# Patient Record
Sex: FEMALE | Race: OTHER | HISPANIC OR LATINO | Employment: UNEMPLOYED | URBAN - METROPOLITAN AREA
[De-identification: names, ages, dates, MRNs, and addresses within clinical notes are randomized per-mention and may not be internally consistent; named-entity substitution may affect disease eponyms.]

---

## 2022-11-30 LAB
ABO + RH BLD: NORMAL
HBV SURFACE AG SERPL QL IA: NEGATIVE
HCT VFR BLD AUTO: 37.7 % (ref 36–46)
HGB BLD-MCNC: 11.7 G/DL (ref 12–16)
HIV 1+2 AB+HIV1 P24 AG SERPL QL IA: NEGATIVE
INDIRECT COOMBS: NEGATIVE
MCV RBC AUTO: 4.2 FL (ref 82–108)
N GONORRHOEAE, AMPLIFIED DNA: NEGATIVE
PLATELET # BLD AUTO: 262 K/UL (ref 150–399)
RPR: NEGATIVE
RUBELLA IMMUNE STATUS: NORMAL

## 2022-12-06 ENCOUNTER — TELEPHONE (OUTPATIENT)
Dept: OBSTETRICS AND GYNECOLOGY | Facility: CLINIC | Age: 22
End: 2022-12-06
Payer: MEDICAID

## 2022-12-06 NOTE — TELEPHONE ENCOUNTER
----- Message from Jamshid Gifford NP sent at 12/6/2022  7:49 AM CST -----  20 weeks. Please get pt scheduled with midwife close to address. Thanks.    Jamshid

## 2022-12-13 ENCOUNTER — OFFICE VISIT (OUTPATIENT)
Dept: OBSTETRICS AND GYNECOLOGY | Facility: CLINIC | Age: 22
End: 2022-12-13
Payer: MEDICAID

## 2022-12-13 VITALS
HEIGHT: 68 IN | WEIGHT: 293 LBS | SYSTOLIC BLOOD PRESSURE: 130 MMHG | BODY MASS INDEX: 44.41 KG/M2 | DIASTOLIC BLOOD PRESSURE: 88 MMHG

## 2022-12-13 DIAGNOSIS — O09.30 LATE PRENATAL CARE: ICD-10-CM

## 2022-12-13 DIAGNOSIS — Z32.01 POSITIVE PREGNANCY TEST: Primary | ICD-10-CM

## 2022-12-13 DIAGNOSIS — J45.909 ASTHMA, UNSPECIFIED ASTHMA SEVERITY, UNSPECIFIED WHETHER COMPLICATED, UNSPECIFIED WHETHER PERSISTENT: ICD-10-CM

## 2022-12-13 DIAGNOSIS — Z34.90 PREGNANCY, UNSPECIFIED GESTATIONAL AGE: ICD-10-CM

## 2022-12-13 PROCEDURE — 90471 IMMUNIZATION ADMIN: CPT | Mod: PBBFAC

## 2022-12-13 PROCEDURE — 99999 PR PBB SHADOW E&M-EST. PATIENT-LVL III: ICD-10-PCS | Mod: PBBFAC,,, | Performed by: MIDWIFE

## 2022-12-13 PROCEDURE — 99213 OFFICE O/P EST LOW 20 MIN: CPT | Mod: PBBFAC,TH | Performed by: MIDWIFE

## 2022-12-13 PROCEDURE — 99999 PR PBB SHADOW E&M-EST. PATIENT-LVL III: CPT | Mod: PBBFAC,,, | Performed by: MIDWIFE

## 2022-12-13 PROCEDURE — 3079F PR MOST RECENT DIASTOLIC BLOOD PRESSURE 80-89 MM HG: ICD-10-PCS | Mod: CPTII,,, | Performed by: MIDWIFE

## 2022-12-13 PROCEDURE — 3075F PR MOST RECENT SYSTOLIC BLOOD PRESS GE 130-139MM HG: ICD-10-PCS | Mod: CPTII,,, | Performed by: MIDWIFE

## 2022-12-13 PROCEDURE — 3008F PR BODY MASS INDEX (BMI) DOCUMENTED: ICD-10-PCS | Mod: CPTII,,, | Performed by: MIDWIFE

## 2022-12-13 PROCEDURE — 1159F PR MEDICATION LIST DOCUMENTED IN MEDICAL RECORD: ICD-10-PCS | Mod: CPTII,,, | Performed by: MIDWIFE

## 2022-12-13 PROCEDURE — 3079F DIAST BP 80-89 MM HG: CPT | Mod: CPTII,,, | Performed by: MIDWIFE

## 2022-12-13 PROCEDURE — 1159F MED LIST DOCD IN RCRD: CPT | Mod: CPTII,,, | Performed by: MIDWIFE

## 2022-12-13 PROCEDURE — 3008F BODY MASS INDEX DOCD: CPT | Mod: CPTII,,, | Performed by: MIDWIFE

## 2022-12-13 PROCEDURE — 99203 OFFICE O/P NEW LOW 30 MIN: CPT | Mod: S$PBB,TH,, | Performed by: MIDWIFE

## 2022-12-13 PROCEDURE — 99203 PR OFFICE/OUTPT VISIT, NEW, LEVL III, 30-44 MIN: ICD-10-PCS | Mod: S$PBB,TH,, | Performed by: MIDWIFE

## 2022-12-13 PROCEDURE — 3075F SYST BP GE 130 - 139MM HG: CPT | Mod: CPTII,,, | Performed by: MIDWIFE

## 2022-12-13 RX ORDER — ALBUTEROL SULFATE 90 UG/1
AEROSOL, METERED RESPIRATORY (INHALATION)
COMMUNITY
Start: 2022-09-22

## 2022-12-13 RX ORDER — ASPIRIN 81 MG/1
81 TABLET ORAL
COMMUNITY
Start: 2022-11-30 | End: 2022-12-28 | Stop reason: SDUPTHER

## 2022-12-13 NOTE — PROGRESS NOTES
Two patient identifiers verified. Patient notified to wait in clinic 15 minutes after injection, patient verbalized understanding. Flu vaccine administered IM to patient's right deltoid. Patient tolerated well.

## 2022-12-13 NOTE — PROGRESS NOTES
" Ruth Ann Oropeza No obstetric history on file. complains of amenorrhea.  LMP 07/13/22. UPT is Positive.  She {IS / IS NOT:89113} taking a PNV.  She {Denies / Reports:02764} nausea/vomiting.    Past Medical History:   Diagnosis Date    Asthma      Past Surgical History:   Procedure Laterality Date    WISDOM TOOTH EXTRACTION       Family History   Problem Relation Age of Onset    Hypertension Father      Review of patient's allergies indicates:  No Known Allergies  Social History     Socioeconomic History    Marital status:    Tobacco Use    Smoking status: Former     Types: Cigarettes   Substance and Sexual Activity    Alcohol use: Never    Drug use: Never    Sexual activity: Yes     Partners: Male       ROS:  GENERAL: No fever, chills, fatigability or weight loss.  VULVAR: No pain, no lesions and no itching.  VAGINAL: No relaxation, no itching, no discharge, no abnormal bleeding and no lesions.  ABDOMEN: No abdominal pain. Denies nausea. Denies vomiting. No diarrhea. No constipation  BREAST: Denies pain. No lumps. No discharge.  URINARY: No incontinence, no nocturia, no frequency and no dysuria.  CARDIOVASCULAR: No chest pain. No shortness of breath. No leg cramps.  NEUROLOGICAL: no headaches. No vision changes.      Vitals:    12/13/22 1535   BP: 130/88     GENERAL: {GENERAL APPEARANCE:36289}  NECK: {thyroid:437167}  BREASTS: {exam; breast female:1202::"inspection negative, no nipple discharge or bleeding, no masses or nodularity palpable"}  ABDOMEN: {ABDOMEN EXAM:68892}  GENITALIA: {Exam; genitalia female:50558::"normal external genitalia, no erythema, no discharge"}  URETHRA: {urethra:692561}  VAGINA: {exam; vagina:81982}  CERVIX: {Exam; female cervix:53145}  UTERUS: {exam; uterus:83681}  ADNEXA: {exam; adnexa:11751}      Ruth Ann was seen today for possible pregnancy.    Diagnoses and all orders for this visit:    Positive pregnancy test  -     HIV 1/2 Ag/Ab (4th Gen); Future  -     RPR; Future  -     " Type & Screen; Future  -     Rubella antibody, IgG; Future  -     Urine culture  -     CBC auto differential; Future  -     US OB/GYN Procedure ([facility]); Future  -     C. trachomatis/N. gonorrhoeae by AMP DNA [facility]; Vagina  -     HEPATITIS PANEL, ACUTE; Future  -     Hemoglobin A1C; Future  -     Drug screen panel, in-house  -     COMPREHENSIVE METABOLIC PANEL; Future    BMI 50.0-59.9, adult    Late prenatal care        Counseled to avoid cat litter, not garden without gloves, avoid raw meat, heat up deli meat, to eat large fish like tuna no more than once a week, and to avoid soft unpasteurized cheeses.  I recommend a PNV daily.  She should avoid ibuprofen.

## 2022-12-14 NOTE — PROGRESS NOTES
"   21.6 weeks GA transferring care from [facility] has had dating and anatomy scan. WES 2023.  Denies complications this pregnancy. Current meds: PNV, Albuterol PRN, and baby ASA (questions answered about why she was prescribed)  Some records in electronic record on phone. Reviewed: HIV neg, Hep B neg, rubella imm, platelets 262, H&H 11.5/37.7. RPR NR, glucose 85, trich,gc,chlamydia panel neg. UDS neg. Need type and screen & hep C with 3rd trimester labs.  Difficulty ausculting FHT's with doppler.  by U/S. Baby boy "Randolph IV"  + fetal movement.   Discussed flu shot recommendations, given today  2nd trimester precautions  DONALDO sent  RTC in 2-3 weeks for f/u visit       "

## 2022-12-21 ENCOUNTER — TELEPHONE (OUTPATIENT)
Dept: OBSTETRICS AND GYNECOLOGY | Facility: CLINIC | Age: 22
End: 2022-12-21
Payer: MEDICAID

## 2022-12-21 NOTE — TELEPHONE ENCOUNTER
----- Message from Tonia Osman sent at 12/21/2022 12:17 PM CST -----  Contact: Ruth Ann  Type:  Sooner Apoointment Request    Caller is requesting a sooner appointment. Caller will not accept being placed on the waitlist and is requesting a message be sent to doctor.  Name of Caller:Ruth Ann  When is the first available appointment?unknown  Symptoms:23 weeks/ob visit  Would the patient rather a call back or a response via [facility]? call  Best Call Back Number:[phone]  Additional Information: Patient request to schedule an appointment sooner than next available.   Thank you,  GH

## 2022-12-28 ENCOUNTER — INITIAL PRENATAL (OUTPATIENT)
Dept: OBSTETRICS AND GYNECOLOGY | Facility: CLINIC | Age: 22
End: 2022-12-28
Payer: MEDICAID

## 2022-12-28 VITALS — DIASTOLIC BLOOD PRESSURE: 82 MMHG | BODY MASS INDEX: 52.76 KG/M2 | WEIGHT: 293 LBS | SYSTOLIC BLOOD PRESSURE: 122 MMHG

## 2022-12-28 DIAGNOSIS — O09.92 SUPERVISION OF HIGH RISK PREGNANCY IN SECOND TRIMESTER: ICD-10-CM

## 2022-12-28 DIAGNOSIS — O99.212 OBESITY AFFECTING PREGNANCY IN SECOND TRIMESTER: Primary | ICD-10-CM

## 2022-12-28 DIAGNOSIS — Z3A.24 24 WEEKS GESTATION OF PREGNANCY: ICD-10-CM

## 2022-12-28 PROCEDURE — 99213 OFFICE O/P EST LOW 20 MIN: CPT | Mod: TH,S$PBB,, | Performed by: ADVANCED PRACTICE MIDWIFE

## 2022-12-28 PROCEDURE — 99999 PR PBB SHADOW E&M-EST. PATIENT-LVL II: ICD-10-PCS | Mod: PBBFAC,,, | Performed by: ADVANCED PRACTICE MIDWIFE

## 2022-12-28 PROCEDURE — 99212 OFFICE O/P EST SF 10 MIN: CPT | Mod: PBBFAC,TH | Performed by: ADVANCED PRACTICE MIDWIFE

## 2022-12-28 PROCEDURE — 99213 PR OFFICE/OUTPT VISIT, EST, LEVL III, 20-29 MIN: ICD-10-PCS | Mod: TH,S$PBB,, | Performed by: ADVANCED PRACTICE MIDWIFE

## 2022-12-28 PROCEDURE — 99999 PR PBB SHADOW E&M-EST. PATIENT-LVL II: CPT | Mod: PBBFAC,,, | Performed by: ADVANCED PRACTICE MIDWIFE

## 2022-12-28 RX ORDER — ASCORBIC ACID, CHOLECALCIFEROL, DL-.ALPHA.-TOCOPHEROL ACETATE, THIAMINE HYDROCHLORIDE, RIBOFLAVIN, NIACINAMIDE, PYRIDOXINE HYDROCHLORIDE, FOLIC ACID, CYANOCOBALAMIN, CALCIUM CARBONATE, FERROUS FUMARATE, ZINC OXIDE, CUPRIC SULFATE 100; 400; 30; 1.6; 1.6; 20; 3.1; 1; 12; 200; 27; 10; 2 MG/1; [IU]/1; [IU]/1; MG/1; MG/1; MG/1; MG/1; MG/1; UG/1; MG/1; MG/1; MG/1; MG/1
1 TABLET, COATED ORAL DAILY
Qty: 90 TABLET | Refills: 3 | Status: SHIPPED | OUTPATIENT
Start: 2022-12-28 | End: 2023-05-16

## 2022-12-28 RX ORDER — ASPIRIN 81 MG/1
81 TABLET ORAL ONCE
Qty: 90 TABLET | Refills: 1 | Status: SHIPPED | OUTPATIENT
Start: 2022-12-28 | End: 2023-05-16 | Stop reason: ALTCHOICE

## 2022-12-28 RX ORDER — PANTOPRAZOLE SODIUM 20 MG/1
20 TABLET, DELAYED RELEASE ORAL DAILY
Qty: 30 TABLET | Refills: 3 | Status: SHIPPED | OUTPATIENT
Start: 2022-12-28 | End: 2023-01-18 | Stop reason: SDUPTHER

## 2022-12-28 RX ORDER — SERTRALINE HYDROCHLORIDE 50 MG/1
50 TABLET, FILM COATED ORAL DAILY
Qty: 30 TABLET | Refills: 6 | Status: SHIPPED | OUTPATIENT
Start: 2022-12-28 | End: 2023-01-18

## 2022-12-28 NOTE — PROGRESS NOTES
NOB transfer from [place] records phone and pink sticky  Refill for aspirin and vitamins,protonix for acid reflux  Weight goal 15 pounds set  A-Z book given  Zoloft 50 mg qd for anxiety . [facility]  Breast pump rx given

## 2023-01-11 ENCOUNTER — PATIENT MESSAGE (OUTPATIENT)
Dept: OTHER | Facility: OTHER | Age: 23
End: 2023-01-11
Payer: MEDICAID

## 2023-01-18 ENCOUNTER — ROUTINE PRENATAL (OUTPATIENT)
Dept: OBSTETRICS AND GYNECOLOGY | Facility: CLINIC | Age: 23
End: 2023-01-18
Payer: MEDICAID

## 2023-01-18 VITALS — SYSTOLIC BLOOD PRESSURE: 130 MMHG | DIASTOLIC BLOOD PRESSURE: 84 MMHG | BODY MASS INDEX: 53.06 KG/M2 | WEIGHT: 293 LBS

## 2023-01-18 DIAGNOSIS — O09.92 SUPERVISION OF HIGH RISK PREGNANCY IN SECOND TRIMESTER: ICD-10-CM

## 2023-01-18 DIAGNOSIS — Z34.92 NORMAL PREGNANCY IN SECOND TRIMESTER: Primary | ICD-10-CM

## 2023-01-18 DIAGNOSIS — Z3A.27 27 WEEKS GESTATION OF PREGNANCY: ICD-10-CM

## 2023-01-18 DIAGNOSIS — O99.212 OBESITY AFFECTING PREGNANCY IN SECOND TRIMESTER: ICD-10-CM

## 2023-01-18 PROCEDURE — 99999 PR PBB SHADOW E&M-EST. PATIENT-LVL II: ICD-10-PCS | Mod: PBBFAC,,, | Performed by: ADVANCED PRACTICE MIDWIFE

## 2023-01-18 PROCEDURE — 99213 PR OFFICE/OUTPT VISIT, EST, LEVL III, 20-29 MIN: ICD-10-PCS | Mod: TH,S$PBB,, | Performed by: ADVANCED PRACTICE MIDWIFE

## 2023-01-18 PROCEDURE — 99999 PR PBB SHADOW E&M-EST. PATIENT-LVL II: CPT | Mod: PBBFAC,,, | Performed by: ADVANCED PRACTICE MIDWIFE

## 2023-01-18 PROCEDURE — 99212 OFFICE O/P EST SF 10 MIN: CPT | Mod: PBBFAC,TH | Performed by: ADVANCED PRACTICE MIDWIFE

## 2023-01-18 PROCEDURE — 99213 OFFICE O/P EST LOW 20 MIN: CPT | Mod: TH,S$PBB,, | Performed by: ADVANCED PRACTICE MIDWIFE

## 2023-01-18 RX ORDER — SERTRALINE HYDROCHLORIDE 50 MG/1
100 TABLET, FILM COATED ORAL DAILY
Qty: 30 TABLET | Refills: 6 | Status: SHIPPED | OUTPATIENT
Start: 2023-01-18 | End: 2023-02-17 | Stop reason: SDUPTHER

## 2023-01-18 RX ORDER — PANTOPRAZOLE SODIUM 20 MG/1
20 TABLET, DELAYED RELEASE ORAL DAILY
Qty: 30 TABLET | Refills: 3 | Status: SHIPPED | OUTPATIENT
Start: 2023-01-18 | End: 2023-01-30

## 2023-01-18 NOTE — PROGRESS NOTES
22 y.o. female  at 27w0d   Reports + FM, denies VB, LOF or CTX  Doing well without concerns round ligament pain some pressure  TW lbs    28wk labs 1 week  Tdap info given  Patient  was provided with [facility] handouts: A Good Latch and Tips for a More Comfortable Labor. Discussed nonpharmacological pain relief methods for labor, techniques and benefits of effective breastfeeding position and latch, and basic breastfeeding management. Encouraged patient to attend [facility]s Prenatal Breastfeeding Class and to download the TUBE mobile clotilde if she has not already done so. Patient verbalizes understanding.      Reviewed warning signs, normal FKCs,  labor precautions and how/when to call.  RTC x 1 wks, call or present sooner prn.     I spent a total of 15 minutes on the day of the visit.This includes face to face time and non-face to face time preparing to see the patient (eg, review of tests), Obtaining and/or reviewing separately obtained history, Documenting clinical information in the electronic or other health record, Independently interpreting resultsand communicating results to the patient/family/caregiver, or Care coordination.

## 2023-01-25 ENCOUNTER — PATIENT MESSAGE (OUTPATIENT)
Dept: OTHER | Facility: OTHER | Age: 23
End: 2023-01-25
Payer: MEDICAID

## 2023-01-30 ENCOUNTER — ROUTINE PRENATAL (OUTPATIENT)
Dept: OBSTETRICS AND GYNECOLOGY | Facility: CLINIC | Age: 23
End: 2023-01-30
Payer: MEDICAID

## 2023-01-30 ENCOUNTER — LAB VISIT (OUTPATIENT)
Dept: LAB | Facility: HOSPITAL | Age: 23
End: 2023-01-30
Attending: ADVANCED PRACTICE MIDWIFE
Payer: MEDICAID

## 2023-01-30 VITALS — BODY MASS INDEX: 53.87 KG/M2 | SYSTOLIC BLOOD PRESSURE: 118 MMHG | WEIGHT: 293 LBS | DIASTOLIC BLOOD PRESSURE: 76 MMHG

## 2023-01-30 DIAGNOSIS — O09.92 SUPERVISION OF HIGH RISK PREGNANCY IN SECOND TRIMESTER: ICD-10-CM

## 2023-01-30 DIAGNOSIS — Z3A.28 28 WEEKS GESTATION OF PREGNANCY: ICD-10-CM

## 2023-01-30 DIAGNOSIS — O99.212 OBESITY AFFECTING PREGNANCY IN SECOND TRIMESTER: Primary | ICD-10-CM

## 2023-01-30 DIAGNOSIS — Z34.92 NORMAL PREGNANCY IN SECOND TRIMESTER: ICD-10-CM

## 2023-01-30 LAB
BASOPHILS # BLD AUTO: 0.04 K/UL (ref 0–0.2)
BASOPHILS NFR BLD: 0.3 % (ref 0–1.9)
DIFFERENTIAL METHOD: ABNORMAL
EOSINOPHIL # BLD AUTO: 0.1 K/UL (ref 0–0.5)
EOSINOPHIL NFR BLD: 0.8 % (ref 0–8)
ERYTHROCYTE [DISTWIDTH] IN BLOOD BY AUTOMATED COUNT: 13.9 % (ref 11.5–14.5)
GLUCOSE SERPL-MCNC: 86 MG/DL (ref 70–140)
HCT VFR BLD AUTO: 36.1 % (ref 37–48.5)
HGB BLD-MCNC: 11.4 G/DL (ref 12–16)
HIV 1+2 AB+HIV1 P24 AG SERPL QL IA: NORMAL
IMM GRANULOCYTES # BLD AUTO: 0.06 K/UL (ref 0–0.04)
IMM GRANULOCYTES NFR BLD AUTO: 0.5 % (ref 0–0.5)
LYMPHOCYTES # BLD AUTO: 2.4 K/UL (ref 1–4.8)
LYMPHOCYTES NFR BLD: 19 % (ref 18–48)
MCH RBC QN AUTO: 25.4 PG (ref 27–31)
MCHC RBC AUTO-ENTMCNC: 31.6 G/DL (ref 32–36)
MCV RBC AUTO: 80 FL (ref 82–98)
MONOCYTES # BLD AUTO: 0.5 K/UL (ref 0.3–1)
MONOCYTES NFR BLD: 3.9 % (ref 4–15)
NEUTROPHILS # BLD AUTO: 9.4 K/UL (ref 1.8–7.7)
NEUTROPHILS NFR BLD: 75.5 % (ref 38–73)
NRBC BLD-RTO: 0 /100 WBC
PLATELET # BLD AUTO: 278 K/UL (ref 150–450)
PMV BLD AUTO: 12.9 FL (ref 9.2–12.9)
RBC # BLD AUTO: 4.49 M/UL (ref 4–5.4)
WBC # BLD AUTO: 12.44 K/UL (ref 3.9–12.7)

## 2023-01-30 PROCEDURE — 36415 COLL VENOUS BLD VENIPUNCTURE: CPT | Performed by: ADVANCED PRACTICE MIDWIFE

## 2023-01-30 PROCEDURE — 99999 PR PBB SHADOW E&M-EST. PATIENT-LVL II: ICD-10-PCS | Mod: PBBFAC,,, | Performed by: ADVANCED PRACTICE MIDWIFE

## 2023-01-30 PROCEDURE — 86900 BLOOD TYPING SEROLOGIC ABO: CPT | Performed by: ADVANCED PRACTICE MIDWIFE

## 2023-01-30 PROCEDURE — 86592 SYPHILIS TEST NON-TREP QUAL: CPT | Performed by: ADVANCED PRACTICE MIDWIFE

## 2023-01-30 PROCEDURE — 85025 COMPLETE CBC W/AUTO DIFF WBC: CPT | Performed by: ADVANCED PRACTICE MIDWIFE

## 2023-01-30 PROCEDURE — 99213 PR OFFICE/OUTPT VISIT, EST, LEVL III, 20-29 MIN: ICD-10-PCS | Mod: TH,S$PBB,, | Performed by: ADVANCED PRACTICE MIDWIFE

## 2023-01-30 PROCEDURE — 82950 GLUCOSE TEST: CPT | Performed by: ADVANCED PRACTICE MIDWIFE

## 2023-01-30 PROCEDURE — 87389 HIV-1 AG W/HIV-1&-2 AB AG IA: CPT | Performed by: ADVANCED PRACTICE MIDWIFE

## 2023-01-30 PROCEDURE — 99999 PR PBB SHADOW E&M-EST. PATIENT-LVL II: CPT | Mod: PBBFAC,,, | Performed by: ADVANCED PRACTICE MIDWIFE

## 2023-01-30 PROCEDURE — 99213 OFFICE O/P EST LOW 20 MIN: CPT | Mod: TH,S$PBB,, | Performed by: ADVANCED PRACTICE MIDWIFE

## 2023-01-30 PROCEDURE — 99212 OFFICE O/P EST SF 10 MIN: CPT | Mod: PBBFAC,TH | Performed by: ADVANCED PRACTICE MIDWIFE

## 2023-01-30 RX ORDER — PANTOPRAZOLE SODIUM 40 MG/1
40 TABLET, DELAYED RELEASE ORAL DAILY
Qty: 30 TABLET | Refills: 1 | Status: SHIPPED | OUTPATIENT
Start: 2023-01-30 | End: 2024-01-30

## 2023-01-30 NOTE — PROGRESS NOTES
22 y.o. female  at 28w5d   Reports + FM, denies VB, LOF or CTX  Doing well without concerns   TW lbs    28wk labs today     Tdap next visit  Patient was provided with [facility] handouts: A Good Latch and Tips for a More Comfortable Labor. Discussed nonpharmacological pain relief methods for labor, techniques and benefits of effective breastfeeding position and latch, and basic breastfeeding management. Encouraged patient to attend [facility]s Prenatal Breastfeeding Class and to download the [employer] mobile clotilde if she has not already done so. Patient verbalizes understanding.      Reviewed warning signs, normal FKCs,  labor precautions and how/when to call.  RTC x 2 wks, call or present sooner prn.     I spent a total of 15 minutes on the day of the visit.This includes face to face time and non-face to face time preparing to see the patient (eg, review of tests), Obtaining and/or reviewing separately obtained history, Documenting clinical information in the electronic or other health record, Independently interpreting resultsand communicating results to the patient/family/caregiver, or Care coordination.

## 2023-01-30 NOTE — PATIENT INSTRUCTIONS
"     Frequently Asked Questions for Pregnant Women Concerning Tdap Vaccination    What is pertussis (whooping cough)?  Pertussis (also called whooping cough) is a highly contagious disease that causes severe coughing. People with pertussis may make a "whooping" sound when they try to breathe and gasp for air. In newborns (birth to 1 month), pertussis can be life threatening. Recent outbreaks have shown that infants younger than 3 months are at very high risk of severe infection.     What is Tdap?  The tetanus toxoid, reduced diphtheria toxoid, and acellular pertussis (Tdap) vaccine is used to prevent three infections: 1) tetanus, 2) diptheria, and 3) pertussis.    I am pregnant. Should I get a Tdap shot?  Yes. All pregnant women should get a Tdap shot in the 3rd trimester, preferably between 27 weeks and 36 weeks of pregnancy. The Tdap shot is an effective and safe way to protect you and your baby from serious illness and complications of pertussis. You should get a Tdap shot during each pregnancy.    Is it safe to get the Tdap shot during pregnancy?  Yes. There are no theoretical or proven concerns about the safety of the Tdap vaccine (or other inactivated vaccines like Tdap) during pregnancy. The shot is safe when given to pregnant women.     During which trimester is it safe to get a Tdap shot?  It is safe to get the Tdap shot during all three trimesters of pregnancy. Experts recommend that you get Tdap during the 3rd trimester (preferably between 27 weeks and 36 weeks of pregnancy). This gives your  the most protection. The shot causes you to make antibodies against pertussis. These antibiotics are passed to the fetus. They protect your  until he or she begins to get vaccines against pertussis at 2 months of age.    Can newborns be vaccinated against pertussis?  No. Newborns cannot start their vaccine series against pertussis until they are 2 months of age because the vaccine does not work in the " "first few weeks of life. This is partly why newborns are at a higher risk of getting pertussis and becoming very ill.    What else can I do to protect my baby against pertussis?  Getting your Tdap shot is the most important step in protecting yourself and your baby against pertussis. It also is important that all family members and caregivers are up-to-date with their vaccines. If they need the Tdap shot, they should get it at least 2 weeks before having contact with your . This makes a safety "cocoon" of vaccinated caregivers around your baby.    I am breastfeeding my baby. Is it safe to get the Tdap vaccine?  Yes. The Tdap shot can safely be given to breastfeeding women if they did not get the Tdap shot during pregnancy and have never received the Tdap shot before.    I did not get my Tdap shot during pregnancy. Do I still need to get the vaccine?  If you have never gotten the Tdap vaccine and you do not get the shot during pregnancy, be sure to get the vaccine right after you give birth, before you leave the hospital or birthing center. It will take about 2 weeks for your body to make protective antibodies in response to the vaccine. Once these antibodies are made, you are likely to give pertussis to your . But remember, your baby still will be at risk of catching whooping cough from others.    I got a Tdap shot during a past pregnancy. Do I need to get the shot again during this pregnancy?  Yes. All pregnant women should get a Tdap shot during each pregnancy, preferably between 27 weeks and 36 weeks of pregnancy. This time frame is recommended because it gives the most protection to the pregnant woman and the fetus. It appears to maximize the antibodies present in the  at birth.    I received a Tdap shot early in this pregnancy, before 27-36 weeks of pregnancy. Do I need to get another Tdap shot between 27 weeks and 36 weeks of pregnancy?  A pregnant woman does not need to get the Tdap shot " later in the same pregnancy if she got the shot in the first or second trimester.     Can I get the Tdap vaccine and flu vaccine at the same time?  Yes. You can get more than one vaccine in the same visit.    What is the difference between Tdap, Td, and DTaP?  Children receive the diphtheria and tetanus toxoids and acellular pertussis (DTaP) vaccine. Teenagers and adults are given the Tdap vaccine as a booster to the DTaP they got as children. Adults receive the tetanus and diphtheria (Td) vaccine every 10 years to protect against tetanus and diphtheria. Td does not protect against pertussis.     RESOURCES  www.[employer].org  www.immunizationforwomen.org  https://www.cdc.gov/vaccines/pregnancy/index.html  www.[facility].org

## 2023-01-31 LAB
ABO + RH BLD: NORMAL
BLD GP AB SCN CELLS X3 SERPL QL: NORMAL
RPR SER QL: NORMAL

## 2023-02-01 ENCOUNTER — PATIENT MESSAGE (OUTPATIENT)
Dept: OBSTETRICS AND GYNECOLOGY | Facility: CLINIC | Age: 23
End: 2023-02-01
Payer: MEDICAID

## 2023-02-08 ENCOUNTER — PATIENT MESSAGE (OUTPATIENT)
Dept: OTHER | Facility: OTHER | Age: 23
End: 2023-02-08
Payer: MEDICAID

## 2023-02-09 ENCOUNTER — PATIENT MESSAGE (OUTPATIENT)
Dept: OBSTETRICS AND GYNECOLOGY | Facility: CLINIC | Age: 23
End: 2023-02-09
Payer: MEDICAID

## 2023-02-09 ENCOUNTER — HOSPITAL ENCOUNTER (OUTPATIENT)
Facility: HOSPITAL | Age: 23
Discharge: HOME | End: 2023-02-09
Attending: OBSTETRICS & GYNECOLOGY | Admitting: OBSTETRICS & GYNECOLOGY
Payer: MEDICAID

## 2023-02-09 VITALS
SYSTOLIC BLOOD PRESSURE: 131 MMHG | DIASTOLIC BLOOD PRESSURE: 82 MMHG | HEART RATE: 91 BPM | OXYGEN SATURATION: 99 % | RESPIRATION RATE: 18 BRPM

## 2023-02-09 DIAGNOSIS — N93.9 VAGINAL SPOTTING: ICD-10-CM

## 2023-02-09 LAB
BACTERIA #/AREA URNS HPF: ABNORMAL /HPF
BILIRUB UR QL STRIP: NEGATIVE
CLARITY UR: ABNORMAL
COLOR UR: YELLOW
CREAT UR-MCNC: 214.8 MG/DL (ref 15–325)
GLUCOSE UR QL STRIP: NEGATIVE
HGB UR QL STRIP: NEGATIVE
KETONES UR QL STRIP: NEGATIVE
LEUKOCYTE ESTERASE UR QL STRIP: ABNORMAL
MICROSCOPIC COMMENT: ABNORMAL
NITRITE UR QL STRIP: NEGATIVE
PH UR STRIP: 6 [PH] (ref 5–8)
PROT UR QL STRIP: ABNORMAL
PROT UR-MCNC: 16 MG/DL (ref 0–15)
PROT/CREAT UR: 0.07 MG/G{CREAT} (ref 0–0.2)
RBC #/AREA URNS HPF: 2 /HPF (ref 0–4)
SP GR UR STRIP: 1.03 (ref 1–1.03)
SQUAMOUS #/AREA URNS HPF: 15 /HPF
URN SPEC COLLECT METH UR: ABNORMAL
UROBILINOGEN UR STRIP-ACNC: NEGATIVE EU/DL
WBC #/AREA URNS HPF: 8 /HPF (ref 0–5)
WBC CLUMPS URNS QL MICRO: ABNORMAL

## 2023-02-09 PROCEDURE — 99213 PR OFFICE/OUTPT VISIT, EST, LEVL III, 20-29 MIN: ICD-10-PCS | Mod: 25,TH,, | Performed by: MIDWIFE

## 2023-02-09 PROCEDURE — 25000003 PHARM REV CODE 250: Performed by: MIDWIFE

## 2023-02-09 PROCEDURE — 87591 N.GONORRHOEAE DNA AMP PROB: CPT | Performed by: MIDWIFE

## 2023-02-09 PROCEDURE — 84156 ASSAY OF PROTEIN URINE: CPT | Performed by: MIDWIFE

## 2023-02-09 PROCEDURE — 59025 OBTAIN FETAL NONSTRESS TEST (NST): ICD-10-PCS | Mod: 26,,, | Performed by: MIDWIFE

## 2023-02-09 PROCEDURE — 81000 URINALYSIS NONAUTO W/SCOPE: CPT | Performed by: MIDWIFE

## 2023-02-09 PROCEDURE — 59025 FETAL NON-STRESS TEST: CPT | Mod: 26,,, | Performed by: MIDWIFE

## 2023-02-09 PROCEDURE — 59025 FETAL NON-STRESS TEST: CPT

## 2023-02-09 PROCEDURE — 99211 OFF/OP EST MAY X REQ PHY/QHP: CPT | Mod: 25

## 2023-02-09 PROCEDURE — 99213 OFFICE O/P EST LOW 20 MIN: CPT | Mod: 25,TH,, | Performed by: MIDWIFE

## 2023-02-09 RX ORDER — ONDANSETRON 8 MG/1
8 TABLET, ORALLY DISINTEGRATING ORAL EVERY 8 HOURS PRN
Status: DISCONTINUED | OUTPATIENT
Start: 2023-02-09 | End: 2023-02-09 | Stop reason: HOSPADM

## 2023-02-09 RX ORDER — SODIUM CHLORIDE, SODIUM LACTATE, POTASSIUM CHLORIDE, CALCIUM CHLORIDE 600; 310; 30; 20 MG/100ML; MG/100ML; MG/100ML; MG/100ML
INJECTION, SOLUTION INTRAVENOUS CONTINUOUS
Status: DISCONTINUED | OUTPATIENT
Start: 2023-02-09 | End: 2023-02-09 | Stop reason: HOSPADM

## 2023-02-09 RX ORDER — METRONIDAZOLE 500 MG/1
500 TABLET ORAL EVERY 12 HOURS
Qty: 14 TABLET | Refills: 0 | Status: SHIPPED | OUTPATIENT
Start: 2023-02-09 | End: 2023-02-16

## 2023-02-09 RX ORDER — CYCLOBENZAPRINE HCL 10 MG
10 TABLET ORAL ONCE
Status: COMPLETED | OUTPATIENT
Start: 2023-02-09 | End: 2023-02-09

## 2023-02-09 RX ORDER — ACETAMINOPHEN 500 MG
500 TABLET ORAL EVERY 6 HOURS PRN
Status: DISCONTINUED | OUTPATIENT
Start: 2023-02-09 | End: 2023-02-09 | Stop reason: HOSPADM

## 2023-02-09 RX ORDER — PROCHLORPERAZINE EDISYLATE 5 MG/ML
5 INJECTION INTRAMUSCULAR; INTRAVENOUS EVERY 6 HOURS PRN
Status: DISCONTINUED | OUTPATIENT
Start: 2023-02-09 | End: 2023-02-09 | Stop reason: HOSPADM

## 2023-02-09 RX ADMIN — CYCLOBENZAPRINE HYDROCHLORIDE 10 MG: 10 TABLET, FILM COATED ORAL at 11:02

## 2023-02-09 NOTE — DISCHARGE SUMMARY
[facility] - Labor & Delivery  Obstetrics  Discharge Summary      Patient Name: Ruth Ann Oropeza  MRN: [id number]  Admission Date: 2023  Hospital Length of Stay: 0 days  Discharge Date and Time:  2023 12:03 PM  Attending Physician: Anna Marie Auguste MD   Discharging Provider: Charlene Angel CNM   Primary Care Provider: Primary Doctor No    HPI: 22 y.o.  WES 2023 EGA 30w1d c/o spotting yesterday and abdominal cramping       FHT: 140 Cat 1 (reassuring)  TOCO:  none  * No surgery found *     Hospital Course:   Continuous monitoring   Serial blood pressures  Speculum exam performed. No bleeding noted in vaginal canal. Cervix appears long this and closed. Thin white discharge noted in canal. Wet prep + for clue cells. UA with 1+ leukocytes, occasional bacteria, few WBC clumps, and 8 WBC. Will treat for BV  Flexeril given for back pain  Stressed oral hydration   BP slightly elevated at first, but now in normal range. Will continue to monitor outpatient. NST reactive. PCR pending. Has flagyl rx brought to bedside. Discharge instructions discussed and when to come to LD. Keep next apt.            Final Active Diagnoses:    Diagnosis Date Noted POA    PRINCIPAL PROBLEM:  Vaginal spotting [N93.9] 2023 Yes      Problems Resolved During this Admission:        Significant Diagnostic Studies: Labs: All labs within the past 24 hours have been reviewed      Feeding Method: breast    Immunizations     None          This patient has no babies on file.  Pending Diagnostic Studies:     None          Discharged Condition: good    Disposition: Home or Self Care    Follow Up:    Patient Instructions:      Diet Adult Regular     Pelvic Rest     Notify your health care provider if you experience any of the following:  temperature >100.4     Notify your health care provider if you experience any of the following:  persistent nausea and vomiting or diarrhea     Notify your health care provider if you experience any of  the following:  severe uncontrolled pain     Notify your health care provider if you experience any of the following:  difficulty breathing or increased cough     Notify your health care provider if you experience any of the following:  severe persistent headache     Notify your health care provider if you experience any of the following:  persistent dizziness, light-headedness, or visual disturbances     Notify your health care provider if you experience any of the following:  increased confusion or weakness     Notify your health care provider if you experience any of the following:   Order Comments: Decreased fetal movement, leaking of fluid, and/or vaginal bleeding     Medications:  Current Discharge Medication List      START taking these medications    Details   metroNIDAZOLE (FLAGYL) 500 MG tablet Take 1 tablet (500 mg total) by mouth every 12 (twelve) hours. for 7 days  Qty: 14 tablet, Refills: 0    Comments: Please deliver to bedside LD triage A         CONTINUE these medications which have NOT CHANGED    Details   albuterol (PROVENTIL/VENTOLIN HFA) 90 mcg/actuation inhaler Inhale into the lungs.      aspirin (ECOTRIN) 81 MG EC tablet Take 1 tablet (81 mg total) by mouth once. for 1 dose  Qty: 90 tablet, Refills: 1    Associated Diagnoses: Obesity affecting pregnancy in second trimester; 24 weeks gestation of pregnancy      pantoprazole (PROTONIX) 40 MG tablet Take 1 tablet (40 mg total) by mouth once daily.  Qty: 30 tablet, Refills: 1      prenatal vit103-iron fum-folic () 27 mg iron- 1 mg Tab Take 1 tablet by mouth once daily.  Qty: 90 tablet, Refills: 3    Comments: Any generic on plan may be used      sertraline (ZOLOFT) 50 MG tablet Take 2 tablets (100 mg total) by mouth once daily.  Qty: 30 tablet, Refills: 6             Charlene Angel CNM  Obstetrics  [facility] - Labor & Delivery

## 2023-02-09 NOTE — PROCEDURES
Ruth Ann Oropeza is a 22 y.o. female patient.    Pulse: 91 (02/09/23 1050)  Resp: 18 (02/09/23 0900)  BP: 131/82 (02/09/23 1110)  SpO2: 99 % (02/09/23 1050)       Obtain Fetal nonstress test (NST)    Date/Time: 2/9/2023 12:04 PM  Performed by: Charlene Angel CNM  Authorized by: Charlene Angel CNM     Nonstress Test:     Variability:  6-25 BPM    Decelerations:  None    Accelerations:  15 bpm    Acoustic Stimulator: No      Baseline:  140    Uterine Irritability: No      Contractions:  Not present  Biophysical Profile:     Nonstress Test Interpretation: reactive      Overall Impression:  Reassuring  Post-procedure:     Patient tolerance:  Patient tolerated the procedure well with no immediate complications    2/9/2023

## 2023-02-09 NOTE — NURSING
Patient reports falling into wall a couple days ago causing forearm bruising. Patient denies falling on stomach.

## 2023-02-09 NOTE — SUBJECTIVE & OBJECTIVE
Obstetric HPI:  Patient reports abdominal cramping contractions, active fetal movement, Yes vaginal bleeding , No loss of fluid     This pregnancy has been complicated by:  Obesity, asthma     OB History    Para Term  AB Living   1 0 0 0 0 0   SAB IAB Ectopic Multiple Live Births   0 0 0 0 0      # Outcome Date GA Lbr David/2nd Weight Sex Delivery Anes PTL Lv   1 Current              Past Medical History:   Diagnosis Date    Asthma      Past Surgical History:   Procedure Laterality Date    WISDOM TOOTH EXTRACTION         PTA Medications   Medication Sig    albuterol (PROVENTIL/VENTOLIN HFA) 90 mcg/actuation inhaler Inhale into the lungs.    aspirin (ECOTRIN) 81 MG EC tablet Take 1 tablet (81 mg total) by mouth once. for 1 dose    pantoprazole (PROTONIX) 40 MG tablet Take 1 tablet (40 mg total) by mouth once daily.    prenatal vit103-iron fum-folic () 27 mg iron- 1 mg Tab Take 1 tablet by mouth once daily.    sertraline (ZOLOFT) 50 MG tablet Take 2 tablets (100 mg total) by mouth once daily.       Review of patient's allergies indicates:  No Known Allergies     Family History       Problem Relation (Age of Onset)    Hypertension Father          Tobacco Use    Smoking status: Former     Types: Cigarettes    Smokeless tobacco: Not on file   Substance and Sexual Activity    Alcohol use: Never    Drug use: Never    Sexual activity: Yes     Partners: Male     Review of Systems   Gastrointestinal:  Positive for abdominal pain.   Genitourinary:  Positive for vaginal bleeding.   Musculoskeletal:  Positive for back pain.    Objective:     Vital Signs (Most Recent):  Pulse: 108 (23 0900)  Resp: 18 (23 0900)  BP: (!) 129/91 (23 0900)  SpO2: 96 % (23 0900)   Vital Signs (24h Range):  Pulse:  [108] 108  Resp:  [18] 18  SpO2:  [96 %] 96 %  BP: (129)/(91) 129/91        There is no height or weight on file to calculate BMI.    FHT: 145 Cat 1 (reassuring)  TOCO:  none    Physical Exam:    Constitutional: She is oriented to person, place, and time. She appears well-developed and well-nourished.    HENT:   Head: Normocephalic.    Eyes: Conjunctivae are normal.      Pulmonary/Chest: Effort normal.        Abdominal: Soft.     Genitourinary:    Uterus normal.   There is vaginal discharge in the vagina.    Genitourinary Comments: Thin white discharge noted in vaginal canal. No bleeding.              Musculoskeletal: Normal range of motion.       Neurological: She is alert and oriented to person, place, and time.    Skin: Skin is warm and dry. Bruising noted.   Bruising noted on bilateral forearms, pt reports falling a few days ago    Psychiatric: She has a normal mood and affect. Her behavior is normal. Judgment and thought content normal.     Cervix: appears thick and closed per speculum exam      Significant Labs:  Lab Results   Component Value Date    [facility]30/[facility]       I have personallly reviewed all pertinent lab results from the last 24 hours.

## 2023-02-09 NOTE — ASSESSMENT & PLAN NOTE
Continuous monitoring   Serial blood pressures  Speculum exam performed. No bleeding noted in vaginal canal. Cervix appears long this and closed. Thin white discharge noted in canal. Wet prep + for clue cells. UA with 1+ leukocytes, occasional bacteria, few WBC clumps, and 8 WBC. Will treat for BV  Flexeril given for back pain  Stressed oral hydration

## 2023-02-09 NOTE — DISCHARGE INSTRUCTIONS

## 2023-02-09 NOTE — HOSPITAL COURSE
Continuous monitoring   Serial blood pressures  Speculum exam performed. No bleeding noted in vaginal canal. Cervix appears long this and closed. Thin white discharge noted in canal. Wet prep + for clue cells. UA with 1+ leukocytes, occasional bacteria, few WBC clumps, and 8 WBC. Will treat for BV  Flexeril given for back pain  Stressed oral hydration   BP slightly elevated at first, but now in normal range. Will continue to monitor outpatient. NST reactive. PCR pending. Has flagyl rx brought to bedside. Discharge instructions discussed and when to come to LD. Keep next apt.

## 2023-02-09 NOTE — H&P
[facility]  Obstetrics  History & Physical    Patient Name: Ruth Ann Oropeza  MRN: [id number]  Admission Date: 2023  Primary Care Provider: Primary Doctor No    Subjective:     Principal Problem:Vaginal spotting    History of Present Illness:  22 y.o.  WES 2023 EGA 30w1d c/o spotting yesterday and abdominal cramping       Obstetric HPI:  Patient reports abdominal cramping contractions, active fetal movement, Yes vaginal bleeding , No loss of fluid     This pregnancy has been complicated by:  Obesity, asthma     OB History    Para Term  AB Living   1 0 0 0 0 0   SAB IAB Ectopic Multiple Live Births   0 0 0 0 0      # Outcome Date GA Lbr David/2nd Weight Sex Delivery Anes PTL Lv   1 Current              Past Medical History:   Diagnosis Date    Asthma      Past Surgical History:   Procedure Laterality Date    WISDOM TOOTH EXTRACTION         PTA Medications   Medication Sig    albuterol (PROVENTIL/VENTOLIN HFA) 90 mcg/actuation inhaler Inhale into the lungs.    aspirin (ECOTRIN) 81 MG EC tablet Take 1 tablet (81 mg total) by mouth once. for 1 dose    pantoprazole (PROTONIX) 40 MG tablet Take 1 tablet (40 mg total) by mouth once daily.    prenatal vit103-iron fum-folic () 27 mg iron- 1 mg Tab Take 1 tablet by mouth once daily.    sertraline (ZOLOFT) 50 MG tablet Take 2 tablets (100 mg total) by mouth once daily.       Review of patient's allergies indicates:  No Known Allergies     Family History       Problem Relation (Age of Onset)    Hypertension Father          Tobacco Use    Smoking status: Former     Types: Cigarettes    Smokeless tobacco: Not on file   Substance and Sexual Activity    Alcohol use: Never    Drug use: Never    Sexual activity: Yes     Partners: Male     Review of Systems   Gastrointestinal:  Positive for abdominal pain.   Genitourinary:  Positive for vaginal bleeding.   Musculoskeletal:  Positive for back pain.    Objective:     Vital  Signs (Most Recent):  Pulse: 108 (23 0900)  Resp: 18 (23 0900)  BP: (!) 129/91 (23 0900)  SpO2: 96 % (23)   Vital Signs (24h Range):  Pulse:  [108] 108  Resp:  [18] 18  SpO2:  [96 %] 96 %  BP: (129)/(91) 129/91        There is no height or weight on file to calculate BMI.    FHT: 145 Cat 1 (reassuring)  TOCO:  none    Physical Exam:   Constitutional: She is oriented to person, place, and time. She appears well-developed and well-nourished.    HENT:   Head: Normocephalic.    Eyes: Conjunctivae are normal.      Pulmonary/Chest: Effort normal.        Abdominal: Soft.     Genitourinary:    Uterus normal.   There is vaginal discharge in the vagina.    Genitourinary Comments: Thin white discharge noted in vaginal canal. No bleeding.              Musculoskeletal: Normal range of motion.       Neurological: She is alert and oriented to person, place, and time.    Skin: Skin is warm and dry. Bruising noted.   Bruising noted on bilateral forearms, pt reports falling a few days ago    Psychiatric: She has a normal mood and affect. Her behavior is normal. Judgment and thought content normal.     Cervix: appears thick and closed per speculum exam      Significant Labs:  Lab Results   Component Value Date    GROUPTRH O POS 2023       I have personallly reviewed all pertinent lab results from the last 24 hours.    Assessment/Plan:     22 y.o. female  at 30w1d for:    * Vaginal spotting  Continuous monitoring   Serial blood pressures  Speculum exam performed. No bleeding noted in vaginal canal. Cervix appears long this and closed. Thin white discharge noted in canal. Wet prep + for clue cells. UA with 1+ leukocytes, occasional bacteria, few WBC clumps, and 8 WBC. Will treat for BV  Flexeril given for back pain  Stressed oral hydration     Asthma           Charlene Angel CNM  Obstetrics  [facility]

## 2023-02-10 LAB
C TRACH DNA SPEC QL NAA+PROBE: DETECTED
N GONORRHOEA DNA SPEC QL NAA+PROBE: NOT DETECTED

## 2023-02-11 ENCOUNTER — PATIENT MESSAGE (OUTPATIENT)
Dept: OBSTETRICS AND GYNECOLOGY | Facility: CLINIC | Age: 23
End: 2023-02-11
Payer: MEDICAID

## 2023-02-13 ENCOUNTER — PATIENT MESSAGE (OUTPATIENT)
Dept: OBSTETRICS AND GYNECOLOGY | Facility: CLINIC | Age: 23
End: 2023-02-13
Payer: MEDICAID

## 2023-02-13 DIAGNOSIS — O98.813 CHLAMYDIA INFECTION AFFECTING PREGNANCY IN THIRD TRIMESTER: ICD-10-CM

## 2023-02-13 DIAGNOSIS — A74.9 CHLAMYDIA INFECTION AFFECTING PREGNANCY IN THIRD TRIMESTER: ICD-10-CM

## 2023-02-13 RX ORDER — AZITHROMYCIN 500 MG/1
1000 TABLET, FILM COATED ORAL ONCE
Qty: 2 TABLET | Refills: 0 | Status: SHIPPED | OUTPATIENT
Start: 2023-02-13 | End: 2023-02-13

## 2023-02-15 ENCOUNTER — PATIENT MESSAGE (OUTPATIENT)
Dept: OBSTETRICS AND GYNECOLOGY | Facility: CLINIC | Age: 23
End: 2023-02-15

## 2023-02-15 ENCOUNTER — ROUTINE PRENATAL (OUTPATIENT)
Dept: OBSTETRICS AND GYNECOLOGY | Facility: CLINIC | Age: 23
End: 2023-02-15
Payer: MEDICAID

## 2023-02-15 VITALS — DIASTOLIC BLOOD PRESSURE: 84 MMHG | BODY MASS INDEX: 54.64 KG/M2 | WEIGHT: 293 LBS | SYSTOLIC BLOOD PRESSURE: 128 MMHG

## 2023-02-15 DIAGNOSIS — Z36.89 ENCOUNTER FOR ULTRASOUND TO ASSESS FETAL GROWTH: Primary | ICD-10-CM

## 2023-02-15 DIAGNOSIS — O99.212 OBESITY AFFECTING PREGNANCY IN SECOND TRIMESTER: ICD-10-CM

## 2023-02-15 DIAGNOSIS — Z3A.31 31 WEEKS GESTATION OF PREGNANCY: ICD-10-CM

## 2023-02-15 DIAGNOSIS — O09.92 SUPERVISION OF HIGH RISK PREGNANCY IN SECOND TRIMESTER: ICD-10-CM

## 2023-02-15 DIAGNOSIS — O36.8390 ULTRASOUND SCAN DONE FOR INABILITY TO HEAR FETAL HEART TONES: ICD-10-CM

## 2023-02-15 PROCEDURE — 99213 OFFICE O/P EST LOW 20 MIN: CPT | Mod: TH,S$PBB,, | Performed by: ADVANCED PRACTICE MIDWIFE

## 2023-02-15 PROCEDURE — 90715 TDAP VACCINE 7 YRS/> IM: CPT | Mod: PBBFAC

## 2023-02-15 PROCEDURE — 76819 US OB/GYN PROCEDURE (VIEWPOINT): ICD-10-PCS | Mod: 26,S$PBB,, | Performed by: OBSTETRICS & GYNECOLOGY

## 2023-02-15 PROCEDURE — 99213 PR OFFICE/OUTPT VISIT, EST, LEVL III, 20-29 MIN: ICD-10-PCS | Mod: TH,S$PBB,, | Performed by: ADVANCED PRACTICE MIDWIFE

## 2023-02-15 PROCEDURE — 99999 PR PBB SHADOW E&M-EST. PATIENT-LVL II: CPT | Mod: PBBFAC,,, | Performed by: ADVANCED PRACTICE MIDWIFE

## 2023-02-15 PROCEDURE — 76816 OB US FOLLOW-UP PER FETUS: CPT | Mod: 26,S$PBB,, | Performed by: OBSTETRICS & GYNECOLOGY

## 2023-02-15 PROCEDURE — 99212 OFFICE O/P EST SF 10 MIN: CPT | Mod: PBBFAC,TH,25 | Performed by: ADVANCED PRACTICE MIDWIFE

## 2023-02-15 PROCEDURE — 76816 US OB/GYN PROCEDURE (VIEWPOINT): ICD-10-PCS | Mod: 26,S$PBB,, | Performed by: OBSTETRICS & GYNECOLOGY

## 2023-02-15 PROCEDURE — 99999 PR PBB SHADOW E&M-EST. PATIENT-LVL II: ICD-10-PCS | Mod: PBBFAC,,, | Performed by: ADVANCED PRACTICE MIDWIFE

## 2023-02-15 PROCEDURE — 90471 IMMUNIZATION ADMIN: CPT | Mod: PBBFAC

## 2023-02-15 PROCEDURE — 76819 FETAL BIOPHYS PROFIL W/O NST: CPT | Mod: PBBFAC | Performed by: OBSTETRICS & GYNECOLOGY

## 2023-02-15 NOTE — PROGRESS NOTES
Verified patient with 2 patient identifiers. Allergies and medications reviewed.   Tdap vaccine given IM to left deltoid using aseptic technique.   No discomfort noted. Patient tolerated well.   Patient advised to wait 15 minutes in lobby to monitor for reaction.   Patient verbalized understanding.

## 2023-02-15 NOTE — PROGRESS NOTES
22 y.o. female  at 31w0d   Reports + FM, denies VB, LOF or CTX  Doing well without concerns   sono done MVP 6.8 breech,4# 43%TW lbs BPP 8/8  Reviewed 28wk lab results (O POS)   Tdap   today   Anemia mild  Reviewed warning signs, normal FKCs,  labor precautions and how/when to call.  RTC x 1 wks, call or present sooner prn.     I spent a total of 15 minutes on the day of the visit.This includes face to face time and non-face to face time preparing to see the patient (eg, review of tests), Obtaining and/or reviewing separately obtained history, Documenting clinical information in the electronic or other health record, Independently interpreting resultsand communicating results to the patient/family/caregiver, or Care coordination.

## 2023-02-20 ENCOUNTER — PATIENT MESSAGE (OUTPATIENT)
Dept: OBSTETRICS AND GYNECOLOGY | Facility: CLINIC | Age: 23
End: 2023-02-20
Payer: MEDICAID

## 2023-02-22 ENCOUNTER — PATIENT MESSAGE (OUTPATIENT)
Dept: OTHER | Facility: OTHER | Age: 23
End: 2023-02-22
Payer: MEDICAID

## 2023-02-22 ENCOUNTER — PATIENT MESSAGE (OUTPATIENT)
Dept: OBSTETRICS AND GYNECOLOGY | Facility: CLINIC | Age: 23
End: 2023-02-22
Payer: MEDICAID

## 2023-02-24 ENCOUNTER — PATIENT MESSAGE (OUTPATIENT)
Dept: OBSTETRICS AND GYNECOLOGY | Facility: CLINIC | Age: 23
End: 2023-02-24
Payer: MEDICAID

## 2023-02-27 ENCOUNTER — PROCEDURE VISIT (OUTPATIENT)
Dept: OBSTETRICS AND GYNECOLOGY | Facility: CLINIC | Age: 23
End: 2023-02-27
Payer: MEDICAID

## 2023-02-27 ENCOUNTER — ROUTINE PRENATAL (OUTPATIENT)
Dept: OBSTETRICS AND GYNECOLOGY | Facility: CLINIC | Age: 23
End: 2023-02-27
Payer: MEDICAID

## 2023-02-27 ENCOUNTER — TELEPHONE (OUTPATIENT)
Dept: OBSTETRICS AND GYNECOLOGY | Facility: CLINIC | Age: 23
End: 2023-02-27
Payer: MEDICAID

## 2023-02-27 VITALS — DIASTOLIC BLOOD PRESSURE: 81 MMHG | WEIGHT: 293 LBS | SYSTOLIC BLOOD PRESSURE: 119 MMHG | BODY MASS INDEX: 55.21 KG/M2

## 2023-02-27 DIAGNOSIS — Z3A.32 32 WEEKS GESTATION OF PREGNANCY: ICD-10-CM

## 2023-02-27 DIAGNOSIS — O09.93 SUPERVISION OF HIGH RISK PREGNANCY IN THIRD TRIMESTER: Primary | ICD-10-CM

## 2023-02-27 DIAGNOSIS — O99.213 OBESITY AFFECTING PREGNANCY IN THIRD TRIMESTER: ICD-10-CM

## 2023-02-27 DIAGNOSIS — Z36.89 ENCOUNTER FOR ULTRASOUND TO ASSESS FETAL GROWTH: ICD-10-CM

## 2023-02-27 DIAGNOSIS — O36.8390 ULTRASOUND SCAN DONE FOR INABILITY TO HEAR FETAL HEART TONES: ICD-10-CM

## 2023-02-27 PROBLEM — N93.9 VAGINAL SPOTTING: Status: RESOLVED | Noted: 2023-02-09 | Resolved: 2023-02-27

## 2023-02-27 PROBLEM — O98.813 CHLAMYDIA INFECTION AFFECTING PREGNANCY IN THIRD TRIMESTER: Status: RESOLVED | Noted: 2023-02-13 | Resolved: 2023-02-27

## 2023-02-27 PROBLEM — A74.9 CHLAMYDIA INFECTION AFFECTING PREGNANCY IN THIRD TRIMESTER: Status: RESOLVED | Noted: 2023-02-13 | Resolved: 2023-02-27

## 2023-02-27 PROCEDURE — 99212 OFFICE O/P EST SF 10 MIN: CPT | Mod: PBBFAC,TH,25 | Performed by: ADVANCED PRACTICE MIDWIFE

## 2023-02-27 PROCEDURE — 99213 PR OFFICE/OUTPT VISIT, EST, LEVL III, 20-29 MIN: ICD-10-PCS | Mod: TH,S$PBB,, | Performed by: ADVANCED PRACTICE MIDWIFE

## 2023-02-27 PROCEDURE — 99999 PR PBB SHADOW E&M-EST. PATIENT-LVL II: ICD-10-PCS | Mod: PBBFAC,,, | Performed by: ADVANCED PRACTICE MIDWIFE

## 2023-02-27 PROCEDURE — 99999 PR PBB SHADOW E&M-EST. PATIENT-LVL II: CPT | Mod: PBBFAC,,, | Performed by: ADVANCED PRACTICE MIDWIFE

## 2023-02-27 PROCEDURE — 76819 FETAL BIOPHYS PROFIL W/O NST: CPT | Mod: PBBFAC | Performed by: OBSTETRICS & GYNECOLOGY

## 2023-02-27 PROCEDURE — 76819 US OB/GYN PROCEDURE (VIEWPOINT): ICD-10-PCS | Mod: 26,S$PBB,, | Performed by: OBSTETRICS & GYNECOLOGY

## 2023-02-27 PROCEDURE — 99213 OFFICE O/P EST LOW 20 MIN: CPT | Mod: TH,S$PBB,, | Performed by: ADVANCED PRACTICE MIDWIFE

## 2023-02-27 NOTE — PROGRESS NOTES
22 y.o. female  at 32w5d   Reports + FM, denies VB, LOF or CTX  Doing well without concerns  Son done Baby breech,BPP 8/8 MVP 6.   TW lbs   Reviewed 28wk lab results (O POS)  Tdap given   Anemia  Reviewed warning signs, normal FKCs,  labor precautions and how/when to call.  RTC x 1 wks, call or present sooner prn.     I spent a total of 15 minutes on the day of the visit.This includes face to face time and non-face to face time preparing to see the patient (eg, review of tests), Obtaining and/or reviewing separately obtained history, Documenting clinical information in the electronic or other health record, Independently interpreting resultsand communicating results to the patient/family/caregiver, or Care coordination.

## 2023-02-27 NOTE — TELEPHONE ENCOUNTER
----- Message from Naty Garcia sent at 2/27/2023  1:16 PM CST -----  Contact: [phone]  Patient is scheduled for ob  and they called to say they will be 20 minutes late to their appt due to transporation . Patient can be reached at [phone]. Thanks KB

## 2023-02-28 ENCOUNTER — PATIENT MESSAGE (OUTPATIENT)
Dept: OBSTETRICS AND GYNECOLOGY | Facility: CLINIC | Age: 23
End: 2023-02-28
Payer: MEDICAID

## 2023-03-01 ENCOUNTER — PATIENT MESSAGE (OUTPATIENT)
Dept: OBSTETRICS AND GYNECOLOGY | Facility: CLINIC | Age: 23
End: 2023-03-01
Payer: MEDICAID

## 2023-03-02 ENCOUNTER — PATIENT MESSAGE (OUTPATIENT)
Dept: OBSTETRICS AND GYNECOLOGY | Facility: CLINIC | Age: 23
End: 2023-03-02
Payer: MEDICAID

## 2023-03-06 ENCOUNTER — PATIENT MESSAGE (OUTPATIENT)
Dept: OBSTETRICS AND GYNECOLOGY | Facility: CLINIC | Age: 23
End: 2023-03-06
Payer: MEDICAID

## 2023-03-06 RX ORDER — SERTRALINE HYDROCHLORIDE 50 MG/1
100 TABLET, FILM COATED ORAL DAILY
Qty: 60 TABLET | Refills: 6 | Status: SHIPPED | OUTPATIENT
Start: 2023-03-06 | End: 2023-03-08

## 2023-03-06 RX ORDER — SERTRALINE HYDROCHLORIDE 50 MG/1
50 TABLET, FILM COATED ORAL DAILY
Qty: 30 TABLET | Refills: 6 | Status: SHIPPED | OUTPATIENT
Start: 2023-03-06 | End: 2023-03-06 | Stop reason: SDUPTHER

## 2023-03-07 ENCOUNTER — PATIENT MESSAGE (OUTPATIENT)
Dept: OBSTETRICS AND GYNECOLOGY | Facility: CLINIC | Age: 23
End: 2023-03-07
Payer: MEDICAID

## 2023-03-07 NOTE — TELEPHONE ENCOUNTER
"Informed patient that we do not actually schedule transportation. While scheduling an appointment, we are asked "will patient need transportation". We then click yes or no. Patient stated that she was informed that we can set the amount of people that are able to ride transportation. Informed patient that someone from the transportation dept reached out and schedules. Advised patient to contact that department. Patient  verbalized understanding.  "

## 2023-03-08 ENCOUNTER — ROUTINE PRENATAL (OUTPATIENT)
Dept: OBSTETRICS AND GYNECOLOGY | Facility: CLINIC | Age: 23
End: 2023-03-08
Payer: MEDICAID

## 2023-03-08 ENCOUNTER — PROCEDURE VISIT (OUTPATIENT)
Dept: OBSTETRICS AND GYNECOLOGY | Facility: CLINIC | Age: 23
End: 2023-03-08
Payer: MEDICAID

## 2023-03-08 ENCOUNTER — PATIENT MESSAGE (OUTPATIENT)
Dept: OBSTETRICS AND GYNECOLOGY | Facility: CLINIC | Age: 23
End: 2023-03-08

## 2023-03-08 VITALS — DIASTOLIC BLOOD PRESSURE: 80 MMHG | WEIGHT: 293 LBS | BODY MASS INDEX: 56.15 KG/M2 | SYSTOLIC BLOOD PRESSURE: 118 MMHG

## 2023-03-08 DIAGNOSIS — O36.8390 ULTRASOUND SCAN DONE FOR INABILITY TO HEAR FETAL HEART TONES: ICD-10-CM

## 2023-03-08 DIAGNOSIS — O99.213 OBESITY AFFECTING PREGNANCY IN THIRD TRIMESTER: Primary | ICD-10-CM

## 2023-03-08 DIAGNOSIS — Z36.89 ENCOUNTER FOR ULTRASOUND TO ASSESS FETAL GROWTH: ICD-10-CM

## 2023-03-08 PROCEDURE — 99999 PR PBB SHADOW E&M-EST. PATIENT-LVL II: ICD-10-PCS | Mod: PBBFAC,,, | Performed by: ADVANCED PRACTICE MIDWIFE

## 2023-03-08 PROCEDURE — 99213 OFFICE O/P EST LOW 20 MIN: CPT | Mod: TH,S$PBB,25, | Performed by: ADVANCED PRACTICE MIDWIFE

## 2023-03-08 PROCEDURE — 76819 FETAL BIOPHYS PROFIL W/O NST: CPT | Mod: 26,S$PBB,, | Performed by: OBSTETRICS & GYNECOLOGY

## 2023-03-08 PROCEDURE — 99213 PR OFFICE/OUTPT VISIT, EST, LEVL III, 20-29 MIN: ICD-10-PCS | Mod: TH,S$PBB,25, | Performed by: ADVANCED PRACTICE MIDWIFE

## 2023-03-08 PROCEDURE — 99212 OFFICE O/P EST SF 10 MIN: CPT | Mod: PBBFAC,25,TH | Performed by: ADVANCED PRACTICE MIDWIFE

## 2023-03-08 PROCEDURE — 76816 OB US FOLLOW-UP PER FETUS: CPT | Mod: PBBFAC | Performed by: OBSTETRICS & GYNECOLOGY

## 2023-03-08 PROCEDURE — 99999 PR PBB SHADOW E&M-EST. PATIENT-LVL II: CPT | Mod: PBBFAC,,, | Performed by: ADVANCED PRACTICE MIDWIFE

## 2023-03-08 PROCEDURE — 76819 US OB/GYN PROCEDURE (VIEWPOINT): ICD-10-PCS | Mod: 26,S$PBB,, | Performed by: OBSTETRICS & GYNECOLOGY

## 2023-03-08 PROCEDURE — 76816 US OB/GYN PROCEDURE (VIEWPOINT): ICD-10-PCS | Mod: 26,S$PBB,, | Performed by: OBSTETRICS & GYNECOLOGY

## 2023-03-08 RX ORDER — SERTRALINE HYDROCHLORIDE 50 MG/1
100 TABLET, FILM COATED ORAL DAILY
Qty: 60 TABLET | Refills: 6 | Status: SHIPPED | OUTPATIENT
Start: 2023-03-08 | End: 2023-04-24

## 2023-03-09 ENCOUNTER — PATIENT MESSAGE (OUTPATIENT)
Dept: OBSTETRICS AND GYNECOLOGY | Facility: CLINIC | Age: 23
End: 2023-03-09
Payer: MEDICAID

## 2023-03-10 ENCOUNTER — PATIENT MESSAGE (OUTPATIENT)
Dept: OBSTETRICS AND GYNECOLOGY | Facility: CLINIC | Age: 23
End: 2023-03-10
Payer: MEDICAID

## 2023-03-13 PROBLEM — O99.213 OBESITY AFFECTING PREGNANCY IN THIRD TRIMESTER: Status: ACTIVE | Noted: 2022-12-13

## 2023-03-13 NOTE — PROGRESS NOTES
22 y.o. female  at 34w0d   Reports + FM, denies VB, LOF or regular CTX  Doing well without concerns, aware of need for genprobe at 36wks  /80   Wt (!) 167.5 kg (369 lb 4.3 oz)   BMI 56.15 kg/m²   TW lbs   GBS reviewed  Obesity affecting pregnancy in third trimester    Other orders  -     sertraline (ZOLOFT) 50 MG tablet; Take 2 tablets (100 mg total) by mouth once daily.  Dispense: 60 tablet; Refill: 6    Ultrasound today with cephalic presentation, MVP 6.3cm, OTILIO 21.4cm, EFW 26%, 5lb 1oz, BPP 8/8, AC 19%, reviewed with pt and   Reviewed warning signs, normal FKCs, labor precautions and how/when to call.  RTC x 2 wks, call or present sooner prn.

## 2023-03-15 ENCOUNTER — PATIENT MESSAGE (OUTPATIENT)
Dept: OBSTETRICS AND GYNECOLOGY | Facility: CLINIC | Age: 23
End: 2023-03-15

## 2023-03-15 ENCOUNTER — PATIENT MESSAGE (OUTPATIENT)
Dept: OTHER | Facility: OTHER | Age: 23
End: 2023-03-15
Payer: MEDICAID

## 2023-03-15 ENCOUNTER — PROCEDURE VISIT (OUTPATIENT)
Dept: OBSTETRICS AND GYNECOLOGY | Facility: CLINIC | Age: 23
End: 2023-03-15
Payer: MEDICAID

## 2023-03-15 ENCOUNTER — LAB VISIT (OUTPATIENT)
Dept: LAB | Facility: HOSPITAL | Age: 23
End: 2023-03-15
Attending: MIDWIFE
Payer: MEDICAID

## 2023-03-15 ENCOUNTER — ROUTINE PRENATAL (OUTPATIENT)
Dept: OBSTETRICS AND GYNECOLOGY | Facility: CLINIC | Age: 23
End: 2023-03-15
Payer: MEDICAID

## 2023-03-15 VITALS — DIASTOLIC BLOOD PRESSURE: 68 MMHG | BODY MASS INDEX: 56.88 KG/M2 | WEIGHT: 293 LBS | SYSTOLIC BLOOD PRESSURE: 120 MMHG

## 2023-03-15 DIAGNOSIS — O99.213 OBESITY AFFECTING PREGNANCY IN THIRD TRIMESTER: Primary | ICD-10-CM

## 2023-03-15 DIAGNOSIS — O36.8390 ULTRASOUND SCAN DONE FOR INABILITY TO HEAR FETAL HEART TONES: ICD-10-CM

## 2023-03-15 DIAGNOSIS — O99.213 OBESITY AFFECTING PREGNANCY IN THIRD TRIMESTER: ICD-10-CM

## 2023-03-15 DIAGNOSIS — Z36.89 ENCOUNTER FOR ULTRASOUND TO ASSESS FETAL GROWTH: ICD-10-CM

## 2023-03-15 PROCEDURE — 99999 PR PBB SHADOW E&M-EST. PATIENT-LVL III: CPT | Mod: PBBFAC,,, | Performed by: ADVANCED PRACTICE MIDWIFE

## 2023-03-15 PROCEDURE — 76819 FETAL BIOPHYS PROFIL W/O NST: CPT | Mod: PBBFAC | Performed by: OBSTETRICS & GYNECOLOGY

## 2023-03-15 PROCEDURE — 83020 HEMOGLOBIN ELECTROPHORESIS: CPT | Performed by: ADVANCED PRACTICE MIDWIFE

## 2023-03-15 PROCEDURE — 99999 PR PBB SHADOW E&M-EST. PATIENT-LVL III: ICD-10-PCS | Mod: PBBFAC,,, | Performed by: ADVANCED PRACTICE MIDWIFE

## 2023-03-15 PROCEDURE — 99214 PR OFFICE/OUTPT VISIT, EST, LEVL IV, 30-39 MIN: ICD-10-PCS | Mod: TH,S$PBB,, | Performed by: ADVANCED PRACTICE MIDWIFE

## 2023-03-15 PROCEDURE — 99214 OFFICE O/P EST MOD 30 MIN: CPT | Mod: TH,S$PBB,, | Performed by: ADVANCED PRACTICE MIDWIFE

## 2023-03-15 PROCEDURE — 99213 OFFICE O/P EST LOW 20 MIN: CPT | Mod: PBBFAC,TH | Performed by: ADVANCED PRACTICE MIDWIFE

## 2023-03-15 PROCEDURE — 76819 US OB/GYN PROCEDURE (VIEWPOINT): ICD-10-PCS | Mod: 26,S$PBB,, | Performed by: OBSTETRICS & GYNECOLOGY

## 2023-03-15 PROCEDURE — 36415 COLL VENOUS BLD VENIPUNCTURE: CPT | Performed by: ADVANCED PRACTICE MIDWIFE

## 2023-03-16 NOTE — PROGRESS NOTES
22 y.o. female  at 35w1d   Reports + FM, denies VB, LOF or regular CTX  Doing well, common discomforts of pregnancy reviewed  /68   Wt (!) 169.7 kg (374 lb 1.9 oz)   BMI 56.88 kg/m²   TW lbs   GBS handout provided and reviewed, will obtain at [facility]  Genprobe to be collected at [facility]  Obesity affecting pregnancy in third trimester  -     HEMOGLOBIN ELECTROPHORESIS,HGB A2 MIKO.; Future; Expected date: 03/15/2023    Ultrasound today with cephalic presentation, posterior placenta, 3VC, OTILIO WNL, MVP 5.2cm, BPP 8/8, reviewed with pt and FOB  Reviewed warning signs, normal FKCs, labor precautions and how/when to call.  RTC x 2 wks, call or present sooner prn.

## 2023-03-18 ENCOUNTER — TELEPHONE (OUTPATIENT)
Dept: OBSTETRICS AND GYNECOLOGY | Facility: HOSPITAL | Age: 23
End: 2023-03-18
Payer: MEDICAID

## 2023-03-18 ENCOUNTER — HOSPITAL ENCOUNTER (OUTPATIENT)
Facility: HOSPITAL | Age: 23
Discharge: HOME | End: 2023-03-18
Attending: OBSTETRICS & GYNECOLOGY | Admitting: OBSTETRICS & GYNECOLOGY
Payer: MEDICAID

## 2023-03-18 VITALS
TEMPERATURE: 99 F | SYSTOLIC BLOOD PRESSURE: 140 MMHG | OXYGEN SATURATION: 99 % | RESPIRATION RATE: 18 BRPM | DIASTOLIC BLOOD PRESSURE: 86 MMHG | HEART RATE: 91 BPM

## 2023-03-18 DIAGNOSIS — O12.03 LEG SWELLING IN PREGNANCY IN THIRD TRIMESTER: ICD-10-CM

## 2023-03-18 LAB
ALBUMIN SERPL BCP-MCNC: 2.4 G/DL (ref 3.5–5.2)
ALP SERPL-CCNC: 119 U/L (ref 55–135)
ALT SERPL W/O P-5'-P-CCNC: 12 U/L (ref 10–44)
ANION GAP SERPL CALC-SCNC: 8 MMOL/L (ref 8–16)
AST SERPL-CCNC: 10 U/L (ref 10–40)
BASOPHILS # BLD AUTO: 0.05 K/UL (ref 0–0.2)
BASOPHILS NFR BLD: 0.4 % (ref 0–1.9)
BILIRUB SERPL-MCNC: 0.3 MG/DL (ref 0.1–1)
BILIRUB UR QL STRIP: NEGATIVE
BUN SERPL-MCNC: 10 MG/DL (ref 6–20)
CALCIUM SERPL-MCNC: 9.3 MG/DL (ref 8.7–10.5)
CHLORIDE SERPL-SCNC: 110 MMOL/L (ref 95–110)
CLARITY UR: ABNORMAL
CO2 SERPL-SCNC: 22 MMOL/L (ref 23–29)
COLOR UR: YELLOW
CREAT SERPL-MCNC: 0.8 MG/DL (ref 0.5–1.4)
CREAT UR-MCNC: 125.8 MG/DL (ref 15–325)
DIFFERENTIAL METHOD: ABNORMAL
EOSINOPHIL # BLD AUTO: 0.1 K/UL (ref 0–0.5)
EOSINOPHIL NFR BLD: 1.1 % (ref 0–8)
ERYTHROCYTE [DISTWIDTH] IN BLOOD BY AUTOMATED COUNT: 14.2 % (ref 11.5–14.5)
EST. GFR  (NO RACE VARIABLE): >60 ML/MIN/1.73 M^2
GLUCOSE SERPL-MCNC: 81 MG/DL (ref 70–110)
GLUCOSE UR QL STRIP: NEGATIVE
HCT VFR BLD AUTO: 35.5 % (ref 37–48.5)
HGB A2 MFR BLD HPLC: 2.6 % (ref 2.2–3.2)
HGB BLD-MCNC: 11.1 G/DL (ref 12–16)
HGB FRACT BLD ELPH-IMP: NORMAL
HGB FRACT BLD ELPH-IMP: NORMAL
HGB UR QL STRIP: NEGATIVE
IMM GRANULOCYTES # BLD AUTO: 0.06 K/UL (ref 0–0.04)
IMM GRANULOCYTES NFR BLD AUTO: 0.5 % (ref 0–0.5)
KETONES UR QL STRIP: NEGATIVE
LEUKOCYTE ESTERASE UR QL STRIP: NEGATIVE
LYMPHOCYTES # BLD AUTO: 2.1 K/UL (ref 1–4.8)
LYMPHOCYTES NFR BLD: 18.8 % (ref 18–48)
MCH RBC QN AUTO: 25.2 PG (ref 27–31)
MCHC RBC AUTO-ENTMCNC: 31.3 G/DL (ref 32–36)
MCV RBC AUTO: 81 FL (ref 82–98)
MONOCYTES # BLD AUTO: 0.4 K/UL (ref 0.3–1)
MONOCYTES NFR BLD: 3.9 % (ref 4–15)
NEUTROPHILS # BLD AUTO: 8.5 K/UL (ref 1.8–7.7)
NEUTROPHILS NFR BLD: 75.3 % (ref 38–73)
NITRITE UR QL STRIP: NEGATIVE
NRBC BLD-RTO: 0 /100 WBC
PH UR STRIP: 7 [PH] (ref 5–8)
PLATELET # BLD AUTO: 246 K/UL (ref 150–450)
PMV BLD AUTO: 10.9 FL (ref 9.2–12.9)
POTASSIUM SERPL-SCNC: 4.6 MMOL/L (ref 3.5–5.1)
PROT SERPL-MCNC: 6.3 G/DL (ref 6–8.4)
PROT UR QL STRIP: ABNORMAL
PROT UR-MCNC: 12 MG/DL (ref 0–15)
PROT/CREAT UR: 0.1 MG/G{CREAT} (ref 0–0.2)
RBC # BLD AUTO: 4.4 M/UL (ref 4–5.4)
SODIUM SERPL-SCNC: 140 MMOL/L (ref 136–145)
SP GR UR STRIP: 1.02 (ref 1–1.03)
URN SPEC COLLECT METH UR: ABNORMAL
UROBILINOGEN UR STRIP-ACNC: NEGATIVE EU/DL
WBC # BLD AUTO: 11.34 K/UL (ref 3.9–12.7)

## 2023-03-18 PROCEDURE — 85025 COMPLETE CBC W/AUTO DIFF WBC: CPT | Performed by: MIDWIFE

## 2023-03-18 PROCEDURE — 82570 ASSAY OF URINE CREATININE: CPT | Performed by: MIDWIFE

## 2023-03-18 PROCEDURE — 99213 PR OFFICE/OUTPT VISIT, EST, LEVL III, 20-29 MIN: ICD-10-PCS | Mod: TH,25,, | Performed by: MIDWIFE

## 2023-03-18 PROCEDURE — 99211 OFF/OP EST MAY X REQ PHY/QHP: CPT | Mod: 25

## 2023-03-18 PROCEDURE — 99213 OFFICE O/P EST LOW 20 MIN: CPT | Mod: TH,25,, | Performed by: MIDWIFE

## 2023-03-18 PROCEDURE — 36415 COLL VENOUS BLD VENIPUNCTURE: CPT | Performed by: MIDWIFE

## 2023-03-18 PROCEDURE — 80053 COMPREHEN METABOLIC PANEL: CPT | Performed by: MIDWIFE

## 2023-03-18 PROCEDURE — 59025 OBTAIN FETAL NONSTRESS TEST (NST): ICD-10-PCS | Mod: 26,,, | Performed by: MIDWIFE

## 2023-03-18 PROCEDURE — 59025 FETAL NON-STRESS TEST: CPT | Mod: 26,,, | Performed by: MIDWIFE

## 2023-03-18 PROCEDURE — 81003 URINALYSIS AUTO W/O SCOPE: CPT | Performed by: MIDWIFE

## 2023-03-18 PROCEDURE — 59025 FETAL NON-STRESS TEST: CPT

## 2023-03-18 RX ORDER — ACETAMINOPHEN 500 MG
500 TABLET ORAL EVERY 6 HOURS PRN
Status: DISCONTINUED | OUTPATIENT
Start: 2023-03-18 | End: 2023-03-18 | Stop reason: HOSPADM

## 2023-03-18 RX ORDER — SODIUM CHLORIDE, SODIUM LACTATE, POTASSIUM CHLORIDE, CALCIUM CHLORIDE 600; 310; 30; 20 MG/100ML; MG/100ML; MG/100ML; MG/100ML
INJECTION, SOLUTION INTRAVENOUS CONTINUOUS
Status: DISCONTINUED | OUTPATIENT
Start: 2023-03-18 | End: 2023-03-18 | Stop reason: HOSPADM

## 2023-03-18 RX ORDER — ONDANSETRON 8 MG/1
8 TABLET, ORALLY DISINTEGRATING ORAL EVERY 8 HOURS PRN
Status: DISCONTINUED | OUTPATIENT
Start: 2023-03-18 | End: 2023-03-18 | Stop reason: HOSPADM

## 2023-03-18 RX ORDER — PROCHLORPERAZINE EDISYLATE 5 MG/ML
5 INJECTION INTRAMUSCULAR; INTRAVENOUS EVERY 6 HOURS PRN
Status: DISCONTINUED | OUTPATIENT
Start: 2023-03-18 | End: 2023-03-18 | Stop reason: HOSPADM

## 2023-03-18 NOTE — NURSING
Gave patient AVS and educated on  discharge information. Educated on nutrition, hydration, home medications, fetal kick counts, activity, s/s of OB emergencies, and reasons to notify OB provider (including vaginal bleeding like a period, rupture of membranes, constant and strong abdominal pain or tenderness that does not go away, contractions every 3-5 min for 1-2 hours that are increasing in strength, changes in urination such as hematuria/oliguria/dysuria/urgency/frequency, temp greater than 100.4 F). Pre-eclampsia precautions. Patient verbalized understanding.    Plans to keep appt on Wednesday 3/22.     Phone number given to L&D for further questions and concerns.

## 2023-03-18 NOTE — TELEPHONE ENCOUNTER
Patient reports significant swelling of both hands and feet/ankles and also reports a decrease in fetal movement today. Patient denies having a history of hypertension and denies any visual changes, upper abdominal pain, headaches or shortness of breath. Patient instructed to come to triage for evaluation.

## 2023-03-18 NOTE — DISCHARGE SUMMARY
[facility]  Obstetrics  Discharge Summary      Patient Name: Ruth Ann Oropeza  MRN: [id number]  Admission Date: 3/18/2023  Hospital Length of Stay: 0 days  Discharge Date and Time:  2023 2:22 PM  Attending Physician: Anna Marie Auguste MD   Discharging Provider: Charlene Angel CNM   Primary Care Provider: Primary Doctor No    HPI: 22 y.o.  WES 2023 EGA 35w3d c/o swelling in feet and hands. Also with c/o urinary frequency.       FHT: 140 Cat 1 (reassuring)  TOCO:  none    * No surgery found *     Hospital Course:   NST  Continuous monitoring   Pre-eclampsia w/u  Serial Blood pressures  UA   Labs wnl, serial blood pressures slightly elevated. Will continue to monitor outpatient. Pre-eclampsia s/s discussed and precautions given. Has weekly apts in clinic. Keep next apt. NST reactive.            Final Active Diagnoses:    Diagnosis Date Noted POA    PRINCIPAL PROBLEM:  Leg swelling in pregnancy in third trimester [O12.03] 2023 Yes      Problems Resolved During this Admission:        Significant Diagnostic Studies: Labs: All labs within the past 24 hours have been reviewed      Feeding Method: breast    Immunizations     None          This patient has no babies on file.  Pending Diagnostic Studies:     None          Discharged Condition: good    Disposition: Home or Self Care    Follow Up:    Patient Instructions:   No discharge procedures on file.  Medications:  Current Discharge Medication List      CONTINUE these medications which have NOT CHANGED    Details   albuterol (PROVENTIL/VENTOLIN HFA) 90 mcg/actuation inhaler Inhale into the lungs.      aspirin (ECOTRIN) 81 MG EC tablet Take 1 tablet (81 mg total) by mouth once. for 1 dose  Qty: 90 tablet, Refills: 1    Associated Diagnoses: Obesity affecting pregnancy in second trimester; 24 weeks gestation of pregnancy      pantoprazole (PROTONIX) 40 MG tablet Take 1 tablet (40 mg total) by mouth once daily.  Qty: 30 tablet, Refills:  1      prenatal vit103-iron fum-folic () 27 mg iron- 1 mg Tab Take 1 tablet by mouth once daily.  Qty: 90 tablet, Refills: 3    Comments: Any generic on plan may be used      sertraline (ZOLOFT) 50 MG tablet Take 2 tablets (100 mg total) by mouth once daily.  Qty: 60 tablet, Refills: 6             Charlene Angel CNM  Obstetrics  [facility]

## 2023-03-18 NOTE — PROCEDURES
Ruth Ann Oropeza is a 22 y.o. female patient.    Pulse: 91 (03/18/23 1336)  BP: (!) 140/86 (03/18/23 1336)  SpO2: 99 % (03/18/23 1304)       Obtain Fetal nonstress test (NST)    Date/Time: 3/18/2023 2:22 PM  Performed by: Charlene Angel CNM  Authorized by: Charlene Angel CNM     Nonstress Test:     Variability:  6-25 BPM    Decelerations:  None    Accelerations:  15 bpm    Acoustic Stimulator: No      Baseline:  140    Uterine Irritability: No      Contractions:  Not present  Biophysical Profile:     Nonstress Test Interpretation: reactive      Overall Impression:  Reassuring  Post-procedure:     Patient tolerance:  Patient tolerated the procedure well with no immediate complications    3/18/2023

## 2023-03-18 NOTE — SUBJECTIVE & OBJECTIVE
Obstetric HPI:  Patient reports None contractions, active fetal movement, No vaginal bleeding , No loss of fluid     This pregnancy has been complicated by:  Obesity, asthma, chlamydia     OB History    Para Term  AB Living   1 0 0 0 0 0   SAB IAB Ectopic Multiple Live Births   0 0 0 0 0      # Outcome Date GA Lbr David/2nd Weight Sex Delivery Anes PTL Lv   1 Current              Past Medical History:   Diagnosis Date    Asthma      Past Surgical History:   Procedure Laterality Date    WISDOM TOOTH EXTRACTION         PTA Medications   Medication Sig    albuterol (PROVENTIL/VENTOLIN HFA) 90 mcg/actuation inhaler Inhale into the lungs.    aspirin (ECOTRIN) 81 MG EC tablet Take 1 tablet (81 mg total) by mouth once. for 1 dose    pantoprazole (PROTONIX) 40 MG tablet Take 1 tablet (40 mg total) by mouth once daily.    prenatal vit103-iron fum-folic () 27 mg iron- 1 mg Tab Take 1 tablet by mouth once daily.    sertraline (ZOLOFT) 50 MG tablet Take 2 tablets (100 mg total) by mouth once daily.       Review of patient's allergies indicates:  No Known Allergies     Family History       Problem Relation (Age of Onset)    Hypertension Father          Tobacco Use    Smoking status: Former     Types: Cigarettes    Smokeless tobacco: Not on file   Substance and Sexual Activity    Alcohol use: Never    Drug use: Never    Sexual activity: Yes     Partners: Male     Review of Systems   Cardiovascular:  Positive for leg swelling.   Genitourinary:  Positive for urgency.    Objective:     Vital Signs (Most Recent):  Pulse: 91 (23 1336)  BP: (!) 140/86 (23 1336)  SpO2: 99 % (23 1304)   Vital Signs (24h Range):  Pulse:  [] 91  SpO2:  [98 %-99 %] 99 %  BP: (118-148)/(64-98) 140/86        There is no height or weight on file to calculate BMI.    FHT: 140 Cat 1 (reassuring)  TOCO:  none    Physical Exam:   Constitutional: She is oriented to person, place, and time. She appears well-developed and  well-nourished.    HENT:   Head: Normocephalic.    Eyes: Conjunctivae are normal.      Pulmonary/Chest: Effort normal.        Abdominal: Soft.             Musculoskeletal: Normal range of motion. Edema present.       Neurological: She is alert and oriented to person, place, and time.    Skin: Skin is warm and dry.    Psychiatric: She has a normal mood and affect. Her behavior is normal. Judgment and thought content normal.     Cervix: deferred     Significant Labs:  Lab Results   Component Value Date    [facility]    HEPBSAG Negative 11/30/2022       I have personallly reviewed all pertinent lab results from the last 24 hours.

## 2023-03-18 NOTE — HOSPITAL COURSE
NST  Continuous monitoring   Pre-eclampsia w/u  Serial Blood pressures  UA   Labs wnl, serial blood pressures slightly elevated. Will continue to monitor outpatient. Pre-eclampsia s/s discussed and precautions given. Has weekly apts in clinic. Keep next apt. NST reactive.

## 2023-03-18 NOTE — H&P
[facility]  Obstetrics  History & Physical    Patient Name: Ruth Ann Oropeza  MRN: [id number]  Admission Date: 3/18/2023  Primary Care Provider: Primary Doctor No    Subjective:     Principal Problem:Leg swelling in pregnancy in third trimester    History of Present Illness:  22 y.o.  WES 2023 EGA 35w3d c/o swelling in feet and hands. Also with c/o urinary frequency.       Obstetric HPI:  Patient reports None contractions, active fetal movement, No vaginal bleeding , No loss of fluid     This pregnancy has been complicated by:  Obesity, asthma, chlamydia     OB History    Para Term  AB Living   1 0 0 0 0 0   SAB IAB Ectopic Multiple Live Births   0 0 0 0 0      # Outcome Date GA Lbr David/2nd Weight Sex Delivery Anes PTL Lv   1 Current              Past Medical History:   Diagnosis Date    Asthma      Past Surgical History:   Procedure Laterality Date    WISDOM TOOTH EXTRACTION         PTA Medications   Medication Sig    albuterol (PROVENTIL/VENTOLIN HFA) 90 mcg/actuation inhaler Inhale into the lungs.    aspirin (ECOTRIN) 81 MG EC tablet Take 1 tablet (81 mg total) by mouth once. for 1 dose    pantoprazole (PROTONIX) 40 MG tablet Take 1 tablet (40 mg total) by mouth once daily.    prenatal vit103-iron fum-folic () 27 mg iron- 1 mg Tab Take 1 tablet by mouth once daily.    sertraline (ZOLOFT) 50 MG tablet Take 2 tablets (100 mg total) by mouth once daily.       Review of patient's allergies indicates:  No Known Allergies     Family History       Problem Relation (Age of Onset)    Hypertension Father          Tobacco Use    Smoking status: Former     Types: Cigarettes    Smokeless tobacco: Not on file   Substance and Sexual Activity    Alcohol use: Never    Drug use: Never    Sexual activity: Yes     Partners: Male     Review of Systems   Cardiovascular:  Positive for leg swelling.   Genitourinary:  Positive for urgency.    Objective:     Vital Signs (Most  Recent):  Pulse: 91 (23 1336)  BP: (!) 140/86 (23 1336)  SpO2: 99 % (23 1304)   Vital Signs (24h Range):  Pulse:  [] 91  SpO2:  [98 %-99 %] 99 %  BP: (118-148)/(64-98) 140/86        There is no height or weight on file to calculate BMI.    FHT: 140 Cat 1 (reassuring)  TOCO:  none    Physical Exam:   Constitutional: She is oriented to person, place, and time. She appears well-developed and well-nourished.    HENT:   Head: Normocephalic.    Eyes: Conjunctivae are normal.      Pulmonary/Chest: Effort normal.        Abdominal: Soft.             Musculoskeletal: Normal range of motion. Edema present.       Neurological: She is alert and oriented to person, place, and time.    Skin: Skin is warm and dry.    Psychiatric: She has a normal mood and affect. Her behavior is normal. Judgment and thought content normal.     Cervix: deferred     Significant Labs:  Lab Results   Component Value Date    GROUPTRH O POS 2023    HEPBSAG Negative 2022       I have personallly reviewed all pertinent lab results from the last 24 hours.    Assessment/Plan:     22 y.o. female  at 35w3d for:    * Leg swelling in pregnancy in third trimester  NST  Continuous monitoring   Pre-eclampsia w/u  Serial Blood pressures  JOHN Angel CNM  Obstetrics  [facility]

## 2023-03-18 NOTE — DISCHARGE INSTRUCTIONS

## 2023-03-22 ENCOUNTER — ROUTINE PRENATAL (OUTPATIENT)
Dept: OBSTETRICS AND GYNECOLOGY | Facility: CLINIC | Age: 23
End: 2023-03-22
Payer: MEDICAID

## 2023-03-22 ENCOUNTER — PROCEDURE VISIT (OUTPATIENT)
Dept: OBSTETRICS AND GYNECOLOGY | Facility: CLINIC | Age: 23
End: 2023-03-22
Payer: MEDICAID

## 2023-03-22 VITALS — BODY MASS INDEX: 57.72 KG/M2 | DIASTOLIC BLOOD PRESSURE: 84 MMHG | WEIGHT: 293 LBS | SYSTOLIC BLOOD PRESSURE: 132 MMHG

## 2023-03-22 DIAGNOSIS — O36.8390 ULTRASOUND SCAN DONE FOR INABILITY TO HEAR FETAL HEART TONES: ICD-10-CM

## 2023-03-22 DIAGNOSIS — A74.9 CHLAMYDIA INFECTION AFFECTING PREGNANCY IN THIRD TRIMESTER: ICD-10-CM

## 2023-03-22 DIAGNOSIS — O99.213 OBESITY AFFECTING PREGNANCY IN THIRD TRIMESTER: Primary | ICD-10-CM

## 2023-03-22 DIAGNOSIS — Z36.89 ENCOUNTER FOR ULTRASOUND TO ASSESS FETAL GROWTH: ICD-10-CM

## 2023-03-22 DIAGNOSIS — O98.813 CHLAMYDIA INFECTION AFFECTING PREGNANCY IN THIRD TRIMESTER: ICD-10-CM

## 2023-03-22 PROCEDURE — 99214 PR OFFICE/OUTPT VISIT, EST, LEVL IV, 30-39 MIN: ICD-10-PCS | Mod: 25,TH,S$PBB,ICN | Performed by: ADVANCED PRACTICE MIDWIFE

## 2023-03-22 PROCEDURE — 99214 OFFICE O/P EST MOD 30 MIN: CPT | Mod: 25,TH,S$PBB,ICN | Performed by: ADVANCED PRACTICE MIDWIFE

## 2023-03-22 PROCEDURE — 76819 US OB/GYN PROCEDURE (VIEWPOINT): ICD-10-PCS | Mod: 26,S$PBB,, | Performed by: OBSTETRICS & GYNECOLOGY

## 2023-03-22 PROCEDURE — 99212 OFFICE O/P EST SF 10 MIN: CPT | Mod: PBBFAC,TH,25 | Performed by: ADVANCED PRACTICE MIDWIFE

## 2023-03-22 PROCEDURE — 87591 N.GONORRHOEAE DNA AMP PROB: CPT | Performed by: ADVANCED PRACTICE MIDWIFE

## 2023-03-22 PROCEDURE — 87081 CULTURE SCREEN ONLY: CPT | Performed by: ADVANCED PRACTICE MIDWIFE

## 2023-03-22 PROCEDURE — 76819 FETAL BIOPHYS PROFIL W/O NST: CPT | Mod: PBBFAC | Performed by: OBSTETRICS & GYNECOLOGY

## 2023-03-22 PROCEDURE — 99999 PR PBB SHADOW E&M-EST. PATIENT-LVL II: CPT | Mod: PBBFAC,,, | Performed by: ADVANCED PRACTICE MIDWIFE

## 2023-03-22 PROCEDURE — 99999 PR PBB SHADOW E&M-EST. PATIENT-LVL II: ICD-10-PCS | Mod: PBBFAC,,, | Performed by: ADVANCED PRACTICE MIDWIFE

## 2023-03-23 ENCOUNTER — PATIENT MESSAGE (OUTPATIENT)
Dept: OBSTETRICS AND GYNECOLOGY | Facility: CLINIC | Age: 23
End: 2023-03-23
Payer: MEDICAID

## 2023-03-23 ENCOUNTER — PATIENT MESSAGE (OUTPATIENT)
Dept: OBSTETRICS AND GYNECOLOGY | Facility: HOSPITAL | Age: 23
End: 2023-03-23
Payer: MEDICAID

## 2023-03-23 LAB
C TRACH DNA SPEC QL NAA+PROBE: NOT DETECTED
N GONORRHOEA DNA SPEC QL NAA+PROBE: NOT DETECTED

## 2023-03-23 NOTE — PROGRESS NOTES
22 y.o. female  at 36w0d  Reports + FM, denies VB, LOF or regular CTX  Doing ok, does have some common pregnancy discomforts, suggestions made  Discussed OTC options for helping with cervical ripening  /84   Wt (!) 172.2 kg (379 lb 10.1 oz)   BMI 57.72 kg/m²   TW lbs   GBS collected today   VE per pt request  Genprobe collected for PUNEET  The skin of the suprapubic region was evaluated and appears clean, dry and intact.    Ultrasound today with cephalic presentation, posterior placenta, 3VC, OTILIO WNL, MVP 5.4cm, BPP 8/8, reviewed with pt and FOB  Obesity affecting pregnancy in third trimester  -     STREP B SCREEN, VAGINAL / RECTAL    Chlamydia infection affecting pregnancy in third trimester  -     C. trachomatis/N. gonorrhoeae by AMP DNA    Reviewed warning signs, normal FKCs, labor precautions and how/when to call.  RTC x 1 wk, call or present sooner prn.

## 2023-03-24 ENCOUNTER — PATIENT MESSAGE (OUTPATIENT)
Dept: OBSTETRICS AND GYNECOLOGY | Facility: CLINIC | Age: 23
End: 2023-03-24
Payer: MEDICAID

## 2023-03-25 LAB — BACTERIA SPEC AEROBE CULT: NORMAL

## 2023-03-26 ENCOUNTER — PATIENT MESSAGE (OUTPATIENT)
Dept: OBSTETRICS AND GYNECOLOGY | Facility: CLINIC | Age: 23
End: 2023-03-26
Payer: MEDICAID

## 2023-03-28 ENCOUNTER — PROCEDURE VISIT (OUTPATIENT)
Dept: OBSTETRICS AND GYNECOLOGY | Facility: CLINIC | Age: 23
End: 2023-03-28
Payer: MEDICAID

## 2023-03-28 ENCOUNTER — ROUTINE PRENATAL (OUTPATIENT)
Dept: OBSTETRICS AND GYNECOLOGY | Facility: CLINIC | Age: 23
End: 2023-03-28
Payer: MEDICAID

## 2023-03-28 VITALS — SYSTOLIC BLOOD PRESSURE: 128 MMHG | WEIGHT: 293 LBS | DIASTOLIC BLOOD PRESSURE: 72 MMHG | BODY MASS INDEX: 58.66 KG/M2

## 2023-03-28 DIAGNOSIS — A74.9 CHLAMYDIA INFECTION AFFECTING PREGNANCY IN THIRD TRIMESTER: ICD-10-CM

## 2023-03-28 DIAGNOSIS — O36.8390 ULTRASOUND SCAN DONE FOR INABILITY TO HEAR FETAL HEART TONES: ICD-10-CM

## 2023-03-28 DIAGNOSIS — O99.213 OBESITY AFFECTING PREGNANCY IN THIRD TRIMESTER: Primary | ICD-10-CM

## 2023-03-28 DIAGNOSIS — Z36.89 ENCOUNTER FOR ULTRASOUND TO ASSESS FETAL GROWTH: ICD-10-CM

## 2023-03-28 DIAGNOSIS — O98.813 CHLAMYDIA INFECTION AFFECTING PREGNANCY IN THIRD TRIMESTER: ICD-10-CM

## 2023-03-28 PROCEDURE — 99212 OFFICE O/P EST SF 10 MIN: CPT | Mod: PBBFAC,TH,25 | Performed by: MIDWIFE

## 2023-03-28 PROCEDURE — 99213 PR OFFICE/OUTPT VISIT, EST, LEVL III, 20-29 MIN: ICD-10-PCS | Mod: TH,S$PBB,25, | Performed by: MIDWIFE

## 2023-03-28 PROCEDURE — 76816 US OB/GYN PROCEDURE (VIEWPOINT): ICD-10-PCS | Mod: 26,S$PBB,, | Performed by: OBSTETRICS & GYNECOLOGY

## 2023-03-28 PROCEDURE — 99999 PR PBB SHADOW E&M-EST. PATIENT-LVL II: CPT | Mod: PBBFAC,,, | Performed by: MIDWIFE

## 2023-03-28 PROCEDURE — 76819 FETAL BIOPHYS PROFIL W/O NST: CPT | Mod: 26,S$PBB,, | Performed by: OBSTETRICS & GYNECOLOGY

## 2023-03-28 PROCEDURE — 76819 US OB/GYN PROCEDURE (VIEWPOINT): ICD-10-PCS | Mod: 26,S$PBB,, | Performed by: OBSTETRICS & GYNECOLOGY

## 2023-03-28 PROCEDURE — 99999 PR PBB SHADOW E&M-EST. PATIENT-LVL II: ICD-10-PCS | Mod: PBBFAC,,, | Performed by: MIDWIFE

## 2023-03-28 PROCEDURE — 76816 OB US FOLLOW-UP PER FETUS: CPT | Mod: PBBFAC | Performed by: OBSTETRICS & GYNECOLOGY

## 2023-03-28 PROCEDURE — 99213 OFFICE O/P EST LOW 20 MIN: CPT | Mod: TH,S$PBB,25, | Performed by: MIDWIFE

## 2023-03-28 NOTE — PROGRESS NOTES
22 y.o. female  at 36w6d  Reports + FM, denies VB, LOF or regular CTX  Doing well without concerns. Occasional BHCs.   Growth scan today, cepahlic presentation, MVP 4.6cm, EFW 54%ile, 6lb 15oz, 8/8 BPP, AC 64%ile  TW lbs   GBS negative, pt aware   VE per pt request  Encouraged daily movement/walk to encourage labor. RRL tea and primrose oil being used. Thoroughly discussed s/s of labor and when to go to hospital.   Reviewed warning signs, normal FKCs, labor precautions and how/when to call.  RTC x 2 wks, call or present sooner prn.

## 2023-04-04 ENCOUNTER — ROUTINE PRENATAL (OUTPATIENT)
Dept: OBSTETRICS AND GYNECOLOGY | Facility: CLINIC | Age: 23
End: 2023-04-04
Payer: MEDICAID

## 2023-04-04 ENCOUNTER — HOSPITAL ENCOUNTER (INPATIENT)
Facility: HOSPITAL | Age: 23
LOS: 7 days | Discharge: HOME | End: 2023-04-11
Attending: OBSTETRICS & GYNECOLOGY | Admitting: OBSTETRICS & GYNECOLOGY
Payer: MEDICAID

## 2023-04-04 ENCOUNTER — PROCEDURE VISIT (OUTPATIENT)
Dept: OBSTETRICS AND GYNECOLOGY | Facility: CLINIC | Age: 23
End: 2023-04-04
Payer: MEDICAID

## 2023-04-04 VITALS — BODY MASS INDEX: 59.83 KG/M2 | SYSTOLIC BLOOD PRESSURE: 138 MMHG | WEIGHT: 293 LBS | DIASTOLIC BLOOD PRESSURE: 93 MMHG

## 2023-04-04 DIAGNOSIS — Z98.891 S/P CESAREAN SECTION: Primary | ICD-10-CM

## 2023-04-04 DIAGNOSIS — O36.8390 ULTRASOUND SCAN DONE FOR INABILITY TO HEAR FETAL HEART TONES: ICD-10-CM

## 2023-04-04 DIAGNOSIS — R51.9 PREGNANCY HEADACHE IN THIRD TRIMESTER: ICD-10-CM

## 2023-04-04 DIAGNOSIS — Z74.8 ASSISTANCE NEEDED WITH TRANSPORTATION: ICD-10-CM

## 2023-04-04 DIAGNOSIS — O16.9 ELEVATED BLOOD PRESSURE AFFECTING PREGNANCY, ANTEPARTUM: ICD-10-CM

## 2023-04-04 DIAGNOSIS — O13.3 GESTATIONAL HYPERTENSION WITHOUT SIGNIFICANT PROTEINURIA DURING PREGNANCY IN THIRD TRIMESTER, ANTEPARTUM: ICD-10-CM

## 2023-04-04 DIAGNOSIS — O26.893 PREGNANCY HEADACHE IN THIRD TRIMESTER: ICD-10-CM

## 2023-04-04 DIAGNOSIS — O99.213 OBESITY AFFECTING PREGNANCY IN THIRD TRIMESTER: ICD-10-CM

## 2023-04-04 DIAGNOSIS — O16.3 ELEVATED BLOOD PRESSURE AFFECTING PREGNANCY IN THIRD TRIMESTER, ANTEPARTUM: ICD-10-CM

## 2023-04-04 DIAGNOSIS — Z36.89 ENCOUNTER FOR ULTRASOUND TO ASSESS FETAL GROWTH: ICD-10-CM

## 2023-04-04 DIAGNOSIS — Z3A.37 37 WEEKS GESTATION OF PREGNANCY: Primary | ICD-10-CM

## 2023-04-04 DIAGNOSIS — O14.93 PRE-ECLAMPSIA IN THIRD TRIMESTER: ICD-10-CM

## 2023-04-04 PROBLEM — F90.9 ATTENTION DEFICIT HYPERACTIVITY DISORDER (ADHD): Status: ACTIVE | Noted: 2023-03-30

## 2023-04-04 PROBLEM — F32.A ANXIETY AND DEPRESSION: Status: ACTIVE | Noted: 2023-03-30

## 2023-04-04 PROBLEM — F41.9 ANXIETY AND DEPRESSION: Status: ACTIVE | Noted: 2023-03-30

## 2023-04-04 PROBLEM — Z34.03 PRIMIGRAVIDA IN THIRD TRIMESTER: Status: RESOLVED | Noted: 2023-04-04 | Resolved: 2023-04-04

## 2023-04-04 PROBLEM — Z34.03 PRIMIGRAVIDA IN THIRD TRIMESTER: Status: ACTIVE | Noted: 2023-04-04

## 2023-04-04 LAB
ABO + RH BLD: NORMAL
ALBUMIN SERPL BCP-MCNC: 2.4 G/DL (ref 3.5–5.2)
ALP SERPL-CCNC: 121 U/L (ref 55–135)
ALT SERPL W/O P-5'-P-CCNC: 10 U/L (ref 10–44)
ANION GAP SERPL CALC-SCNC: 10 MMOL/L (ref 8–16)
AST SERPL-CCNC: 11 U/L (ref 10–40)
BASOPHILS # BLD AUTO: 0.03 K/UL (ref 0–0.2)
BASOPHILS NFR BLD: 0.3 % (ref 0–1.9)
BILIRUB SERPL-MCNC: 0.2 MG/DL (ref 0.1–1)
BLD GP AB SCN CELLS X3 SERPL QL: NORMAL
BUN SERPL-MCNC: 11 MG/DL (ref 6–20)
CALCIUM SERPL-MCNC: 8.9 MG/DL (ref 8.7–10.5)
CHLORIDE SERPL-SCNC: 110 MMOL/L (ref 95–110)
CO2 SERPL-SCNC: 19 MMOL/L (ref 23–29)
CREAT SERPL-MCNC: 0.8 MG/DL (ref 0.5–1.4)
CREAT UR-MCNC: 168.2 MG/DL (ref 15–325)
DIFFERENTIAL METHOD: ABNORMAL
EOSINOPHIL # BLD AUTO: 0.1 K/UL (ref 0–0.5)
EOSINOPHIL NFR BLD: 1.1 % (ref 0–8)
ERYTHROCYTE [DISTWIDTH] IN BLOOD BY AUTOMATED COUNT: 14.4 % (ref 11.5–14.5)
EST. GFR  (NO RACE VARIABLE): >60 ML/MIN/1.73 M^2
GLUCOSE SERPL-MCNC: 98 MG/DL (ref 70–110)
HCT VFR BLD AUTO: 34.2 % (ref 37–48.5)
HGB BLD-MCNC: 11 G/DL (ref 12–16)
IMM GRANULOCYTES # BLD AUTO: 0.05 K/UL (ref 0–0.04)
IMM GRANULOCYTES NFR BLD AUTO: 0.5 % (ref 0–0.5)
LYMPHOCYTES # BLD AUTO: 2.2 K/UL (ref 1–4.8)
LYMPHOCYTES NFR BLD: 19.8 % (ref 18–48)
MCH RBC QN AUTO: 25.7 PG (ref 27–31)
MCHC RBC AUTO-ENTMCNC: 32.2 G/DL (ref 32–36)
MCV RBC AUTO: 80 FL (ref 82–98)
MONOCYTES # BLD AUTO: 0.4 K/UL (ref 0.3–1)
MONOCYTES NFR BLD: 3.6 % (ref 4–15)
NEUTROPHILS # BLD AUTO: 8.2 K/UL (ref 1.8–7.7)
NEUTROPHILS NFR BLD: 74.7 % (ref 38–73)
NRBC BLD-RTO: 0 /100 WBC
PLATELET # BLD AUTO: 256 K/UL (ref 150–450)
PMV BLD AUTO: 11.8 FL (ref 9.2–12.9)
POTASSIUM SERPL-SCNC: 4 MMOL/L (ref 3.5–5.1)
PROT SERPL-MCNC: 6.3 G/DL (ref 6–8.4)
PROT UR-MCNC: 20 MG/DL (ref 0–15)
PROT/CREAT UR: 0.12 MG/G{CREAT} (ref 0–0.2)
RBC # BLD AUTO: 4.28 M/UL (ref 4–5.4)
SODIUM SERPL-SCNC: 139 MMOL/L (ref 136–145)
SPECIMEN OUTDATE: NORMAL
WBC # BLD AUTO: 10.97 K/UL (ref 3.9–12.7)

## 2023-04-04 PROCEDURE — 11000001 HC ACUTE MED/SURG PRIVATE ROOM

## 2023-04-04 PROCEDURE — 99214 PR OFFICE/OUTPT VISIT, EST, LEVL IV, 30-39 MIN: ICD-10-PCS | Mod: TH,S$PBB,, | Performed by: MIDWIFE

## 2023-04-04 PROCEDURE — 80053 COMPREHEN METABOLIC PANEL: CPT | Performed by: ADVANCED PRACTICE MIDWIFE

## 2023-04-04 PROCEDURE — 25000003 PHARM REV CODE 250: Performed by: ADVANCED PRACTICE MIDWIFE

## 2023-04-04 PROCEDURE — 72100002 HC LABOR CARE, 1ST 8 HOURS

## 2023-04-04 PROCEDURE — 99999 PR PBB SHADOW E&M-EST. PATIENT-LVL II: CPT | Mod: PBBFAC,,, | Performed by: MIDWIFE

## 2023-04-04 PROCEDURE — 84156 ASSAY OF PROTEIN URINE: CPT | Performed by: ADVANCED PRACTICE MIDWIFE

## 2023-04-04 PROCEDURE — 85025 COMPLETE CBC W/AUTO DIFF WBC: CPT | Performed by: ADVANCED PRACTICE MIDWIFE

## 2023-04-04 PROCEDURE — 99212 OFFICE O/P EST SF 10 MIN: CPT | Mod: PBBFAC,TH | Performed by: MIDWIFE

## 2023-04-04 PROCEDURE — 99999 PR PBB SHADOW E&M-EST. PATIENT-LVL II: ICD-10-PCS | Mod: PBBFAC,,, | Performed by: MIDWIFE

## 2023-04-04 PROCEDURE — 86900 BLOOD TYPING SEROLOGIC ABO: CPT | Performed by: ADVANCED PRACTICE MIDWIFE

## 2023-04-04 PROCEDURE — 63600175 PHARM REV CODE 636 W HCPCS: Performed by: ADVANCED PRACTICE MIDWIFE

## 2023-04-04 PROCEDURE — 76819 FETAL BIOPHYS PROFIL W/O NST: CPT | Mod: PBBFAC | Performed by: OBSTETRICS & GYNECOLOGY

## 2023-04-04 PROCEDURE — 51702 INSERT TEMP BLADDER CATH: CPT

## 2023-04-04 PROCEDURE — 99214 OFFICE O/P EST MOD 30 MIN: CPT | Mod: TH,S$PBB,, | Performed by: MIDWIFE

## 2023-04-04 PROCEDURE — 72100003 HC LABOR CARE, EA. ADDL. 8 HRS

## 2023-04-04 PROCEDURE — 76819 US OB/GYN PROCEDURE (VIEWPOINT): ICD-10-PCS | Mod: 26,S$PBB,, | Performed by: OBSTETRICS & GYNECOLOGY

## 2023-04-04 RX ORDER — SODIUM CHLORIDE, SODIUM LACTATE, POTASSIUM CHLORIDE, CALCIUM CHLORIDE 600; 310; 30; 20 MG/100ML; MG/100ML; MG/100ML; MG/100ML
INJECTION, SOLUTION INTRAVENOUS CONTINUOUS
Status: DISCONTINUED | OUTPATIENT
Start: 2023-04-04 | End: 2023-04-07

## 2023-04-04 RX ORDER — TRANEXAMIC ACID 10 MG/ML
1000 INJECTION, SOLUTION INTRAVENOUS ONCE AS NEEDED
Status: DISCONTINUED | OUTPATIENT
Start: 2023-04-04 | End: 2023-04-07

## 2023-04-04 RX ORDER — METHYLERGONOVINE MALEATE 0.2 MG/ML
200 INJECTION INTRAVENOUS
Status: DISCONTINUED | OUTPATIENT
Start: 2023-04-04 | End: 2023-04-07

## 2023-04-04 RX ORDER — DIPHENOXYLATE HYDROCHLORIDE AND ATROPINE SULFATE 2.5; .025 MG/1; MG/1
1 TABLET ORAL 4 TIMES DAILY PRN
Status: DISCONTINUED | OUTPATIENT
Start: 2023-04-04 | End: 2023-04-07

## 2023-04-04 RX ORDER — MISOPROSTOL 100 MCG
50 TABLET ORAL EVERY 4 HOURS PRN
Status: DISCONTINUED | OUTPATIENT
Start: 2023-04-04 | End: 2023-04-06

## 2023-04-04 RX ORDER — OXYTOCIN/RINGER'S LACTATE 30/500 ML
334 PLASTIC BAG, INJECTION (ML) INTRAVENOUS ONCE
Status: COMPLETED | OUTPATIENT
Start: 2023-04-04 | End: 2023-04-07

## 2023-04-04 RX ORDER — LABETALOL HYDROCHLORIDE 5 MG/ML
80 INJECTION, SOLUTION INTRAVENOUS ONCE AS NEEDED
Status: DISCONTINUED | OUTPATIENT
Start: 2023-04-04 | End: 2023-04-07 | Stop reason: SDUPTHER

## 2023-04-04 RX ORDER — CALCIUM CARBONATE 200(500)MG
500 TABLET,CHEWABLE ORAL 3 TIMES DAILY PRN
Status: DISCONTINUED | OUTPATIENT
Start: 2023-04-04 | End: 2023-04-07

## 2023-04-04 RX ORDER — LIDOCAINE HYDROCHLORIDE 10 MG/ML
10 INJECTION, SOLUTION EPIDURAL; INFILTRATION; INTRACAUDAL; PERINEURAL ONCE AS NEEDED
Status: DISCONTINUED | OUTPATIENT
Start: 2023-04-04 | End: 2023-04-07

## 2023-04-04 RX ORDER — CALCIUM GLUCONATE 98 MG/ML
1 INJECTION, SOLUTION INTRAVENOUS
Status: DISCONTINUED | OUTPATIENT
Start: 2023-04-04 | End: 2023-04-07

## 2023-04-04 RX ORDER — MISOPROSTOL 200 UG/1
800 TABLET ORAL ONCE AS NEEDED
Status: DISCONTINUED | OUTPATIENT
Start: 2023-04-04 | End: 2023-04-07

## 2023-04-04 RX ORDER — TERBUTALINE SULFATE 1 MG/ML
0.25 INJECTION SUBCUTANEOUS
Status: DISCONTINUED | OUTPATIENT
Start: 2023-04-04 | End: 2023-04-07

## 2023-04-04 RX ORDER — CARBOPROST TROMETHAMINE 250 UG/ML
250 INJECTION, SOLUTION INTRAMUSCULAR
Status: DISCONTINUED | OUTPATIENT
Start: 2023-04-04 | End: 2023-04-07

## 2023-04-04 RX ORDER — MAGNESIUM SULFATE HEPTAHYDRATE 40 MG/ML
2 INJECTION, SOLUTION INTRAVENOUS CONTINUOUS
Status: DISCONTINUED | OUTPATIENT
Start: 2023-04-04 | End: 2023-04-07

## 2023-04-04 RX ORDER — OXYTOCIN/RINGER'S LACTATE 30/500 ML
95 PLASTIC BAG, INJECTION (ML) INTRAVENOUS ONCE AS NEEDED
Status: DISCONTINUED | OUTPATIENT
Start: 2023-04-04 | End: 2023-04-07

## 2023-04-04 RX ORDER — SODIUM CHLORIDE 9 MG/ML
INJECTION, SOLUTION INTRAVENOUS
Status: DISCONTINUED | OUTPATIENT
Start: 2023-04-04 | End: 2023-04-07

## 2023-04-04 RX ORDER — MAGNESIUM SULFATE HEPTAHYDRATE 40 MG/ML
4 INJECTION, SOLUTION INTRAVENOUS ONCE
Status: COMPLETED | OUTPATIENT
Start: 2023-04-04 | End: 2023-04-04

## 2023-04-04 RX ORDER — HYDRALAZINE HYDROCHLORIDE 20 MG/ML
10 INJECTION INTRAMUSCULAR; INTRAVENOUS ONCE AS NEEDED
Status: COMPLETED | OUTPATIENT
Start: 2023-04-04 | End: 2023-04-07

## 2023-04-04 RX ORDER — PROCHLORPERAZINE EDISYLATE 5 MG/ML
5 INJECTION INTRAMUSCULAR; INTRAVENOUS EVERY 6 HOURS PRN
Status: DISCONTINUED | OUTPATIENT
Start: 2023-04-04 | End: 2023-04-07

## 2023-04-04 RX ORDER — LABETALOL HYDROCHLORIDE 5 MG/ML
40 INJECTION, SOLUTION INTRAVENOUS ONCE AS NEEDED
Status: COMPLETED | OUTPATIENT
Start: 2023-04-04 | End: 2023-04-04

## 2023-04-04 RX ORDER — OXYTOCIN 10 [USP'U]/ML
10 INJECTION, SOLUTION INTRAMUSCULAR; INTRAVENOUS ONCE AS NEEDED
Status: DISCONTINUED | OUTPATIENT
Start: 2023-04-04 | End: 2023-04-07

## 2023-04-04 RX ORDER — SIMETHICONE 80 MG
1 TABLET,CHEWABLE ORAL 4 TIMES DAILY PRN
Status: DISCONTINUED | OUTPATIENT
Start: 2023-04-04 | End: 2023-04-07

## 2023-04-04 RX ORDER — MISOPROSTOL 200 UG/1
800 TABLET ORAL
Status: DISCONTINUED | OUTPATIENT
Start: 2023-04-04 | End: 2023-04-07

## 2023-04-04 RX ORDER — ONDANSETRON 8 MG/1
8 TABLET, ORALLY DISINTEGRATING ORAL EVERY 8 HOURS PRN
Status: DISCONTINUED | OUTPATIENT
Start: 2023-04-04 | End: 2023-04-07

## 2023-04-04 RX ORDER — LABETALOL HYDROCHLORIDE 5 MG/ML
20 INJECTION, SOLUTION INTRAVENOUS ONCE AS NEEDED
Status: COMPLETED | OUTPATIENT
Start: 2023-04-04 | End: 2023-04-04

## 2023-04-04 RX ORDER — BUTALBITAL, ACETAMINOPHEN AND CAFFEINE 50; 325; 40 MG/1; MG/1; MG/1
1 TABLET ORAL EVERY 4 HOURS PRN
Status: DISCONTINUED | OUTPATIENT
Start: 2023-04-04 | End: 2023-04-07

## 2023-04-04 RX ORDER — OXYTOCIN/RINGER'S LACTATE 30/500 ML
95 PLASTIC BAG, INJECTION (ML) INTRAVENOUS ONCE
Status: DISCONTINUED | OUTPATIENT
Start: 2023-04-04 | End: 2023-04-07

## 2023-04-04 RX ORDER — OXYTOCIN/RINGER'S LACTATE 30/500 ML
334 PLASTIC BAG, INJECTION (ML) INTRAVENOUS ONCE AS NEEDED
Status: DISCONTINUED | OUTPATIENT
Start: 2023-04-04 | End: 2023-04-07

## 2023-04-04 RX ADMIN — MISOPROSTOL 50 MCG: 100 TABLET ORAL at 07:04

## 2023-04-04 RX ADMIN — MAGNESIUM SULFATE HEPTAHYDRATE 4 G: 40 INJECTION, SOLUTION INTRAVENOUS at 04:04

## 2023-04-04 RX ADMIN — SODIUM CHLORIDE, POTASSIUM CHLORIDE, SODIUM LACTATE AND CALCIUM CHLORIDE: 600; 310; 30; 20 INJECTION, SOLUTION INTRAVENOUS at 04:04

## 2023-04-04 RX ADMIN — LABETALOL HYDROCHLORIDE 40 MG: 5 INJECTION INTRAVENOUS at 04:04

## 2023-04-04 RX ADMIN — LABETALOL HYDROCHLORIDE 20 MG: 5 INJECTION INTRAVENOUS at 03:04

## 2023-04-04 RX ADMIN — BUTALBITAL, ACETAMINOPHEN, AND CAFFEINE 1 TABLET: 325; 50; 40 TABLET ORAL at 10:04

## 2023-04-04 RX ADMIN — MAGNESIUM SULFATE IN WATER 2 G/HR: 40 INJECTION, SOLUTION INTRAVENOUS at 04:04

## 2023-04-04 NOTE — ASSESSMENT & PLAN NOTE
Admit to L&D  OB hypertension protocol   Cytotec 50mcg po q4hr  Cooks placement  Continuous monitoring of maternal and fetal status  Anticipate progress and vaginal delivery   REYNOLD upon request

## 2023-04-04 NOTE — SUBJECTIVE & OBJECTIVE
Obstetric HPI:  Patient reports None contractions, active fetal movement, No vaginal bleeding , No loss of fluid     This pregnancy has been complicated by   Asthma  Obesity   Elevated blood pressures  Leg swelling  Chlamydia, PUNEET negative  ADHD  Anxiety/depression     OB History    Para Term  AB Living   1 0 0 0 0 0   SAB IAB Ectopic Multiple Live Births   0 0 0 0 0      # Outcome Date GA Lbr David/2nd Weight Sex Delivery Anes PTL Lv   1 Current              Past Medical History:   Diagnosis Date    Asthma     Attention deficit hyperactivity disorder (ADHD) 3/30/2023     Past Surgical History:   Procedure Laterality Date    WISDOM TOOTH EXTRACTION         PTA Medications   Medication Sig    albuterol (PROVENTIL/VENTOLIN HFA) 90 mcg/actuation inhaler Inhale into the lungs.    aspirin (ECOTRIN) 81 MG EC tablet Take 1 tablet (81 mg total) by mouth once. for 1 dose    pantoprazole (PROTONIX) 40 MG tablet Take 1 tablet (40 mg total) by mouth once daily.    prenatal vit103-iron fum-folic () 27 mg iron- 1 mg Tab Take 1 tablet by mouth once daily.    sertraline (ZOLOFT) 50 MG tablet Take 2 tablets (100 mg total) by mouth once daily.       Review of patient's allergies indicates:  No Known Allergies     Family History       Problem Relation (Age of Onset)    Hypertension Father          Tobacco Use    Smoking status: Former     Types: Cigarettes    Smokeless tobacco: Not on file   Substance and Sexual Activity    Alcohol use: Never    Drug use: Never    Sexual activity: Yes     Partners: Male     Review of Systems   Eyes:  Positive for visual disturbance.   Cardiovascular:  Positive for leg swelling.   Gastrointestinal:  Negative for abdominal pain.   Genitourinary:  Negative for vaginal bleeding and vaginal discharge.   Neurological:  Positive for headaches.   All other systems reviewed and are negative.   Objective:     Vital Signs (Most Recent):  Pulse: 93 (23 1605)  BP: (!) 163/96 (23  1605)  SpO2: 97 % (04/04/23 1605)   Vital Signs (24h Range):  Pulse:  [93-96] 93  SpO2:  [97 %-99 %] 97 %  BP: (138-176)/() 163/96        There is no height or weight on file to calculate BMI.    FHT: 140bpm with moderate variability and accelerations, no decelerations, Cat 1 (reassuring)  TOCO:  None    Physical Exam:   Constitutional: She is oriented to person, place, and time. She appears well-developed and well-nourished.    HENT:   Head: Normocephalic.      Cardiovascular:  Normal rate.             Pulmonary/Chest: Effort normal.        Abdominal: Soft. There is no abdominal tenderness.   Obese/Gravid             Musculoskeletal: Normal range of motion and moves all extremeties.       Neurological: She is alert and oriented to person, place, and time. She has normal reflexes.    Skin: Skin is warm and dry.    Psychiatric: She has a normal mood and affect. Her behavior is normal. Judgment and thought content normal.     Cervix: per CNM in clinic  Dilation:  1  Presentation: Vertex     Significant Labs:  Lab Results   Component Value Date    GROUPTRH O POS 01/30/2023    HEPBSAG Negative 11/30/2022    STREPBCULT No Group B Streptococcus isolated 03/22/2023       I have personallly reviewed all pertinent lab results from the last 24 hours.

## 2023-04-04 NOTE — HOSPITAL COURSE
Admit to L&D  OB hypertension protocol   Cytotec 50mcg po q4hr  Cooks placement  Continuous monitoring of maternal and fetal status  Anticipate progress and vaginal delivery   REYNOLD upon request  4/5/23 Cervix Ft/thick/soft, continue with cytotec 50 q 4hr PO.  Labetalol IVP PRN as needed  4/6/23: Doing well this morning. Cervadil removed this am. Cervix 1/thick per RN. Will let patient eat breakfast this am then start with vaginal cytotec 25mcg Q4 hours and plan for CRB placement later today.   4/7/23 @ 0725 hrs AROM, mod amt clear fluid, cervix 5-6/70/-3, vertex, IUPC, continue pitocin per protocol, MVU's 200 or above  4/7/20231216-3869--pitocin stopped ; repetative late decels; remains 5-7 cm dilated, primary c/section; mother and infant transferred to [facility] for routine postpartum care  4/9/23--bp mildly elevated, labetalol increased to 300mg tid ; infant in nicu  4/10/23: POD 3. Infant in NICU but doing well, per mom should be able to discharge soon. Mom doing well and meeting post-op milestones. Anticipate discharge today vs tomorrow.   4/11/23: POD 4. Infant now rooming in. Patient doing well, ready for discharge.

## 2023-04-04 NOTE — H&P
[facility] - [facility]  Obstetrics  History & Physical    Patient Name: Ruth Ann Oropeza  MRN: [id number]  Admission Date: 2023  Primary Care Provider: Primary Doctor No    Subjective:     Principal Problem:Elevated blood pressure affecting pregnancy in third trimester, antepartum    History of Present Illness:  Sent from clinic d/t elevated blood pressures and headache unrelieved by tylenol       Obstetric HPI:  Patient reports None contractions, active fetal movement, No vaginal bleeding , No loss of fluid     This pregnancy has been complicated by   Asthma  Obesity   Elevated blood pressures  Leg swelling  Chlamydia, PUNEET negative  ADHD  Anxiety/depression     OB History    Para Term  AB Living   1 0 0 0 0 0   SAB IAB Ectopic Multiple Live Births   0 0 0 0 0      # Outcome Date GA Lbr David/2nd Weight Sex Delivery Anes PTL Lv   1 Current              Past Medical History:   Diagnosis Date    Asthma     Attention deficit hyperactivity disorder (ADHD) 3/30/2023     Past Surgical History:   Procedure Laterality Date    WISDOM TOOTH EXTRACTION         PTA Medications   Medication Sig    albuterol (PROVENTIL/VENTOLIN HFA) 90 mcg/actuation inhaler Inhale into the lungs.    aspirin (ECOTRIN) 81 MG EC tablet Take 1 tablet (81 mg total) by mouth once. for 1 dose    pantoprazole (PROTONIX) 40 MG tablet Take 1 tablet (40 mg total) by mouth once daily.    prenatal vit103-iron fum-folic () 27 mg iron- 1 mg Tab Take 1 tablet by mouth once daily.    sertraline (ZOLOFT) 50 MG tablet Take 2 tablets (100 mg total) by mouth once daily.       Review of patient's allergies indicates:  No Known Allergies     Family History       Problem Relation (Age of Onset)    Hypertension Father          Tobacco Use    Smoking status: Former     Types: Cigarettes    Smokeless tobacco: Not on file   Substance and Sexual Activity    Alcohol use: Never    Drug use: Never    Sexual activity: Yes     Partners:  Male     Review of Systems   Eyes:  Positive for visual disturbance.   Cardiovascular:  Positive for leg swelling.   Gastrointestinal:  Negative for abdominal pain.   Genitourinary:  Negative for vaginal bleeding and vaginal discharge.   Neurological:  Positive for headaches.   All other systems reviewed and are negative.   Objective:     Vital Signs (Most Recent):  Pulse: 93 (23 1605)  BP: (!) 163/96 (23 1605)  SpO2: 97 % (23 1605)   Vital Signs (24h Range):  Pulse:  [93-96] 93  SpO2:  [97 %-99 %] 97 %  BP: (138-176)/() 163/96        There is no height or weight on file to calculate BMI.    FHT: 140bpm with moderate variability and accelerations, no decelerations, Cat 1 (reassuring)  TOCO:  None    Physical Exam:   Constitutional: She is oriented to person, place, and time. She appears well-developed and well-nourished.    HENT:   Head: Normocephalic.      Cardiovascular:  Normal rate.             Pulmonary/Chest: Effort normal.        Abdominal: Soft. There is no abdominal tenderness.   Obese/Gravid             Musculoskeletal: Normal range of motion and moves all extremeties.       Neurological: She is alert and oriented to person, place, and time. She has normal reflexes.    Skin: Skin is warm and dry.    Psychiatric: She has a normal mood and affect. Her behavior is normal. Judgment and thought content normal.     Cervix: per CNM in clinic  Dilation:  1  Presentation: Vertex     Significant Labs:  Lab Results   Component Value Date    GROUPTRH O POS 2023    HEPBSAG Negative 2022    STREPBCULT No Group B Streptococcus isolated 2023       I have personallly reviewed all pertinent lab results from the last 24 hours.    Assessment/Plan:     22 y.o. female  at 37w6d for:    * Elevated blood pressure affecting pregnancy in third trimester, antepartum  Admit to L&D  OB hypertension protocol   Cytotec 50mcg po q4hr  Cooks placement  Continuous monitoring of maternal and  fetal status  Anticipate progress and vaginal delivery   REYNOLD upon request    Obesity affecting pregnancy in third trimester  Pre-gravid BMI 51.71, BMI currently 59.83  Lovenox PP    Asthma  Albuterol PRN at home  No issues at present        Etienne Blanchard CNM  Obstetrics  [facility]

## 2023-04-05 ENCOUNTER — ANESTHESIA (OUTPATIENT)
Dept: OBSTETRICS AND GYNECOLOGY | Facility: HOSPITAL | Age: 23
End: 2023-04-05
Payer: MEDICAID

## 2023-04-05 ENCOUNTER — ANESTHESIA EVENT (OUTPATIENT)
Dept: OBSTETRICS AND GYNECOLOGY | Facility: HOSPITAL | Age: 23
End: 2023-04-05
Payer: MEDICAID

## 2023-04-05 PROCEDURE — 63600175 PHARM REV CODE 636 W HCPCS: Performed by: ADVANCED PRACTICE MIDWIFE

## 2023-04-05 PROCEDURE — 11000001 HC ACUTE MED/SURG PRIVATE ROOM

## 2023-04-05 PROCEDURE — 25000003 PHARM REV CODE 250: Performed by: ADVANCED PRACTICE MIDWIFE

## 2023-04-05 PROCEDURE — 72100003 HC LABOR CARE, EA. ADDL. 8 HRS

## 2023-04-05 RX ORDER — LABETALOL HYDROCHLORIDE 5 MG/ML
40 INJECTION, SOLUTION INTRAVENOUS ONCE AS NEEDED
Status: DISCONTINUED | OUTPATIENT
Start: 2023-04-05 | End: 2023-04-07 | Stop reason: SDUPTHER

## 2023-04-05 RX ORDER — LABETALOL HYDROCHLORIDE 5 MG/ML
20 INJECTION, SOLUTION INTRAVENOUS ONCE AS NEEDED
Status: COMPLETED | OUTPATIENT
Start: 2023-04-05 | End: 2023-04-05

## 2023-04-05 RX ORDER — LABETALOL HYDROCHLORIDE 5 MG/ML
80 INJECTION, SOLUTION INTRAVENOUS ONCE AS NEEDED
Status: DISCONTINUED | OUTPATIENT
Start: 2023-04-05 | End: 2023-04-07 | Stop reason: SDUPTHER

## 2023-04-05 RX ORDER — HYDRALAZINE HYDROCHLORIDE 20 MG/ML
10 INJECTION INTRAMUSCULAR; INTRAVENOUS ONCE AS NEEDED
Status: DISCONTINUED | OUTPATIENT
Start: 2023-04-05 | End: 2023-04-07 | Stop reason: SDUPTHER

## 2023-04-05 RX ORDER — MORPHINE SULFATE 10 MG/ML
5 INJECTION INTRAMUSCULAR; INTRAVENOUS; SUBCUTANEOUS ONCE AS NEEDED
Status: DISCONTINUED | OUTPATIENT
Start: 2023-04-05 | End: 2023-04-07

## 2023-04-05 RX ADMIN — MAGNESIUM SULFATE IN WATER 2 G/HR: 40 INJECTION, SOLUTION INTRAVENOUS at 11:04

## 2023-04-05 RX ADMIN — MISOPROSTOL 50 MCG: 100 TABLET ORAL at 11:04

## 2023-04-05 RX ADMIN — DINOPROSTONE 10 MG: 10 INSERT VAGINAL at 05:04

## 2023-04-05 RX ADMIN — ONDANSETRON 8 MG: 8 TABLET, ORALLY DISINTEGRATING ORAL at 12:04

## 2023-04-05 RX ADMIN — MISOPROSTOL 50 MCG: 100 TABLET ORAL at 02:04

## 2023-04-05 RX ADMIN — LABETALOL HYDROCHLORIDE 20 MG: 5 INJECTION INTRAVENOUS at 08:04

## 2023-04-05 RX ADMIN — BUTALBITAL, ACETAMINOPHEN, AND CAFFEINE 1 TABLET: 325; 50; 40 TABLET ORAL at 10:04

## 2023-04-05 RX ADMIN — MISOPROSTOL 50 MCG: 100 TABLET ORAL at 06:04

## 2023-04-05 NOTE — PROGRESS NOTES
"37 weeks gestation of pregnancy    Doing OK, reports "pounding" headache today despite taking 1,000mg of Tylenol and resting before visit. Reports an increase in edema of legs, hands, and face. Reports seeing "black spots," but it unsure if it is related to poor vision (waiting on new eyeglass prescription to come in). No RUQ pain, but does report "sharp" epigastric pain and pain under her left breast. LE reflexes +2.  Good fetal movement  Has been having some BH and increase in vaginal discharge. No regular CTX, VB, LOF.  53lb 8.3 oz TWG  VE per patient request. FT/th and posterior.  Kick counts, labor, and pre-e precautions reviewed  Patient will go to L&D right now for serial BP's and PIH work-up. Report given to Faye HAWTHORNE. Patient aware delivery may be recommended if determined to have GHTN or preeclampsia.     Elevated blood pressure affecting pregnancy, antepartum    Pregnancy headache in third trimester    Obesity affecting pregnancy in third trimester    Pre-gravid BMI 51.71  BPP today: VTX, posterior placenta, MVP 5.2cm, BPP 8/8    Assistance needed with transportation    Patient uses Medicaid transportation. Assisted in calling transportation services to bring patient and partner to hospital now.   "

## 2023-04-05 NOTE — PROGRESS NOTES
[facility] - [facility]  Obstetrics  Labor Progress Note    Patient Name: Ruth Ann Oropeza  MRN: [id number]  Admission Date: 2023  Hospital Length of Stay: 1 days  Attending Physician: Estephanie Cervantes MD  Primary Care Provider: Primary Doctor No    Subjective:     Principal Problem:Gestational hypertension without significant proteinuria during pregnancy in third trimester, antepartum    Hospital Course:  Admit to L&D  OB hypertension protocol   Cytotec 50mcg po q4hr  Cooks placement  Continuous monitoring of maternal and fetal status  Anticipate progress and vaginal delivery   REYNOLD upon request  23 Cervix Ft/thick/soft, continue with cytotec 50 q 4hr PO.  Labetalol IVP PRN as needed      Interval History:  Ruth Ann is a 22 y.o.  at 38w0d. She is doing well.     Objective:     Vital Signs (Most Recent):  Temp: 98.8 °F (37.1 °C) (23 1700)  Pulse: 87 (23 0904)  Resp: 16 (23 1700)  BP: (!) 140/91 (23 0904)  SpO2: 98 % (23 0904)   Vital Signs (24h Range):  Temp:  [98.8 °F (37.1 °C)] 98.8 °F (37.1 °C)  Pulse:  [] 87  Resp:  [16] 16  SpO2:  [93 %-100 %] 98 %  BP: (120-176)/() 140/91     Weight: (!) 178.5 kg (393 lb 8.3 oz)  Body mass index is 59.83 kg/m².    FHT: Cat 1 (reassuring)  TOCO:  Occasional    Cervical Exam:  FT/thick/soft     Significant Labs:  Lab Results   Component Value Date    GROUPTRH O POS 2023    HEPBSAG Negative 2022    STREPBCULT No Group B Streptococcus isolated 2023       I have personallly reviewed all pertinent lab results from the last 24 hours.    Physical Exam:   Constitutional: She is oriented to person, place, and time. She appears well-developed and well-nourished.        Pulmonary/Chest: Effort normal.        Abdominal: Soft.     Genitourinary:    Vagina and uterus normal.             Musculoskeletal: Normal range of motion and moves all extremeties.       Neurological: She is alert and oriented to person, place, and  time.    Skin: Skin is warm and dry.    Psychiatric: She has a normal mood and affect. Her behavior is normal. Judgment and thought content normal.     Review of Systems   Constitutional:         Voices no complaints and appears in NAD     Assessment/Plan:     22 y.o. female  at 38w0d for:    * Gestational hypertension without significant proteinuria during pregnancy in third trimester, antepartum  Admit to L&D  OB hypertension protocol   Cytotec 50mcg po q4hr  Cooks placement  Continuous monitoring of maternal and fetal status  Anticipate progress and vaginal delivery   REYNOLD upon request    Obesity affecting pregnancy in third trimester  Pre-gravid BMI 51.71, BMI currently 59.83  Lovenox PP    Asthma  Albuterol PRN at home  No issues at present          Alejandra Montana CNM  Obstetrics  [facility]

## 2023-04-05 NOTE — ANESTHESIA PREPROCEDURE EVALUATION
04/05/2023  Ruth Ann Oropeza is a 22 y.o., female.      Pre-op Assessment    I have reviewed the Patient Summary Reports.     I have reviewed the Nursing Notes.    I have reviewed the Medications.     Review of Systems  Anesthesia Hx:  No previous Anesthesia    Social:  Former Smoker    Hematology/Oncology:  Hematology Normal   Oncology Normal     EENT/Dental:EENT/Dental Normal   Cardiovascular:   Exercise tolerance: good Hypertension NYHA Classification I    Pulmonary:   Asthma    Renal/:  Renal/ Normal     Hepatic/GI:  Hepatic/GI Normal    Musculoskeletal:  Musculoskeletal Normal    OB/GYN/PEDS:  Issues with Current Pregnancy  are Hypertensive Disease , Pre-eclampsia    Neurological:  Neurology Normal    Endocrine:  Endocrine Normal  Morbid Obesity / BMI > 40  Dermatological:  Skin Normal    Psych:   Psychiatric History adhd         Physical Exam  General: Well nourished and Cooperative    Airway:  Mallampati: II   Mouth Opening: Normal  Tongue: Normal  Neck ROM: Normal ROM    Dental:  Intact    Chest/Lungs:  Clear to auscultation, Normal Respiratory Rate    Heart:  Rate: Normal  Rhythm: Regular Rhythm        Anesthesia Plan  Type of Anesthesia, risks & benefits discussed:    Anesthesia Type: Epidural  Intra-op Monitoring Plan: Standard ASA Monitors  Post Op Pain Control Plan: multimodal analgesia  Informed Consent: Informed consent signed with the Patient and all parties understand the risks and agree with anesthesia plan.  All questions answered. Patient consented to blood products? Yes  ASA Score: 3  Day of Surgery Review of History & Physical: I have interviewed and examined the patient. I have reviewed the patient's H&P dated: There are no significant changes.     Ready For Surgery From Anesthesia Perspective.     .

## 2023-04-05 NOTE — PLAN OF CARE
Induction continues. Cervadil placed this evening. Magnesium/novak/scds continue. X1 dose of labatelol given during shift. See flowsheets.

## 2023-04-05 NOTE — PROGRESS NOTES
S: Resting without complaints  O:  VS reviewed, afebrile   Vitals:    04/05/23 1300 04/05/23 1400 04/05/23 1500 04/05/23 1600   BP: 133/69 133/79 (!) 153/100 119/63   Pulse: 75 79 80 96   Resp:       Temp:       TempSrc:       SpO2: 97% 97% 100% 96%   Weight:       Height:           FHTs:  CAT 1 reassuring with moderate variability  UC: Irregular  SVE: remains 1 cm    A: IUP @ 38w0d ;     Patient Active Problem List   Diagnosis    BMI 50.0-59.9, adult    Asthma    Obesity affecting pregnancy in third trimester    Chlamydia infection affecting pregnancy in third trimester    Leg swelling in pregnancy in third trimester    Attention deficit hyperactivity disorder (ADHD)    Anxiety and depression    Gestational hypertension without significant proteinuria during pregnancy in third trimester, antepartum       P: Feed patient and will insert cervidil this pm  Continue close monitoring of maternal/fetal status

## 2023-04-05 NOTE — SUBJECTIVE & OBJECTIVE
Interval History:  Ruth Ann is a 22 y.o.  at 38w0d. She is doing well.     Objective:     Vital Signs (Most Recent):  Temp: 98.8 °F (37.1 °C) (23 1700)  Pulse: 87 (23 0904)  Resp: 16 (23 1700)  BP: (!) 140/91 (23 0904)  SpO2: 98 % (23 09)   Vital Signs (24h Range):  Temp:  [98.8 °F (37.1 °C)] 98.8 °F (37.1 °C)  Pulse:  [] 87  Resp:  [16] 16  SpO2:  [93 %-100 %] 98 %  BP: (120-176)/() 140/91     Weight: (!) 178.5 kg (393 lb 8.3 oz)  Body mass index is 59.83 kg/m².    FHT: Cat 1 (reassuring)  TOCO:  Occasional    Cervical Exam:  FT/thick/soft     Significant Labs:  Lab Results   Component Value Date    GROUPTRH O POS 2023    HEPBSAG Negative 2022    STREPBCULT No Group B Streptococcus isolated 2023       I have personallly reviewed all pertinent lab results from the last 24 hours.    Physical Exam:   Constitutional: She is oriented to person, place, and time. She appears well-developed and well-nourished.        Pulmonary/Chest: Effort normal.        Abdominal: Soft.     Genitourinary:    Vagina and uterus normal.             Musculoskeletal: Normal range of motion and moves all extremeties.       Neurological: She is alert and oriented to person, place, and time.    Skin: Skin is warm and dry.    Psychiatric: She has a normal mood and affect. Her behavior is normal. Judgment and thought content normal.     Review of Systems   Constitutional:         Voices no complaints and appears in NAD

## 2023-04-06 PROCEDURE — 63600175 PHARM REV CODE 636 W HCPCS: Performed by: ADVANCED PRACTICE MIDWIFE

## 2023-04-06 PROCEDURE — 63600175 PHARM REV CODE 636 W HCPCS: Performed by: NURSE ANESTHETIST, CERTIFIED REGISTERED

## 2023-04-06 PROCEDURE — 72100003 HC LABOR CARE, EA. ADDL. 8 HRS

## 2023-04-06 PROCEDURE — 25000003 PHARM REV CODE 250: Performed by: ADVANCED PRACTICE MIDWIFE

## 2023-04-06 PROCEDURE — 27200710 HC EPIDURAL INFUSION PUMP SET: Performed by: STUDENT IN AN ORGANIZED HEALTH CARE EDUCATION/TRAINING PROGRAM

## 2023-04-06 PROCEDURE — 11000001 HC ACUTE MED/SURG PRIVATE ROOM

## 2023-04-06 PROCEDURE — 59200 INSERT CERVICAL DILATOR: CPT

## 2023-04-06 PROCEDURE — 27800516 HC TRAY, EPIDURAL COMBO: Performed by: STUDENT IN AN ORGANIZED HEALTH CARE EDUCATION/TRAINING PROGRAM

## 2023-04-06 PROCEDURE — 62326 NJX INTERLAMINAR LMBR/SAC: CPT | Performed by: NURSE ANESTHETIST, CERTIFIED REGISTERED

## 2023-04-06 RX ORDER — ROPIVACAINE HYDROCHLORIDE 2 MG/ML
INJECTION, SOLUTION EPIDURAL; INFILTRATION CONTINUOUS PRN
Status: DISCONTINUED | OUTPATIENT
Start: 2023-04-06 | End: 2023-04-07

## 2023-04-06 RX ORDER — OXYTOCIN/RINGER'S LACTATE 30/500 ML
0-36 PLASTIC BAG, INJECTION (ML) INTRAVENOUS CONTINUOUS
Status: DISCONTINUED | OUTPATIENT
Start: 2023-04-06 | End: 2023-04-07

## 2023-04-06 RX ORDER — LABETALOL HYDROCHLORIDE 5 MG/ML
20 INJECTION, SOLUTION INTRAVENOUS ONCE
Status: COMPLETED | OUTPATIENT
Start: 2023-04-06 | End: 2023-04-06

## 2023-04-06 RX ORDER — SERTRALINE HYDROCHLORIDE 50 MG/1
50 TABLET, FILM COATED ORAL DAILY
Status: DISCONTINUED | OUTPATIENT
Start: 2023-04-06 | End: 2023-04-07

## 2023-04-06 RX ORDER — ROPIVACAINE HYDROCHLORIDE 2 MG/ML
INJECTION, SOLUTION EPIDURAL; INFILTRATION
Status: COMPLETED | OUTPATIENT
Start: 2023-04-06 | End: 2023-04-06

## 2023-04-06 RX ORDER — FAMOTIDINE 10 MG/ML
20 INJECTION INTRAVENOUS ONCE
Status: DISCONTINUED | OUTPATIENT
Start: 2023-04-07 | End: 2023-04-07

## 2023-04-06 RX ORDER — MISOPROSTOL 100 MCG
25 TABLET ORAL EVERY 4 HOURS
Status: DISCONTINUED | OUTPATIENT
Start: 2023-04-06 | End: 2023-04-06

## 2023-04-06 RX ORDER — SODIUM CITRATE AND CITRIC ACID MONOHYDRATE 334; 500 MG/5ML; MG/5ML
30 SOLUTION ORAL ONCE
Status: DISCONTINUED | OUTPATIENT
Start: 2023-04-07 | End: 2023-04-07

## 2023-04-06 RX ORDER — NALBUPHINE HYDROCHLORIDE 10 MG/ML
10 INJECTION, SOLUTION INTRAMUSCULAR; INTRAVENOUS; SUBCUTANEOUS
Status: DISCONTINUED | OUTPATIENT
Start: 2023-04-06 | End: 2023-04-07

## 2023-04-06 RX ORDER — EPHEDRINE SULFATE 50 MG/ML
10 INJECTION, SOLUTION INTRAVENOUS ONCE AS NEEDED
Status: DISCONTINUED | OUTPATIENT
Start: 2023-04-07 | End: 2023-04-07

## 2023-04-06 RX ADMIN — MISOPROSTOL 25 MCG: 100 TABLET ORAL at 10:04

## 2023-04-06 RX ADMIN — LABETALOL HYDROCHLORIDE 20 MG: 5 INJECTION INTRAVENOUS at 06:04

## 2023-04-06 RX ADMIN — ONDANSETRON 8 MG: 8 TABLET, ORALLY DISINTEGRATING ORAL at 09:04

## 2023-04-06 RX ADMIN — Medication 2 MILLI-UNITS/MIN: at 04:04

## 2023-04-06 RX ADMIN — ROPIVACAINE HYDROCHLORIDE 8 ML: 2 INJECTION, SOLUTION EPIDURAL; INFILTRATION at 05:04

## 2023-04-06 RX ADMIN — NALBUPHINE HYDROCHLORIDE 10 MG: 10 INJECTION, SOLUTION INTRAMUSCULAR; INTRAVENOUS; SUBCUTANEOUS at 03:04

## 2023-04-06 RX ADMIN — MAGNESIUM SULFATE IN WATER 2 G/HR: 40 INJECTION, SOLUTION INTRAVENOUS at 08:04

## 2023-04-06 RX ADMIN — SODIUM CHLORIDE, POTASSIUM CHLORIDE, SODIUM LACTATE AND CALCIUM CHLORIDE: 600; 310; 30; 20 INJECTION, SOLUTION INTRAVENOUS at 08:04

## 2023-04-06 RX ADMIN — ROPIVACAINE HYDROCHLORIDE 14 ML/HR: 2 INJECTION, SOLUTION EPIDURAL; INFILTRATION at 06:04

## 2023-04-06 NOTE — PROGRESS NOTES
[facility] - [facility]  Obstetrics  Labor Progress Note    Patient Name: Ruth Ann Oropeza  MRN: [id number]  Admission Date: 2023  Hospital Length of Stay: 2 days  Attending Physician: Estephanie Cervantes MD  Primary Care Provider: Primary Doctor No    Subjective:     Principal Problem:Gestational hypertension without significant proteinuria during pregnancy in third trimester, antepartum    Hospital Course:  Admit to L&D  OB hypertension protocol   Cytotec 50mcg po q4hr  Cooks placement  Continuous monitoring of maternal and fetal status  Anticipate progress and vaginal delivery   REYNOLD upon request  23 Cervix Ft/thick/soft, continue with cytotec 50 q 4hr PO.  Labetalol IVP PRN as needed  23: Doing well this morning. Cervadil removed this am. Cervix 1/thick per RN. Will let patient eat breakfast this am then start with vaginal cytotec 25mcg Q4 hours and plan for CRB placement later today.       Interval History:  Ruth Ann is a 22 y.o.  at 38w1d. She is doing well.     Objective:     Vital Signs (Most Recent):  Temp: 98 °F (36.7 °C) (23 1900)  Pulse: 83 (23 0500)  Resp: 16 (23 1700)  BP: (!) 150/101 (23 0500)  SpO2: 96 % (23 0500)   Vital Signs (24h Range):  Temp:  [98 °F (36.7 °C)] 98 °F (36.7 °C)  Pulse:  [72-96] 83  SpO2:  [95 %-100 %] 96 %  BP: (118-172)/() 150/101     Weight: (!) 178.5 kg (393 lb 8.3 oz)  Body mass index is 59.83 kg/m².    FHT: 125 Cat 1 (reassuring)  TOCO:  Q irritability minutes    Cervical Exam:  Dilation:  1  Per RN      Significant Labs:  Lab Results   Component Value Date    GROUPTRH O POS 2023    HEPBSAG Negative 2022    STREPBCULT No Group B Streptococcus isolated 2023       I have personallly reviewed all pertinent lab results from the last 24 hours.    Physical Exam:   Constitutional: She is oriented to person, place, and time. She appears well-developed and well-nourished.    HENT:   Head: Normocephalic.       Cardiovascular:  Normal rate and regular rhythm.             Pulmonary/Chest: Effort normal.        Abdominal: Soft.     Genitourinary:    Vagina and uterus normal.             Musculoskeletal: Normal range of motion and moves all extremeties.       Neurological: She is alert and oriented to person, place, and time. She has normal reflexes.    Skin: Skin is warm and dry.      Review of Systems    Assessment/Plan:     22 y.o. female  at 38w1d for:    * Gestational hypertension without significant proteinuria during pregnancy in third trimester, antepartum  Admit to L&D  OB hypertension protocol   Cytotec 50mcg po q4hr  Cooks placement  Continuous monitoring of maternal and fetal status  Anticipate progress and vaginal delivery   REYNOLD upon request    Obesity affecting pregnancy in third trimester  Pre-gravid BMI 51.71, BMI currently 59.83  Lovenox PP    Asthma  Albuterol PRN at home  No issues at present          Brianne Montilla, KRISTEL  Obstetrics  [facility]

## 2023-04-06 NOTE — PROGRESS NOTES
S: Resting comfortable pain medication administration  O:  VS reviewed, afebrile   Vitals:    04/06/23 1150 04/06/23 1200 04/06/23 1300 04/06/23 1551   BP: (!) 145/86 (!) 143/67 131/65    Pulse:  84 81    Resp:    14   Temp:       TempSrc:       SpO2:  96% 100%    Weight:       Height:           FHTs 130 cat 1, reassuring  UC 5-10  SVE 1/60/-3  CRB placed with ease with 80cc/80cc in balloon     A: IUP @ 38w1d ;     Patient Active Problem List   Diagnosis    BMI 50.0-59.9, adult    Asthma    Obesity affecting pregnancy in third trimester    Chlamydia infection affecting pregnancy in third trimester    Leg swelling in pregnancy in third trimester    Attention deficit hyperactivity disorder (ADHD)    Anxiety and depression    Gestational hypertension without significant proteinuria during pregnancy in third trimester, antepartum       P:   Continue close monitoring of maternal/fetal status  Will plan to start pitocin per protocol

## 2023-04-06 NOTE — PLAN OF CARE
Pt resting well post epidural placement. IV infusing Mag, LR and Pitocin. FOB at bedside. Aquino draining well. POC reviewed with pt. Pt verbalized understanding. Will continue to monitor. Will give report to nightshift.

## 2023-04-06 NOTE — PROGRESS NOTES
S: Resting comfortable   O:  VS reviewed, afebrile   Vitals:    04/06/23 1040 04/06/23 1100 04/06/23 1150 04/06/23 1200   BP: (!) 145/95 (!) 161/98 (!) 145/86 (!) 143/67   Pulse: 85 101  84   Resp:       Temp:       TempSrc:       SpO2: 98% 100%  96%   Weight:       Height:           FHTs 125 cat 1   UC irritability   SVE deferred until next cytotec placement.     A: IUP @ 38w1d ;     Patient Active Problem List   Diagnosis    BMI 50.0-59.9, adult    Asthma    Obesity affecting pregnancy in third trimester    Chlamydia infection affecting pregnancy in third trimester    Leg swelling in pregnancy in third trimester    Attention deficit hyperactivity disorder (ADHD)    Anxiety and depression    Gestational hypertension without significant proteinuria during pregnancy in third trimester, antepartum       P:   Continue close monitoring of maternal/fetal status

## 2023-04-06 NOTE — SUBJECTIVE & OBJECTIVE
Interval History:  Ruth Ann is a 22 y.o.  at 38w1d. She is doing well.     Objective:     Vital Signs (Most Recent):  Temp: 98 °F (36.7 °C) (23 1900)  Pulse: 83 (23 0500)  Resp: 16 (23 1700)  BP: (!) 150/101 (23 0500)  SpO2: 96 % (23 0500)   Vital Signs (24h Range):  Temp:  [98 °F (36.7 °C)] 98 °F (36.7 °C)  Pulse:  [72-96] 83  SpO2:  [95 %-100 %] 96 %  BP: (118-172)/() 150/101     Weight: (!) 178.5 kg (393 lb 8.3 oz)  Body mass index is 59.83 kg/m².    FHT: 125 Cat 1 (reassuring)  TOCO:  Q irritability minutes    Cervical Exam:  Dilation:  1  Per RN      Significant Labs:  Lab Results   Component Value Date    GROUPTRH O POS 2023    HEPBSAG Negative 2022    STREPBCULT No Group B Streptococcus isolated 2023       I have personallly reviewed all pertinent lab results from the last 24 hours.    Physical Exam:   Constitutional: She is oriented to person, place, and time. She appears well-developed and well-nourished.    HENT:   Head: Normocephalic.      Cardiovascular:  Normal rate and regular rhythm.             Pulmonary/Chest: Effort normal.        Abdominal: Soft.     Genitourinary:    Vagina and uterus normal.             Musculoskeletal: Normal range of motion and moves all extremeties.       Neurological: She is alert and oriented to person, place, and time. She has normal reflexes.    Skin: Skin is warm and dry.      Review of Systems

## 2023-04-06 NOTE — ANESTHESIA PROCEDURE NOTES
Epidural    Patient location during procedure: OB   Reason for block: primary anesthetic   Reason for block: labor analgesia requested by patient and obstetrician  Diagnosis: IUP, Labor Pain   Start time: 4/6/2023 5:25 PM  Timeout: 4/6/2023 5:25 PM  End time: 4/6/2023 6:00 PM    Staffing  Performing Provider: Ho Stevenson CRNA  Authorizing Provider: Gomez Oliveros MD        Preanesthetic Checklist  Completed: patient identified, IV checked, site marked, risks and benefits discussed, monitors and equipment checked, pre-op evaluation, timeout performed, anesthesia consent given, hand hygiene performed and patient being monitored  Preparation  Patient position: sitting  Prep: ChloraPrep  Patient monitoring: Pulse Ox and Blood Pressure  Reason for block: primary anesthetic   Epidural  Skin Anesthetic: lidocaine 1%  Skin Wheal: 3 mL  Administration type: continuous  Approach: midline  Interspace: L4-5    Injection technique: ALESSANDRO air  Needle and Epidural Catheter  Needle type: Tuohy   Needle gauge: 17  Needle length: 3.5 inches  Needle insertion depth: 5.5 cm  Catheter type: springwound and multi-orifice  Catheter size: 19 G  Catheter at skin depth: 11 cm  Insertion Attempts: 2  Test dose: 3 mL of lidocaine 1.5% with Epi 1-to-200,000  Additional Documentation: incremental injection, no paresthesia on injection, negative aspiration for heme and CSF, no significant pain on injection, no signs/symptoms of IV or SA injection and no significant complaints from patient  Needle localization: anatomical landmarks  Assessment  Upper dermatomal levels - Left: T10  Right: T10   Dermatomal levels determined by alcohol wipe  Ease of block: difficult  Patient's tolerance of the procedure: comfortable throughout block and no complaints No inadvertent dural puncture with Tuohy.  Dural puncture not performed with spinal needle    Medications:    Medications: ropivacaine (NAROPIN) solution 0.2% - Epidural   8 mL - 4/6/2023  5:50:00 PM

## 2023-04-07 PROBLEM — O36.8390 NON-REASSURING FETAL HEART RATE WITH LATE DECELERATION: Status: ACTIVE | Noted: 2023-04-07

## 2023-04-07 PROBLEM — Z98.891 S/P CESAREAN SECTION: Status: ACTIVE | Noted: 2023-04-07

## 2023-04-07 PROBLEM — D50.8 IRON DEFICIENCY ANEMIA SECONDARY TO INADEQUATE DIETARY IRON INTAKE: Status: ACTIVE | Noted: 2023-04-07

## 2023-04-07 PROCEDURE — 63600175 PHARM REV CODE 636 W HCPCS: Performed by: MIDWIFE

## 2023-04-07 PROCEDURE — 59514 PR CESAREAN DELIVERY ONLY: ICD-10-PCS | Mod: AT,,, | Performed by: OBSTETRICS & GYNECOLOGY

## 2023-04-07 PROCEDURE — 71000033 HC RECOVERY, INTIAL HOUR: Performed by: OBSTETRICS & GYNECOLOGY

## 2023-04-07 PROCEDURE — 25000003 PHARM REV CODE 250: Performed by: NURSE ANESTHETIST, CERTIFIED REGISTERED

## 2023-04-07 PROCEDURE — 37000009 HC ANESTHESIA EA ADD 15 MINS: Performed by: OBSTETRICS & GYNECOLOGY

## 2023-04-07 PROCEDURE — 25000003 PHARM REV CODE 250: Performed by: ADVANCED PRACTICE MIDWIFE

## 2023-04-07 PROCEDURE — 63600175 PHARM REV CODE 636 W HCPCS: Performed by: ADVANCED PRACTICE MIDWIFE

## 2023-04-07 PROCEDURE — 25000003 PHARM REV CODE 250: Performed by: OBSTETRICS & GYNECOLOGY

## 2023-04-07 PROCEDURE — 71000039 HC RECOVERY, EACH ADD'L HOUR: Performed by: OBSTETRICS & GYNECOLOGY

## 2023-04-07 PROCEDURE — 59514 CESAREAN DELIVERY ONLY: CPT | Mod: AT,,, | Performed by: OBSTETRICS & GYNECOLOGY

## 2023-04-07 PROCEDURE — 37000008 HC ANESTHESIA 1ST 15 MINUTES: Performed by: OBSTETRICS & GYNECOLOGY

## 2023-04-07 PROCEDURE — 72100003 HC LABOR CARE, EA. ADDL. 8 HRS

## 2023-04-07 PROCEDURE — 36004725 HC OB OR TIME LEV III - EA ADD 15 MIN: Performed by: OBSTETRICS & GYNECOLOGY

## 2023-04-07 PROCEDURE — 36004724 HC OB OR TIME LEV III - 1ST 15 MIN: Performed by: OBSTETRICS & GYNECOLOGY

## 2023-04-07 PROCEDURE — 63600175 PHARM REV CODE 636 W HCPCS: Performed by: NURSE ANESTHETIST, CERTIFIED REGISTERED

## 2023-04-07 PROCEDURE — 25000003 PHARM REV CODE 250: Performed by: MIDWIFE

## 2023-04-07 PROCEDURE — 11000001 HC ACUTE MED/SURG PRIVATE ROOM

## 2023-04-07 PROCEDURE — 27000181 HC CABLE, IUPC

## 2023-04-07 PROCEDURE — 63600175 PHARM REV CODE 636 W HCPCS: Performed by: OBSTETRICS & GYNECOLOGY

## 2023-04-07 RX ORDER — SIMETHICONE 80 MG
1 TABLET,CHEWABLE ORAL EVERY 6 HOURS PRN
Status: DISCONTINUED | OUTPATIENT
Start: 2023-04-07 | End: 2023-04-11 | Stop reason: HOSPADM

## 2023-04-07 RX ORDER — SERTRALINE HYDROCHLORIDE 50 MG/1
100 TABLET, FILM COATED ORAL NIGHTLY
Status: DISCONTINUED | OUTPATIENT
Start: 2023-04-08 | End: 2023-04-11 | Stop reason: HOSPADM

## 2023-04-07 RX ORDER — LABETALOL HYDROCHLORIDE 5 MG/ML
40 INJECTION, SOLUTION INTRAVENOUS ONCE AS NEEDED
Status: DISCONTINUED | OUTPATIENT
Start: 2023-04-07 | End: 2023-04-07 | Stop reason: SDUPTHER

## 2023-04-07 RX ORDER — LANOLIN ALCOHOL/MO/W.PET/CERES
1 CREAM (GRAM) TOPICAL DAILY
Status: DISCONTINUED | OUTPATIENT
Start: 2023-04-08 | End: 2023-04-11 | Stop reason: HOSPADM

## 2023-04-07 RX ORDER — FENTANYL CITRATE 50 UG/ML
INJECTION, SOLUTION INTRAMUSCULAR; INTRAVENOUS
Status: DISCONTINUED | OUTPATIENT
Start: 2023-04-07 | End: 2023-04-07

## 2023-04-07 RX ORDER — KETOROLAC TROMETHAMINE 30 MG/ML
30 INJECTION, SOLUTION INTRAMUSCULAR; INTRAVENOUS
Status: COMPLETED | OUTPATIENT
Start: 2023-04-07 | End: 2023-04-08

## 2023-04-07 RX ORDER — HYDROCODONE BITARTRATE AND ACETAMINOPHEN 7.5; 325 MG/1; MG/1
1 TABLET ORAL EVERY 4 HOURS PRN
Status: DISCONTINUED | OUTPATIENT
Start: 2023-04-07 | End: 2023-04-11 | Stop reason: HOSPADM

## 2023-04-07 RX ORDER — OXYTOCIN/RINGER'S LACTATE 30/500 ML
95 PLASTIC BAG, INJECTION (ML) INTRAVENOUS ONCE
Status: DISCONTINUED | OUTPATIENT
Start: 2023-04-07 | End: 2023-04-11 | Stop reason: HOSPADM

## 2023-04-07 RX ORDER — OXYTOCIN/RINGER'S LACTATE 30/500 ML
334 PLASTIC BAG, INJECTION (ML) INTRAVENOUS ONCE AS NEEDED
Status: DISCONTINUED | OUTPATIENT
Start: 2023-04-07 | End: 2023-04-11 | Stop reason: HOSPADM

## 2023-04-07 RX ORDER — HYDROCORTISONE 25 MG/G
CREAM TOPICAL 3 TIMES DAILY PRN
Status: DISCONTINUED | OUTPATIENT
Start: 2023-04-07 | End: 2023-04-11 | Stop reason: HOSPADM

## 2023-04-07 RX ORDER — SERTRALINE HYDROCHLORIDE 50 MG/1
50 TABLET, FILM COATED ORAL NIGHTLY
Status: DISCONTINUED | OUTPATIENT
Start: 2023-04-07 | End: 2023-04-07

## 2023-04-07 RX ORDER — FUROSEMIDE 10 MG/ML
20 INJECTION INTRAMUSCULAR; INTRAVENOUS
Status: COMPLETED | OUTPATIENT
Start: 2023-04-08 | End: 2023-04-08

## 2023-04-07 RX ORDER — HYDRALAZINE HYDROCHLORIDE 20 MG/ML
10 INJECTION INTRAMUSCULAR; INTRAVENOUS ONCE AS NEEDED
Status: DISCONTINUED | OUTPATIENT
Start: 2023-04-07 | End: 2023-04-07 | Stop reason: SDUPTHER

## 2023-04-07 RX ORDER — OXYTOCIN/RINGER'S LACTATE 30/500 ML
95 PLASTIC BAG, INJECTION (ML) INTRAVENOUS ONCE AS NEEDED
Status: DISCONTINUED | OUTPATIENT
Start: 2023-04-07 | End: 2023-04-11 | Stop reason: HOSPADM

## 2023-04-07 RX ORDER — LIDOCAINE HCL/EPINEPHRINE/PF 2%-1:200K
VIAL (ML) INJECTION
Status: DISCONTINUED | OUTPATIENT
Start: 2023-04-07 | End: 2023-04-07

## 2023-04-07 RX ORDER — HYDROMORPHONE HYDROCHLORIDE 2 MG/ML
1 INJECTION, SOLUTION INTRAMUSCULAR; INTRAVENOUS; SUBCUTANEOUS EVERY 4 HOURS PRN
Status: DISCONTINUED | OUTPATIENT
Start: 2023-04-07 | End: 2023-04-11 | Stop reason: HOSPADM

## 2023-04-07 RX ORDER — AMOXICILLIN 250 MG
1 CAPSULE ORAL NIGHTLY
Status: DISCONTINUED | OUTPATIENT
Start: 2023-04-07 | End: 2023-04-11 | Stop reason: HOSPADM

## 2023-04-07 RX ORDER — ONDANSETRON 8 MG/1
8 TABLET, ORALLY DISINTEGRATING ORAL EVERY 8 HOURS PRN
Status: DISCONTINUED | OUTPATIENT
Start: 2023-04-07 | End: 2023-04-11 | Stop reason: HOSPADM

## 2023-04-07 RX ORDER — HYDRALAZINE HYDROCHLORIDE 20 MG/ML
10 INJECTION INTRAMUSCULAR; INTRAVENOUS ONCE AS NEEDED
Status: DISCONTINUED | OUTPATIENT
Start: 2023-04-07 | End: 2023-04-11 | Stop reason: HOSPADM

## 2023-04-07 RX ORDER — ENOXAPARIN SODIUM 100 MG/ML
60 INJECTION SUBCUTANEOUS EVERY 12 HOURS
Status: DISCONTINUED | OUTPATIENT
Start: 2023-04-08 | End: 2023-04-08

## 2023-04-07 RX ORDER — FUROSEMIDE 10 MG/ML
INJECTION INTRAMUSCULAR; INTRAVENOUS
Status: DISCONTINUED | OUTPATIENT
Start: 2023-04-07 | End: 2023-04-07

## 2023-04-07 RX ORDER — MORPHINE SULFATE 1 MG/ML
INJECTION, SOLUTION EPIDURAL; INTRATHECAL; INTRAVENOUS
Status: DISCONTINUED | OUTPATIENT
Start: 2023-04-07 | End: 2023-04-07

## 2023-04-07 RX ORDER — SODIUM CITRATE AND CITRIC ACID MONOHYDRATE 334; 500 MG/5ML; MG/5ML
30 SOLUTION ORAL
Status: DISCONTINUED | OUTPATIENT
Start: 2023-04-07 | End: 2023-04-07

## 2023-04-07 RX ORDER — FAMOTIDINE 10 MG/ML
20 INJECTION INTRAVENOUS
Status: DISCONTINUED | OUTPATIENT
Start: 2023-04-07 | End: 2023-04-07

## 2023-04-07 RX ORDER — PRENATAL WITH FERROUS FUM AND FOLIC ACID 3080; 920; 120; 400; 22; 1.84; 3; 20; 10; 1; 12; 200; 27; 25; 2 [IU]/1; [IU]/1; MG/1; [IU]/1; MG/1; MG/1; MG/1; MG/1; MG/1; MG/1; UG/1; MG/1; MG/1; MG/1; MG/1
1 TABLET ORAL DAILY
Status: DISCONTINUED | OUTPATIENT
Start: 2023-04-08 | End: 2023-04-11 | Stop reason: HOSPADM

## 2023-04-07 RX ORDER — SODIUM CHLORIDE 0.9 % (FLUSH) 0.9 %
2 SYRINGE (ML) INJECTION EVERY 6 HOURS PRN
Status: DISCONTINUED | OUTPATIENT
Start: 2023-04-07 | End: 2023-04-11 | Stop reason: HOSPADM

## 2023-04-07 RX ORDER — LABETALOL 200 MG/1
200 TABLET, FILM COATED ORAL EVERY 8 HOURS
Status: DISCONTINUED | OUTPATIENT
Start: 2023-04-07 | End: 2023-04-09

## 2023-04-07 RX ORDER — PROMETHAZINE HYDROCHLORIDE 25 MG/ML
INJECTION, SOLUTION INTRAMUSCULAR; INTRAVENOUS
Status: DISCONTINUED | OUTPATIENT
Start: 2023-04-07 | End: 2023-04-07

## 2023-04-07 RX ORDER — SERTRALINE HYDROCHLORIDE 50 MG/1
50 TABLET, FILM COATED ORAL ONCE
Status: COMPLETED | OUTPATIENT
Start: 2023-04-08 | End: 2023-04-07

## 2023-04-07 RX ORDER — CEFAZOLIN SODIUM 1 G/3ML
INJECTION, POWDER, FOR SOLUTION INTRAMUSCULAR; INTRAVENOUS
Status: DISCONTINUED | OUTPATIENT
Start: 2023-04-07 | End: 2023-04-07

## 2023-04-07 RX ORDER — KETOROLAC TROMETHAMINE 30 MG/ML
INJECTION, SOLUTION INTRAMUSCULAR; INTRAVENOUS
Status: DISCONTINUED | OUTPATIENT
Start: 2023-04-07 | End: 2023-04-07

## 2023-04-07 RX ORDER — HYDRALAZINE HYDROCHLORIDE 20 MG/ML
5 INJECTION INTRAMUSCULAR; INTRAVENOUS ONCE AS NEEDED
Status: DISCONTINUED | OUTPATIENT
Start: 2023-04-07 | End: 2023-04-11 | Stop reason: HOSPADM

## 2023-04-07 RX ORDER — LABETALOL HYDROCHLORIDE 5 MG/ML
20 INJECTION, SOLUTION INTRAVENOUS ONCE AS NEEDED
Status: DISCONTINUED | OUTPATIENT
Start: 2023-04-07 | End: 2023-04-07 | Stop reason: SDUPTHER

## 2023-04-07 RX ORDER — LABETALOL HYDROCHLORIDE 5 MG/ML
20 INJECTION, SOLUTION INTRAVENOUS ONCE AS NEEDED
Status: DISCONTINUED | OUTPATIENT
Start: 2023-04-07 | End: 2023-04-11 | Stop reason: HOSPADM

## 2023-04-07 RX ORDER — HYDROCODONE BITARTRATE AND ACETAMINOPHEN 5; 325 MG/1; MG/1
1 TABLET ORAL EVERY 4 HOURS PRN
Status: DISCONTINUED | OUTPATIENT
Start: 2023-04-07 | End: 2023-04-11 | Stop reason: HOSPADM

## 2023-04-07 RX ORDER — IBUPROFEN 800 MG/1
800 TABLET ORAL EVERY 8 HOURS
Status: DISCONTINUED | OUTPATIENT
Start: 2023-04-08 | End: 2023-04-11 | Stop reason: HOSPADM

## 2023-04-07 RX ORDER — CHLORHEXIDINE GLUCONATE ORAL RINSE 1.2 MG/ML
10 SOLUTION DENTAL 2 TIMES DAILY
Status: DISCONTINUED | OUTPATIENT
Start: 2023-04-07 | End: 2023-04-11 | Stop reason: HOSPADM

## 2023-04-07 RX ORDER — ONDANSETRON 2 MG/ML
INJECTION INTRAMUSCULAR; INTRAVENOUS
Status: DISCONTINUED | OUTPATIENT
Start: 2023-04-07 | End: 2023-04-07

## 2023-04-07 RX ORDER — LABETALOL HYDROCHLORIDE 5 MG/ML
80 INJECTION, SOLUTION INTRAVENOUS ONCE AS NEEDED
Status: DISCONTINUED | OUTPATIENT
Start: 2023-04-07 | End: 2023-04-11 | Stop reason: HOSPADM

## 2023-04-07 RX ORDER — LABETALOL HYDROCHLORIDE 5 MG/ML
40 INJECTION, SOLUTION INTRAVENOUS ONCE AS NEEDED
Status: DISCONTINUED | OUTPATIENT
Start: 2023-04-07 | End: 2023-04-11 | Stop reason: HOSPADM

## 2023-04-07 RX ORDER — OXYTOCIN 10 [USP'U]/ML
10 INJECTION, SOLUTION INTRAMUSCULAR; INTRAVENOUS ONCE AS NEEDED
Status: DISCONTINUED | OUTPATIENT
Start: 2023-04-07 | End: 2023-04-11 | Stop reason: HOSPADM

## 2023-04-07 RX ORDER — SERTRALINE HYDROCHLORIDE 50 MG/1
100 TABLET, FILM COATED ORAL NIGHTLY
Status: DISCONTINUED | OUTPATIENT
Start: 2023-04-07 | End: 2023-04-07

## 2023-04-07 RX ORDER — DOCUSATE SODIUM 100 MG/1
100 CAPSULE, LIQUID FILLED ORAL 2 TIMES DAILY
Status: DISCONTINUED | OUTPATIENT
Start: 2023-04-07 | End: 2023-04-11 | Stop reason: HOSPADM

## 2023-04-07 RX ORDER — DIPHENHYDRAMINE HCL 25 MG
25 CAPSULE ORAL EVERY 4 HOURS PRN
Status: DISCONTINUED | OUTPATIENT
Start: 2023-04-07 | End: 2023-04-11 | Stop reason: HOSPADM

## 2023-04-07 RX ORDER — LABETALOL HYDROCHLORIDE 5 MG/ML
80 INJECTION, SOLUTION INTRAVENOUS ONCE AS NEEDED
Status: DISCONTINUED | OUTPATIENT
Start: 2023-04-07 | End: 2023-04-07 | Stop reason: SDUPTHER

## 2023-04-07 RX ADMIN — SERTRALINE HYDROCHLORIDE 50 MG: 50 TABLET ORAL at 11:04

## 2023-04-07 RX ADMIN — FUROSEMIDE 20 MG: 10 INJECTION, SOLUTION INTRAMUSCULAR; INTRAVENOUS at 02:04

## 2023-04-07 RX ADMIN — AZITHROMYCIN MONOHYDRATE 500 MG: 500 INJECTION, POWDER, LYOPHILIZED, FOR SOLUTION INTRAVENOUS at 01:04

## 2023-04-07 RX ADMIN — SERTRALINE HYDROCHLORIDE 50 MG: 50 TABLET ORAL at 09:04

## 2023-04-07 RX ADMIN — ONDANSETRON 8 MG: 8 TABLET, ORALLY DISINTEGRATING ORAL at 12:04

## 2023-04-07 RX ADMIN — FENTANYL CITRATE 100 MCG: 50 INJECTION, SOLUTION INTRAMUSCULAR; INTRAVENOUS at 01:04

## 2023-04-07 RX ADMIN — ONDANSETRON 8 MG: 2 INJECTION INTRAMUSCULAR; INTRAVENOUS at 02:04

## 2023-04-07 RX ADMIN — Medication 334 ML/HR: at 02:04

## 2023-04-07 RX ADMIN — CARBOPROST TROMETHAMINE 250 MCG: 250 INJECTION, SOLUTION INTRAMUSCULAR at 02:04

## 2023-04-07 RX ADMIN — LIDOCAINE HYDROCHLORIDE AND EPINEPHRINE 5 ML: 20; 5 INJECTION, SOLUTION EPIDURAL; INFILTRATION; INTRACAUDAL; PERINEURAL at 01:04

## 2023-04-07 RX ADMIN — MORPHINE SULFATE 8 MG: 1 INJECTION, SOLUTION EPIDURAL; INTRATHECAL; INTRAVENOUS at 02:04

## 2023-04-07 RX ADMIN — CEFAZOLIN 3 G: 330 INJECTION, POWDER, FOR SOLUTION INTRAMUSCULAR; INTRAVENOUS at 02:04

## 2023-04-07 RX ADMIN — LIDOCAINE HYDROCHLORIDE AND EPINEPHRINE 5 ML: 20; 5 INJECTION, SOLUTION EPIDURAL; INFILTRATION; INTRACAUDAL; PERINEURAL at 02:04

## 2023-04-07 RX ADMIN — LABETALOL HYDROCHLORIDE 200 MG: 200 TABLET, FILM COATED ORAL at 05:04

## 2023-04-07 RX ADMIN — KETOROLAC TROMETHAMINE 30 MG: 30 INJECTION, SOLUTION INTRAMUSCULAR; INTRAVENOUS at 09:04

## 2023-04-07 RX ADMIN — PROMETHAZINE HYDROCHLORIDE 25 MG: 25 INJECTION INTRAMUSCULAR; INTRAVENOUS at 02:04

## 2023-04-07 RX ADMIN — DOCUSATE SODIUM 100 MG: 100 CAPSULE, LIQUID FILLED ORAL at 09:04

## 2023-04-07 RX ADMIN — SENNOSIDES AND DOCUSATE SODIUM 1 TABLET: 50; 8.6 TABLET ORAL at 09:04

## 2023-04-07 RX ADMIN — FENTANYL CITRATE 100 MCG: 50 INJECTION, SOLUTION INTRAMUSCULAR; INTRAVENOUS at 02:04

## 2023-04-07 RX ADMIN — KETOROLAC TROMETHAMINE 30 MG: 30 INJECTION, SOLUTION INTRAMUSCULAR; INTRAVENOUS at 02:04

## 2023-04-07 RX ADMIN — SODIUM CHLORIDE, POTASSIUM CHLORIDE, SODIUM LACTATE AND CALCIUM CHLORIDE: 600; 310; 30; 20 INJECTION, SOLUTION INTRAVENOUS at 01:04

## 2023-04-07 RX ADMIN — MORPHINE SULFATE 2 MG: 1 INJECTION, SOLUTION EPIDURAL; INTRATHECAL; INTRAVENOUS at 02:04

## 2023-04-07 RX ADMIN — DIPHENOXYLATE HYDROCHLORIDE AND ATROPINE SULFATE 1 TABLET: 2.5; .025 TABLET ORAL at 03:04

## 2023-04-07 RX ADMIN — HYDROCODONE BITARTRATE AND ACETAMINOPHEN 1 TABLET: 7.5; 325 TABLET ORAL at 05:04

## 2023-04-07 RX ADMIN — HYDRALAZINE HYDROCHLORIDE 10 MG: 20 INJECTION INTRAMUSCULAR; INTRAVENOUS at 03:04

## 2023-04-07 RX ADMIN — CHLORHEXIDINE GLUCONATE 0.12% ORAL RINSE 10 ML: 1.2 LIQUID ORAL at 09:04

## 2023-04-07 NOTE — L&D DELIVERY NOTE
[facility] - Labor & Delivery   Section   Operative Note    SUMMARY     Date of Procedure: 2023     Procedure: Procedure(s) (LRB):   SECTION (N/A)    Surgeon(s) and Role:     * Estephanie Cervantes MD - Primary    Assisting Surgeon: Kary Yoo    Pre-Operative Diagnosis: Fetal Intolerance of Labor    Post-Operative Diagnosis: Post-Op Diagnosis Codes:     * Pregnant [Z34.90]    Anesthesia: Spinal/Epidural    Technical Procedures Used: primary low transverse  section           Description of the Findings of the Procedure: vertex, not in pelvis, clear fluid, normal adnexa, male,     Significant Surgical Tasks Conducted by the Assistant(s), if Applicable: 1st assist    Complications: No    Blood Loss: * No values recorded between 2023  2:14 PM and 2023  3:26 PM *700     With patient in supine position, the legs are  and Aquino Catheter placed and positioning to supine done.   Abdomen prepped with Chloroprep and 3 minute drying time allowed prior to draping of the abdomen.   Time out taken with OR team members.  Pfannenstiel Incision made through the skin, transverse fascial incision developed, rectus muscles  in the midline and the peritoneum entered.   no adhesions noted.  The lower uterine segment and position of the fetus identified.   Bladder flap taken down through transverse peritoneal incision.    Low Transverse Incision made through well developed lower uterine segment and extended laterally with blunt dissection.   Clear fluid noted.  Infant delivered from vertex presentation.  Cord clamped after one minute and  handed to attending nurse.  Cord blood taken, placenta delivered.  The uterus wasnot exteriorized.  Fahad retractor placed.   The edges of the uterine incision are grasped with Espinosa clamps at the angles and the inferior and superior midline edges of the incision.    Closure with running lock 1 monocryl, starting at each angle, tying in the  "midline.   Observation for bleeding with suture of any bleeding along the hysterotomy line.   With good hemostasis noted, the anterior pelvis is rinsed with sterile saline.   Right and left adnexa with normal anatomy.   marbin retractor removed.   Closure of the abdomen with 2 0 Vicryl running of the peritoneum,    The fascia was closed with 0 vicryl starting at each angle and tying the knot in the midline after locking the first.  Subcutaneous tissue was copiously irrigated and made hemostatic with cautery.   Subcutaneous space closed with 2-0 plain in layers       .  Skin closure with 4 0 monocryl subcuticular.  Wound dressed with aqusel and pressure dressing applied.          Specimens:   Specimen (24h ago, onward)      None            Condition: Good    Disposition: PACU - hemodynamically stable.    Attestation: Good         Delivery Information for José Miguel Oropeza    Birth information:  YOB: 2021   Time of birth: 2:24 PM   Sex: male   Head Delivery Date/Time: 2023  2:24 PM   Delivery type: , Low Transverse   Gestational Age: 38w2d  Unknown    Delivery Providers    Delivering clinician: Estephanie Cervantes MD   Provider Role    Loyda Brower RN Registered Nurse    Christy Campbell, RN Registered Nurse    Romulo Yoo, [facility] Assist    [facility], [facility] Tech    Ho Stevenson, CRNA Nurse Anesthetist              Measurements    Weight: 3310 g  Weight (lbs): 7 lb 4.8 oz  Length: 52.7 cm  Length (in): 20.75"  Head circumference: 36.8 cm  Chest circumference: 31.8 cm  Abdominal girth: 29.8 cm         Apgars    Living status: Living  Apgars:  1 min.:  5 min.:  10 min.:  15 min.:  20 min.:    Skin color:  0  1       Heart rate:  2  2       Reflex irritability:  2  2       Muscle tone:  2  2       Respiratory effort:  2  2       Total:  8  9       Apgars assigned by: RODRIGO CAMPBELL RN         Operative Delivery    Forceps attempted?: No  Vacuum extractor attempted?: No   "       Shoulder Dystocia    Shoulder dystocia present?: No           Presentation    Presentation: Vertex           Interventions/Resuscitation    Method: Bulb Suctioning, Tactile Stimulation, Deep Suctioning       Cord    Vessels: 3 vessels  Complications: None  Delayed Cord Clamping?: Yes  Cord Clamped Date/Time: 2023  2:25 PM  Cord Blood Disposition: Lab  Gases Sent?: No  Stem Cell Collection (by MD): No       Placenta    Placenta delivery date/time: 2023 1425  Placenta removal: Manual removal  Placenta appearance: Intact  Placenta disposition: Discarded           Labor Events:       labor: No     Labor Onset Date/Time: 2023 19:15     Dilation Complete Date/Time:         Start Pushing Date/Time:         Start Pushing Date/Time:       Rupture Date/Time: 23           Rupture type: ARM (Artificial Rupture)           Fluid Amount:         Fluid Color: Clear                 steroids: None     Antibiotics given for GBS: No     Induction: misoprostol;oxytocin;balloon dilation (Aquino);dinoprostone insert;amniotomy     Indications for induction:  Severe Preeclampsia     Augmentation:       Indications for augmentation:       Labor complications: Fetal Intolerance;Failure to Progress in First Stage     Additional complications:          Cervical ripenin2023 7:15 PM      Misoprostol          Delivery:      Episiotomy: None     Indication for Episiotomy:       Perineal Lacerations: None Repaired:      Periurethral Laceration:   Repaired:     Labial Laceration:   Repaired:     Sulcus Laceration:   Repaired:     Vaginal Laceration:   Repaired:     Cervical Laceration:   Repaired:     Repair suture:       Repair # of packets: 7     Last Value - EBL - Nursing (mL):       Sum - EBL - Nursing (mL): 0     Last Value - EBL - Anesthesia (mL):        Calculated QBL (mL):          Vaginal Sweep Performed: No     Surgicount Correct:       Vaginal Packing: No Quantity:       Other  providers:       Anesthesia    Method: Epidural          Details (if applicable):  Trial of Labor Yes    Categorization: Primary    Priority: Urgent   Indications for : Failed induction;Fetal heart rate abnormalities   Incision Type: low transverse     Additional  information:  Forceps:    Vacuum:    Breech:    Observed anomalies    Other (Comments):

## 2023-04-07 NOTE — ANESTHESIA POSTPROCEDURE EVALUATION
Anesthesia Post Evaluation    Patient: Ruth Ann Oropeza    Procedure(s) Performed: Procedure(s) (LRB):   SECTION (N/A)    Final Anesthesia Type: epidural      Patient location during evaluation: labor & delivery  Patient participation: Yes- Able to Participate  Level of consciousness: awake and alert and oriented  Post-procedure vital signs: reviewed and stable  Pain management: adequate  Airway patency: patent  HANSA mitigation strategies: Use of major conduction anesthesia (spinal/epidural) or peripheral nerve block  PONV status at discharge: No PONV  Anesthetic complications: no      Cardiovascular status: hemodynamically stable  Respiratory status: spontaneous ventilation and room air  Hydration status: euvolemic  Follow-up not needed.          Vitals Value Taken Time   /75 23 1349   Temp 36.6 °C (97.8 °F) 23 0900   Pulse 95 23 1353   Resp 16 23 0900   SpO2 95 % 23 1353   Vitals shown include unvalidated device data.      No case tracking events are documented in the log.      Pain/Dennis Score: Pain Rating Prior to Med Admin: 9 (2023  3:51 PM)  Pain Rating Post Med Admin: 0 (2023  4:00 PM)

## 2023-04-07 NOTE — CONSULTS
Pharmacist Renal Dose Adjustment Note    Ruth Ann Oropeza is a 22 y.o. female being treated with the medication Lovenox    Patient Data:    Vital Signs (Most Recent):  Temp: 97.8 °F (36.6 °C) (04/07/23 0900)  Pulse: 82 (04/07/23 1134)  Resp: 16 (04/07/23 0900)  BP: (!) 147/86 (04/07/23 1133)  SpO2: 98 % (04/07/23 1134)   Vital Signs (72h Range):  Temp:  [97.8 °F (36.6 °C)-98.8 °F (37.1 °C)]   Pulse:  []   Resp:  [14-16]   BP: ()/()   SpO2:  [87 %-100 %]      Recent Labs   Lab 04/04/23  1538   CREATININE 0.8     Serum creatinine: 0.8 mg/dL 04/04/23 1538  Estimated creatinine clearance: 194.3 mL/min    Medication:Lovenox 40 mg BID was increased to 60 mg BID per protocol / weight > 120 kg.    Pharmacist's Name: Michael Hill  Pharmacist's Extension: [phone]

## 2023-04-07 NOTE — PROGRESS NOTES
[facility] - Labor & Delivery  Obstetrics  Labor Progress Note    Patient Name: Ruth Ann Oropeza  MRN: [id number]  Admission Date: 2023  Hospital Length of Stay: 3 days  Attending Physician: Estephanie Cervantes MD  Primary Care Provider: Primary Doctor No    Subjective:     Principal Problem:Non-reassuring fetal heart rate with late deceleration    Hospital Course:  Admit to L&D  OB hypertension protocol   Cytotec 50mcg po q4hr  Cooks placement  Continuous monitoring of maternal and fetal status  Anticipate progress and vaginal delivery   REYNOLD upon request  23 Cervix Ft/thick/soft, continue with cytotec 50 q 4hr PO.  Labetalol IVP PRN as needed  23: Doing well this morning. Cervadil removed this am. Cervix 1/thick per RN. Will let patient eat breakfast this am then start with vaginal cytotec 25mcg Q4 hours and plan for CRB placement later today.   23 @ 0725 hrs AROM, mod amt clear fluid, cervix 5-6/70/-3, vertex, IUPC, continue pitocin per protocol, MVU's 200 or above  [id number]--pitocin stopped ; repetative late decels; remains 5-7 cm dilated, primary c/section      No new subjective & objective note has been filed under this hospital service since the last note was generated.    Assessment/Plan:     22 y.o. female  at 38w2d for:    * Non-reassuring fetal heart rate with late deceleration  2023  Pitocin off  Primary c/section recommended  Reviewed risks of c/section, including but not limited to:   infection, bleeding, damage to bowel, bladder, dvt, htn, cva; damage to fetus.  All questions answered  Consent signed and witnessed; OR/anesthesia aware  Ancef/azithromycin preop    Iron deficiency anemia secondary to inadequate dietary iron intake  2023  Continue iron postpartum    Gestational hypertension without significant proteinuria during pregnancy in third trimester, antepartum  Admit to L&D  OB hypertension protocol   Pitocin per protocol  Continuous monitoring of maternal and fetal  status  Anticipate progress and vaginal delivery   IUPC in place  04/07/2023  Continue to follow closely    Leg swelling in pregnancy in third trimester  Lasix post op    Obesity affecting pregnancy in third trimester  Pre-gravid BMI 51.71, BMI currently 59.83  Lovenox PP    Asthma  Albuterol PRN at home  No issues at present          Estephanie Cervantes MD  Obstetrics  [facility] - Labor & Delivery

## 2023-04-07 NOTE — ASSESSMENT & PLAN NOTE
Admit to L&D  OB hypertension protocol   Pitocin per protocol  Continuous monitoring of maternal and fetal status  Anticipate progress and vaginal delivery   IUPC in place

## 2023-04-07 NOTE — TRANSFER OF CARE
"Anesthesia Transfer of Care Note    Patient: Ruth Ann Oropeza    Procedure(s) Performed: Procedure(s) (LRB):   SECTION (N/A)    Patient location: Labor and Delivery    Anesthesia Type: epidural    Transport from OR: Transported from OR on room air with adequate spontaneous ventilation    Post pain: adequate analgesia    Post assessment: no apparent anesthetic complications and tolerated procedure well    Post vital signs: stable    Level of consciousness: awake, alert and oriented    Nausea/Vomiting: no nausea/vomiting    Complications: none    Transfer of care protocol was followedComments: Epidural catheter removed with the tip intact.      Last vitals:   Visit Vitals  BP (!) 147/86   Pulse 82   Temp 36.6 °C (97.8 °F) (Oral)   Resp 16   Ht 5' 8" (1.727 m)   Wt (!) 178.5 kg (393 lb 8.3 oz)   SpO2 98%   Breastfeeding No   BMI 59.83 kg/m²     "

## 2023-04-07 NOTE — PROGRESS NOTES
"LABOR NOTE    S:  Pt resting comfortably with epidural, no complaints.  Family at bedside and supportive.     O: BP (!) 147/86   Pulse 82   Temp 97.8 °F (36.6 °C) (Oral)   Resp 16   Ht 5' 8" (1.727 m)   Wt (!) 178.5 kg (393 lb 8.3 oz)   SpO2 98%   Breastfeeding No   BMI 59.83 kg/m²     GENERAL: Calm and appropriate affect  NEURO: Alert, oriented, normal speech  ABDOMEN: Nontender, Fundus palpates soft between UC's.  FHT: Baseline 155, moderate BTBV, no accels, late decels. Cat 3, reassuring.  CTX: q 2-4 minutes  SVE: /-3      ASSESSMENT:   22 y.o.  IUP at 38w2d, Cat 3    Patient Active Problem List   Diagnosis    BMI 50.0-59.9, adult    Asthma    Obesity affecting pregnancy in third trimester    Chlamydia infection affecting pregnancy in third trimester    Leg swelling in pregnancy in third trimester    Attention deficit hyperactivity disorder (ADHD)    Anxiety and depression    Gestational hypertension without significant proteinuria during pregnancy in third trimester, antepartum    Non-reassuring fetal heart rate with late deceleration         PLAN:  Dr Cervantes notified of NRFHT's, no cervical change, pitocin off  Will proceed w/ primary  section  Team notified  Dr Cervantes discussed w/ pt      "

## 2023-04-07 NOTE — ASSESSMENT & PLAN NOTE
Admit to L&D  OB hypertension protocol   Pitocin per protocol  Continuous monitoring of maternal and fetal status  Anticipate progress and vaginal delivery   IUPC in place  04/07/2023  Continue to follow closely

## 2023-04-07 NOTE — PROGRESS NOTES
S: resting comfortable but starting to feel some pain with contractions through epidural.   O:  VS reviewed, afebrile   Vitals:    04/06/23 1820 04/06/23 1830 04/06/23 1900 04/06/23 1930   BP: (!) 152/91 (!) 155/89 (!) 142/84 135/79   Pulse: 73 75 76 77   Resp:       Temp:       TempSrc:       SpO2:   100% 95%   Weight:       Height:           FHTs 125,cat 1, reassuring  UC irritaiblity  SVE CRB in place    A: IUP @ 38w1d ;     Patient Active Problem List   Diagnosis    BMI 50.0-59.9, adult    Asthma    Obesity affecting pregnancy in third trimester    Chlamydia infection affecting pregnancy in third trimester    Leg swelling in pregnancy in third trimester    Attention deficit hyperactivity disorder (ADHD)    Anxiety and depression    Gestational hypertension without significant proteinuria during pregnancy in third trimester, antepartum       P:   Continue close monitoring of maternal/fetal status  Epidural bolus

## 2023-04-07 NOTE — PROGRESS NOTES
[facility] - Labor & Delivery  Obstetrics  Labor Progress Note    Patient Name: Ruth Ann Oropeza  MRN: [id number]  Admission Date: 2023  Hospital Length of Stay: 3 days  Attending Physician: Estephanie Cervantes MD  Primary Care Provider: Primary Doctor No    Subjective:     Principal Problem:Gestational hypertension without significant proteinuria during pregnancy in third trimester, antepartum    Hospital Course:  Admit to L&D  OB hypertension protocol   Cytotec 50mcg po q4hr  Cooks placement  Continuous monitoring of maternal and fetal status  Anticipate progress and vaginal delivery   REYNOLD upon request  23 Cervix Ft/thick/soft, continue with cytotec 50 q 4hr PO.  Labetalol IVP PRN as needed  23: Doing well this morning. Cervadil removed this am. Cervix 1/thick per RN. Will let patient eat breakfast this am then start with vaginal cytotec 25mcg Q4 hours and plan for CRB placement later today.   23 @ 0725 hrs AROM, mod amt clear fluid, cervix 5-6/70/-3, vertex, IUPC, continue pitocin per protocol, MVU's 200 or above      Interval History:  Ruth Ann is a 22 y.o.  at 38w2d. She is doing well. Comfortable w/ epidural    Objective:     Vital Signs (Most Recent):  Temp: 98 °F (36.7 °C) (23 1900)  Pulse: 79 (23 0700)  Resp: 14 (23 1551)  BP: 113/69 (23 0700)  SpO2: 97 % (23 0700) Vital Signs (24h Range):  Pulse:  [] 79  Resp:  [14] 14  SpO2:  [87 %-100 %] 97 %  BP: ()/() 113/69     Weight: (!) 178.5 kg (393 lb 8.3 oz)  Body mass index is 59.83 kg/m².    FHT: Cat 1 (reassuring)  TOCO:  Q 1.5-2 minutes    Cervical Exam:  Dilation:  5-6  Effacement:  70  Station: -3  Presentation: Vertex, AROM, mod amt clear fluid     Significant Labs:  Lab Results   Component Value Date    GROUPTRH O POS 2023    HEPBSAG Negative 2022    STREPBCULT No Group B Streptococcus isolated 2023       I have personallly reviewed all pertinent lab results from the last 24  hours.  Recent Lab Results       None            Physical Exam:   Constitutional: She is oriented to person, place, and time. She appears well-developed and well-nourished.    HENT:   Head: Normocephalic.       Pulmonary/Chest: Effort normal.        Abdominal: Soft.   Obese, gravid     Genitourinary: There is vaginal discharge in the vagina.    Genitourinary Comments: Clear amniotic fluid             Musculoskeletal: Normal range of motion. Edema present.      Comments: Limited movement of BLE d/t epidural anesthesia, swelling of BLE       Neurological: She is alert and oriented to person, place, and time.    Skin: Skin is warm and dry. Capillary refill takes less than 2 seconds.    Psychiatric: She has a normal mood and affect. Her behavior is normal. Judgment and thought content normal.     Review of Systems   Eyes:  Negative for visual disturbance.   Gastrointestinal:  Negative for abdominal pain.   Genitourinary:  Positive for vaginal pain.   Musculoskeletal:  Negative for back pain.   Neurological:  Negative for headaches.     Assessment/Plan:     22 y.o. female  at 38w2d for:    * Gestational hypertension without significant proteinuria during pregnancy in third trimester, antepartum  Admit to L&D  OB hypertension protocol   Pitocin per protocol  Continuous monitoring of maternal and fetal status  Anticipate progress and vaginal delivery   IUPC in place    Obesity affecting pregnancy in third trimester  Pre-gravid BMI 51.71, BMI currently 59.83  Lovenox PP    Asthma  Albuterol PRN at home  No issues at present        Viet Castañeda, KRISTEL  Obstetrics  [facility] - Labor [facility] Delivery

## 2023-04-07 NOTE — LACTATION NOTE
Baby is showing feeding cues. Helped mother to settle in a football hold position on the left breast. Education provided on a deep asymmetric latch and proper positioning. Mother has a large amount of colostrum noted. Mother reports that she has been hand expressing breast milk while pregnant. Infant with some nutritive and non-nutritive sucking noted. Some audible swallows noted, and mother denies pain or discomfort. Baby fed until content, and nipple shape and color is WDL upon unlatching.    Lactation packet reviewed for days 1-2.  Discussed early feeding cues and encouraged mother to feed baby in response to those cues. Encouraged on demand feedings and skin to skin.  Reviewed normal feeding expectations of 8 or more feedings per 24 hour period, cues that babies use to signal hunger and satiety and cluster feeding. Discussed the adequacy of colostrum and baby belly size for the first 3 days of life along with expected output.     Discussed risks of introducing non medically indicated supplementation with formula, a pacifier or artificial nipple and discussed the AAP recommendation to avoid the use of pacifiers until 1 month of age for breastfeeding infants.     Information provided to mother about how to obtain breast pump through insurance. [place] department of health electric breast pump request form faxed to [employer] at this time.     Mother states understanding and verbalized appropriate recall. Encouraged mother to call for assistance when desired or when infant is showing signs of hunger, contact number provided, mother verbalizes understanding.

## 2023-04-07 NOTE — ASSESSMENT & PLAN NOTE
04/07/2023  Pitocin off  Primary c/section recommended  Reviewed risks of c/section, including but not limited to:   infection, bleeding, damage to bowel, bladder, dvt, htn, cva; damage to fetus.  All questions answered  Consent signed and witnessed; OR/anesthesia aware  Ancef/azithromycin preop

## 2023-04-07 NOTE — SUBJECTIVE & OBJECTIVE
Interval History:  Ruth Ann is a 22 y.o.  at 38w2d. She is doing well. Comfortable w/ epidural    Objective:     Vital Signs (Most Recent):  Temp: 98 °F (36.7 °C) (23 1900)  Pulse: 79 (23 0700)  Resp: 14 (23 1551)  BP: 113/69 (23 0700)  SpO2: 97 % (23 0700) Vital Signs (24h Range):  Pulse:  [] 79  Resp:  [14] 14  SpO2:  [87 %-100 %] 97 %  BP: ()/() 113/69     Weight: (!) 178.5 kg (393 lb 8.3 oz)  Body mass index is 59.83 kg/m².    FHT: Cat 1 (reassuring)  TOCO:  Q 1.5-2 minutes    Cervical Exam:  Dilation:  5-6  Effacement:  70  Station: -3  Presentation: Vertex, AROM, mod amt clear fluid     Significant Labs:  Lab Results   Component Value Date    GROUPTRH O POS 2023    HEPBSAG Negative 2022    STREPBCULT No Group B Streptococcus isolated 2023       I have personallly reviewed all pertinent lab results from the last 24 hours.  Recent Lab Results       None            Physical Exam:   Constitutional: She is oriented to person, place, and time. She appears well-developed and well-nourished.    HENT:   Head: Normocephalic.       Pulmonary/Chest: Effort normal.        Abdominal: Soft.   Obese, gravid     Genitourinary: There is vaginal discharge in the vagina.    Genitourinary Comments: Clear amniotic fluid             Musculoskeletal: Normal range of motion. Edema present.      Comments: Limited movement of BLE d/t epidural anesthesia, swelling of BLE       Neurological: She is alert and oriented to person, place, and time.    Skin: Skin is warm and dry. Capillary refill takes less than 2 seconds.    Psychiatric: She has a normal mood and affect. Her behavior is normal. Judgment and thought content normal.     Review of Systems   Eyes:  Negative for visual disturbance.   Gastrointestinal:  Negative for abdominal pain.   Genitourinary:  Positive for vaginal pain.   Musculoskeletal:  Negative for back pain.   Neurological:  Negative for headaches.

## 2023-04-08 PROBLEM — A74.9 CHLAMYDIA INFECTION AFFECTING PREGNANCY IN THIRD TRIMESTER: Status: RESOLVED | Noted: 2023-02-13 | Resolved: 2023-04-08

## 2023-04-08 PROBLEM — O98.813 CHLAMYDIA INFECTION AFFECTING PREGNANCY IN THIRD TRIMESTER: Status: RESOLVED | Noted: 2023-02-13 | Resolved: 2023-04-08

## 2023-04-08 PROBLEM — O36.8390 NON-REASSURING FETAL HEART RATE WITH LATE DECELERATION: Status: RESOLVED | Noted: 2023-04-07 | Resolved: 2023-04-08

## 2023-04-08 PROCEDURE — 99231 SBSQ HOSP IP/OBS SF/LOW 25: CPT | Mod: 95,,, | Performed by: OBSTETRICS & GYNECOLOGY

## 2023-04-08 PROCEDURE — 11000001 HC ACUTE MED/SURG PRIVATE ROOM

## 2023-04-08 PROCEDURE — 63600175 PHARM REV CODE 636 W HCPCS: Performed by: OBSTETRICS & GYNECOLOGY

## 2023-04-08 PROCEDURE — 25000003 PHARM REV CODE 250: Performed by: OBSTETRICS & GYNECOLOGY

## 2023-04-08 PROCEDURE — 99231 PR SUBSEQUENT HOSPITAL CARE,LEVL I: ICD-10-PCS | Mod: 95,,, | Performed by: OBSTETRICS & GYNECOLOGY

## 2023-04-08 RX ORDER — ENOXAPARIN SODIUM 100 MG/ML
60 INJECTION SUBCUTANEOUS EVERY 12 HOURS
Status: DISCONTINUED | OUTPATIENT
Start: 2023-04-08 | End: 2023-04-11 | Stop reason: HOSPADM

## 2023-04-08 RX ORDER — FUROSEMIDE 20 MG/1
20 TABLET ORAL DAILY
Status: DISCONTINUED | OUTPATIENT
Start: 2023-04-08 | End: 2023-04-11 | Stop reason: HOSPADM

## 2023-04-08 RX ADMIN — HYDROCODONE BITARTRATE AND ACETAMINOPHEN 1 TABLET: 5; 325 TABLET ORAL at 01:04

## 2023-04-08 RX ADMIN — ENOXAPARIN SODIUM 60 MG: 100 INJECTION SUBCUTANEOUS at 09:04

## 2023-04-08 RX ADMIN — DOCUSATE SODIUM 100 MG: 100 CAPSULE, LIQUID FILLED ORAL at 09:04

## 2023-04-08 RX ADMIN — KETOROLAC TROMETHAMINE 30 MG: 30 INJECTION, SOLUTION INTRAMUSCULAR; INTRAVENOUS at 09:04

## 2023-04-08 RX ADMIN — HYDROCODONE BITARTRATE AND ACETAMINOPHEN 1 TABLET: 7.5; 325 TABLET ORAL at 02:04

## 2023-04-08 RX ADMIN — SERTRALINE HYDROCHLORIDE 100 MG: 50 TABLET ORAL at 09:04

## 2023-04-08 RX ADMIN — IBUPROFEN 800 MG: 800 TABLET, FILM COATED ORAL at 09:04

## 2023-04-08 RX ADMIN — PRENATAL VITAMINS-IRON FUMARATE 27 MG IRON-FOLIC ACID 0.8 MG TABLET 1 TABLET: at 09:04

## 2023-04-08 RX ADMIN — CHLORHEXIDINE GLUCONATE 0.12% ORAL RINSE 10 ML: 1.2 LIQUID ORAL at 09:04

## 2023-04-08 RX ADMIN — SENNOSIDES AND DOCUSATE SODIUM 1 TABLET: 50; 8.6 TABLET ORAL at 09:04

## 2023-04-08 RX ADMIN — KETOROLAC TROMETHAMINE 30 MG: 30 INJECTION, SOLUTION INTRAMUSCULAR; INTRAVENOUS at 04:04

## 2023-04-08 RX ADMIN — LABETALOL HYDROCHLORIDE 200 MG: 200 TABLET, FILM COATED ORAL at 09:04

## 2023-04-08 RX ADMIN — LABETALOL HYDROCHLORIDE 200 MG: 200 TABLET, FILM COATED ORAL at 01:04

## 2023-04-08 RX ADMIN — FUROSEMIDE 20 MG: 20 TABLET ORAL at 09:04

## 2023-04-08 RX ADMIN — FERROUS SULFATE TAB 325 MG (65 MG ELEMENTAL FE) 1 EACH: 325 (65 FE) TAB at 09:04

## 2023-04-08 RX ADMIN — FUROSEMIDE 20 MG: 10 INJECTION, SOLUTION INTRAMUSCULAR; INTRAVENOUS at 01:04

## 2023-04-08 RX ADMIN — HYDROCODONE BITARTRATE AND ACETAMINOPHEN 1 TABLET: 7.5; 325 TABLET ORAL at 09:04

## 2023-04-08 RX ADMIN — LABETALOL HYDROCHLORIDE 200 MG: 200 TABLET, FILM COATED ORAL at 06:04

## 2023-04-08 NOTE — ASSESSMENT & PLAN NOTE
04/08/2023  Pod #1  S/p primary c/section for nrfht  Afebrile  +void  Tolerating diet; encouraged ambulation and deep breathing exercises  Aware ok to shower  Anticipate discharge in 1-2 days

## 2023-04-08 NOTE — PLAN OF CARE
Patient afebrile and had no falls this shift. Fundus firm without massage and below umbilicus. Bleeding light, no clots passed this shift. Voids spontaneously. Ambulates independently. Pain well controlled with oral pain medication. Vital signs stable at this time. Visits infant in NICU. Will continue to monitor.

## 2023-04-08 NOTE — SUBJECTIVE & OBJECTIVE
Interval History:   Pod #2    She is doing well this morning. She is tolerating a regular diet without nausea or vomiting. She is voiding spontaneously. She is ambulating. She has passed flatus, and has not a BM. Vaginal bleeding is mild. She denies fever or chills. Abdominal pain is mild and controlled with oral medications. She Is breastfeeding.   Objective:     Vital Signs (Most Recent):  Temp:  (deferred per pt) (04/08/23 0400)  Pulse: 77 (04/08/23 0650)  Resp: 18 (04/08/23 0222)  BP: (!) 159/91 (04/08/23 0650)  SpO2: 99 % (04/07/23 1735)   Vital Signs (24h Range):  Temp:  [97.9 °F (36.6 °C)-99.6 °F (37.6 °C)] 97.9 °F (36.6 °C)  Pulse:  [] 77  Resp:  [16-18] 18  SpO2:  [95 %-100 %] 99 %  BP: (106-159)/() 159/91     Weight: (!) 178.5 kg (393 lb 8.3 oz)  Body mass index is 59.83 kg/m².      Intake/Output Summary (Last 24 hours) at 4/8/2023 0905  Last data filed at 4/8/2023 0310  Gross per 24 hour   Intake 1473.19 ml   Output 4365 ml   Net -2891.81 ml         Significant Labs:  Lab Results   Component Value Date    GROUPTRH O POS 04/04/2023    HEPBSAG Negative 11/30/2022    STREPBCULT No Group B Streptococcus isolated 03/22/2023     No results for input(s): HGB, HCT in the last 48 hours.    Recent Lab Results       None            Physical Exam:   Constitutional: She is oriented to person, place, and time. She appears well-developed.    HENT:   Head: Normocephalic.    Eyes: Pupils are equal, round, and reactive to light.     Cardiovascular:  Normal rate and regular rhythm.             Pulmonary/Chest: Effort normal and breath sounds normal.        Abdominal: Soft. Bowel sounds are normal. She exhibits no abdominal incision (aquasel dressing intact).     Genitourinary:    Genitourinary Comments: Fundus difficult to assess due to obese abdomen             Musculoskeletal: Normal range of motion. Edema (trace) present.       Neurological: She is alert and oriented to person, place, and time.    Skin: Skin  is warm and dry.    Psychiatric: She has a normal mood and affect. Her behavior is normal. Judgment and thought content normal.     Review of Systems

## 2023-04-08 NOTE — LACTATION NOTE
Lactation rounds: Infant output and weight loss WNL    Infant just transferred to NICU due to low temperatures. [employer] Symphony breast pump set up at bedside.  Instructed on proper usage and to adjust suction according to comfort level. Verified appropriate flange fit- 24 mm bilaterally. Reviewed frequency and duration of pumping in order to promote and maintain full milk supply. Hands-on pumping technique reviewed. Encouraged hand expression after. Instructed on proper cleaning of breast pump parts. Reviewed proper milk handling, collection, storage, and transportation. Voices understanding.    Mother was taught hand expression of breastmilk/colostrum. She was instructed to:  Sit upright and lean forward, if possible.  Perform gentle breast massage.  Form a C with her hand and place it about 1 inch back from the areola with the nipple centered between her index finger and her thumb.  Press, compress, relax:  Using her finger and thumb, apply pressure in an inward direction toward the breast without stretching the tissue, compress the breast tissue between her finger and thumb, then relax her finger and thumb. Repeat process for a few minutes.  Rotate placement of finger and thumb on the breasts to facilitate emptying.  If unable to feed immediately, place breastmilk/colostrum directly into a sterile storage container for later use. Place the babys breast milk label (with the date and time of collection and the names of mother's medications) on the container. Reviewed proper handling and storage of expressed breastmilk.   Patient effectively return demonstrated and verbalized understanding. 2 ml of colostrum collected and brought to infant in NICU.     Mother denies any further lactation needs or concerns at this time. Encouraged mother to call for assistance when desired. Lactation availability discussed. Mother verbalizes understanding of all education and counseling.

## 2023-04-08 NOTE — PROGRESS NOTES
[facility] ([facility])  Obstetrics  Postpartum Progress Note    Patient Name: Ruth Ann Oropeza  MRN: [id number]  Admission Date: 2023  Hospital Length of Stay: 4 days  Attending Physician: Estephanie Cervantes MD  Primary Care Provider: Primary Doctor No    Subjective:     Principal Problem:S/P  section    Hospital Course:  Admit to L&D  OB hypertension protocol   Cytotec 50mcg po q4hr  Cooks placement  Continuous monitoring of maternal and fetal status  Anticipate progress and vaginal delivery   REYNOLD upon request  23 Cervix Ft/thick/soft, continue with cytotec 50 q 4hr PO.  Labetalol IVP PRN as needed  23: Doing well this morning. Cervadil removed this am. Cervix 1/thick per RN. Will let patient eat breakfast this am then start with vaginal cytotec 25mcg Q4 hours and plan for CRB placement later today.   23 @ 0725 hrs AROM, mod amt clear fluid, cervix 5-6/70/-3, vertex, IUPC, continue pitocin per protocol, MVU's 200 or above  [id number]--pitocin stopped ; repetative late decels; remains 5-7 cm dilated, primary c/section; mother and infant transferred to [facility] for routine postpartum care      Interval History:   Pod #2    She is doing well this morning. She is tolerating a regular diet without nausea or vomiting. She is voiding spontaneously. She is ambulating. She has passed flatus, and has not a BM. Vaginal bleeding is mild. She denies fever or chills. Abdominal pain is mild and controlled with oral medications. She Is breastfeeding.   Objective:     Vital Signs (Most Recent):  Temp:  (deferred per pt) (23 0400)  Pulse: 77 (23 0650)  Resp: 18 (23 0222)  BP: (!) 159/91 (23 0650)  SpO2: 99 % (23 1735)   Vital Signs (24h Range):  Temp:  [97.9 °F (36.6 °C)-99.6 °F (37.6 °C)] 97.9 °F (36.6 °C)  Pulse:  [] 77  Resp:  [16-18] 18  SpO2:  [95 %-100 %] 99 %  BP: (106-159)/() 159/91     Weight: (!) 178.5 kg (393 lb 8.3 oz)  Body mass index is 59.83  kg/m².      Intake/Output Summary (Last 24 hours) at 2023 0905  Last data filed at 2023 0310  Gross per 24 hour   Intake 1473.19 ml   Output 4365 ml   Net -2891.81 ml         Significant Labs:  Lab Results   Component Value Date    GROUPTRH O POS 2023    HEPBSAG Negative 2022    STREPBCULT No Group B Streptococcus isolated 2023     No results for input(s): HGB, HCT in the last 48 hours.    Recent Lab Results       None            Physical Exam:   Constitutional: She is oriented to person, place, and time. She appears well-developed.    HENT:   Head: Normocephalic.    Eyes: Pupils are equal, round, and reactive to light.     Cardiovascular:  Normal rate and regular rhythm.             Pulmonary/Chest: Effort normal and breath sounds normal.        Abdominal: Soft. Bowel sounds are normal. She exhibits no abdominal incision (aquasel dressing intact).     Genitourinary:    Genitourinary Comments: Fundus difficult to assess due to obese abdomen             Musculoskeletal: Normal range of motion. Edema (trace) present.       Neurological: She is alert and oriented to person, place, and time.    Skin: Skin is warm and dry.    Psychiatric: She has a normal mood and affect. Her behavior is normal. Judgment and thought content normal.     Review of Systems    Assessment/Plan:     22 y.o. female  for:    * S/P  section  2023  Pod #1  S/p primary c/section for nrfht  Afebrile  +void  Tolerating diet; encouraged ambulation and deep breathing exercises  Aware ok to shower  Anticipate discharge in 1-2 days      Iron deficiency anemia secondary to inadequate dietary iron intake  2023  Continue iron postpartum    Gestational hypertension without significant proteinuria during pregnancy in third trimester, antepartum  Admit to L&D  OB hypertension protocol   Pitocin per protocol  Continuous monitoring of maternal and fetal status  Anticipate progress and vaginal delivery   IU  in place  04/08/2023  Continue to follow closely  04/08/2023  Labetalol 200mg tid  cotninue to follow closely    Leg swelling in pregnancy in third trimester  Lasix post op    Obesity affecting pregnancy in third trimester  Pre-gravid BMI 51.71, BMI currently 59.83  Lovenox PP    Asthma  Albuterol PRN at home  No issues at present        Disposition: As patient meets milestones, will plan to discharge in 1-2 days.    Estephanie Cervantes MD  Obstetrics  [facility] ([facility])

## 2023-04-08 NOTE — PLAN OF CARE
POC reviewed with no concerns. Fundus firm without massage bleeding is light. Bonding well with infant. Breastfeeding. Pain adequately managed. Awaiting first void post novak removal. Dressing intact and clean. Frequent checks made to ensure safety and comfort. Bed low, call bell within reach. Will continue to monitor.

## 2023-04-08 NOTE — NURSING
Removed novak per orders using aseptic technique cathter intact. Pt tolerated well no distress noted. Informed pt she has until 0930 to void. Pt verbalized understanding.

## 2023-04-08 NOTE — ASSESSMENT & PLAN NOTE
Admit to L&D  OB hypertension protocol   Pitocin per protocol  Continuous monitoring of maternal and fetal status  Anticipate progress and vaginal delivery   IUPC in place  04/08/2023  Continue to follow closely  04/08/2023  Labetalol 200mg tid  cotninue to follow closely

## 2023-04-09 PROCEDURE — 99231 SBSQ HOSP IP/OBS SF/LOW 25: CPT | Mod: 95,,, | Performed by: OBSTETRICS & GYNECOLOGY

## 2023-04-09 PROCEDURE — 63600175 PHARM REV CODE 636 W HCPCS: Performed by: OBSTETRICS & GYNECOLOGY

## 2023-04-09 PROCEDURE — 99231 PR SUBSEQUENT HOSPITAL CARE,LEVL I: ICD-10-PCS | Mod: 95,,, | Performed by: OBSTETRICS & GYNECOLOGY

## 2023-04-09 PROCEDURE — 11000001 HC ACUTE MED/SURG PRIVATE ROOM

## 2023-04-09 PROCEDURE — 25000003 PHARM REV CODE 250: Performed by: OBSTETRICS & GYNECOLOGY

## 2023-04-09 RX ADMIN — DOCUSATE SODIUM 100 MG: 100 CAPSULE, LIQUID FILLED ORAL at 09:04

## 2023-04-09 RX ADMIN — IBUPROFEN 800 MG: 800 TABLET, FILM COATED ORAL at 02:04

## 2023-04-09 RX ADMIN — LABETALOL HYDROCHLORIDE 300 MG: 100 TABLET, FILM COATED ORAL at 10:04

## 2023-04-09 RX ADMIN — CHLORHEXIDINE GLUCONATE 0.12% ORAL RINSE 10 ML: 1.2 LIQUID ORAL at 09:04

## 2023-04-09 RX ADMIN — ENOXAPARIN SODIUM 60 MG: 100 INJECTION SUBCUTANEOUS at 09:04

## 2023-04-09 RX ADMIN — CHLORHEXIDINE GLUCONATE 0.12% ORAL RINSE 10 ML: 1.2 LIQUID ORAL at 08:04

## 2023-04-09 RX ADMIN — LABETALOL HYDROCHLORIDE 200 MG: 200 TABLET, FILM COATED ORAL at 05:04

## 2023-04-09 RX ADMIN — IBUPROFEN 800 MG: 800 TABLET, FILM COATED ORAL at 10:04

## 2023-04-09 RX ADMIN — DOCUSATE SODIUM 100 MG: 100 CAPSULE, LIQUID FILLED ORAL at 08:04

## 2023-04-09 RX ADMIN — ENOXAPARIN SODIUM 60 MG: 100 INJECTION SUBCUTANEOUS at 08:04

## 2023-04-09 RX ADMIN — HYDROCODONE BITARTRATE AND ACETAMINOPHEN 1 TABLET: 5; 325 TABLET ORAL at 12:04

## 2023-04-09 RX ADMIN — PRENATAL VITAMINS-IRON FUMARATE 27 MG IRON-FOLIC ACID 0.8 MG TABLET 1 TABLET: at 09:04

## 2023-04-09 RX ADMIN — FUROSEMIDE 20 MG: 20 TABLET ORAL at 09:04

## 2023-04-09 RX ADMIN — SERTRALINE HYDROCHLORIDE 100 MG: 50 TABLET ORAL at 08:04

## 2023-04-09 RX ADMIN — FERROUS SULFATE TAB 325 MG (65 MG ELEMENTAL FE) 1 EACH: 325 (65 FE) TAB at 09:04

## 2023-04-09 RX ADMIN — IBUPROFEN 800 MG: 800 TABLET, FILM COATED ORAL at 05:04

## 2023-04-09 RX ADMIN — SENNOSIDES AND DOCUSATE SODIUM 1 TABLET: 50; 8.6 TABLET ORAL at 08:04

## 2023-04-09 RX ADMIN — LABETALOL HYDROCHLORIDE 300 MG: 100 TABLET, FILM COATED ORAL at 02:04

## 2023-04-09 NOTE — LACTATION NOTE
Lactation Rounds: Mother verbalized that she was pumping every 2-3 hours but took a break over night because she was tired. Mother verbalized that she was in a lot of pain yesterday and she was not pumping for longer than 10 min each pumping session. Mother reported to nurse that she was making a good amount of colostrum, yielding anywhere from 1.5-5ml with each pumping session.     Reviewed proper usage of symphony pump and to adjust suction according to comfort level. Reviewed with mother frequency and duration of pumping in order to promote and maintain full milk supply. Hands on pumping technique reviewed. Instructed mother on cleaning of breast pump parts. Reviewed proper milk handling, collection, storage, and transportation. Voices understanding.     Written instructions have been provided and were reviewed at this time. Hand expression reviewed, mother able to return demonstrate.Encouraged mother to contact lactation with any questions, concerns, or problems. Contact numbers provided, and mother verbalizes understanding.     Instruct the mother to:  Sit upright and lean forward if possible.  Apply warm, wet baby blanket/towel over breasts for a few minutes followed by gentle breast massage.  Form a C with her hand and place it about 1 inch back from the areola with the nipple centered between her thumb and index finger.  Press, compress, relax :  apply pressure in an inward direction toward the breast without stretching the tissue and then compress the breast tissue between her  fingers for a few minutes.  Rotate placement of fingers on the breasts to facilitate emptying.  If stored for later use, place the babys breastmilk label (with the date and time of collection and the names of meds she is taking) on  the container.  Place the container  immediately  into the breastmilk refrigerator or freezer for later use.       Mother verbalizes understanding of all education and counseling. Mother denies any  further lactation needs or concerns at this time. Discussed lactation availability. Encouraged mother to call for assistance when needs arise.

## 2023-04-09 NOTE — PROGRESS NOTES
[facility] ([facility])  Obstetrics  Postpartum Progress Note    Patient Name: Ruth Ann Oropeza  MRN: [id number]  Admission Date: 2023  Hospital Length of Stay: 5 days  Attending Physician: Estephanie Cervantes MD  Primary Care Provider: Primary Doctor No    Subjective:     Principal Problem:S/P  section    Hospital Course:  Admit to L&D  OB hypertension protocol   Cytotec 50mcg po q4hr  Cooks placement  Continuous monitoring of maternal and fetal status  Anticipate progress and vaginal delivery   REYNOLD upon request  23 Cervix Ft/thick/soft, continue with cytotec 50 q 4hr PO.  Labetalol IVP PRN as needed  23: Doing well this morning. Cervadil removed this am. Cervix 1/thick per RN. Will let patient eat breakfast this am then start with vaginal cytotec 25mcg Q4 hours and plan for CRB placement later today.   23 @ 0725 hrs AROM, mod amt clear fluid, cervix 5-6/70/-3, vertex, IUPC, continue pitocin per protocol, MVU's 200 or above  [id number]--pitocin stopped ; repetative late decels; remains 5-7 cm dilated, primary c/section; mother and infant transferred to [facility] for routine postpartum care  23--bp mildly elevated, labetalol increased to 300mg tid ; infant in nicu      Interval History:   Pod #2    She is doing well this morning. She is tolerating a regular diet without nausea or vomiting. She is voiding spontaneously. She is ambulating. She has passed flatus, and has a BM. Vaginal bleeding is mild. She denies fever or chills. Abdominal pain is mild and controlled with oral medications. She Is breastfeeding.   Objective:     Vital Signs (Most Recent):  Temp: 98.4 °F (36.9 °C) (23)  Pulse: 90 (23)  Resp: 18 (23)  BP: 139/85 (2312)  SpO2: 99 % (23 1735) Vital Signs (24h Range):  Temp:  [97.6 °F (36.4 °C)-99.7 °F (37.6 °C)] 98.4 °F (36.9 °C)  Pulse:  [74-94] 90  Resp:  [16-20] 18  BP: (139-142)/(76-93) 139/85     Weight: (!) 178.5 kg (393  lb 8.3 oz)  Body mass index is 59.83 kg/m².      Intake/Output Summary (Last 24 hours) at 2023 1000  Last data filed at 2023 1620  Gross per 24 hour   Intake --   Output 650 ml   Net -650 ml         Significant Labs:  Lab Results   Component Value Date    GROUPTRH O POS 2023    HEPBSAG Negative 2022    STREPBCULT No Group B Streptococcus isolated 2023     No results for input(s): HGB, HCT in the last 48 hours.    I have personallly reviewed all pertinent lab results from the last 24 hours.  Recent Lab Results       None            Physical Exam:   Constitutional: She is oriented to person, place, and time. She appears well-developed.    HENT:   Head: Normocephalic.    Eyes: Pupils are equal, round, and reactive to light.     Cardiovascular:  Normal rate and regular rhythm.             Pulmonary/Chest: Effort normal and breath sounds normal.        Abdominal: Soft. Bowel sounds are normal. She exhibits no abdominal incision (aquasel dressing intact).     Genitourinary:    Genitourinary Comments: Difficult to assess ut fundus due to obese abdomen             Musculoskeletal: Normal range of motion and moves all extremeties. Edema (trace) present.       Neurological: She is alert and oriented to person, place, and time. She has normal reflexes.    Skin: Skin is warm and dry.    Psychiatric: She has a normal mood and affect. Her behavior is normal. Judgment and thought content normal.     Review of Systems    Assessment/Plan:     22 y.o. female  for:    * S/P  section  2023  Pod #1  S/p primary c/section for nrfht  Afebrile  +void  Tolerating diet; encouraged ambulation and deep breathing exercises  Aware ok to shower  Anticipate discharge in 1-2 days  2023  Pod #2;  S/p primary c/section for nrfht  Infant now in nicu  Remains afebrile  Tolerating diet, +ambulation; +void  Anticipate discharge in 1-2 days        Iron deficiency anemia secondary to inadequate dietary  iron intake  04/09/2023  Continue iron postpartum    Gestational hypertension without significant proteinuria during pregnancy in third trimester, antepartum  Admit to L&D  OB hypertension protocol   Pitocin per protocol  Continuous monitoring of maternal and fetal status  Anticipate progress and vaginal delivery   IUPC in place  04/09/2023  Continue to follow closely  04/09/2023  Labetalol 200mg tid  cotninue to follow closely  4/9/23  bp mildly elevated, labetalol increased to 300mg tid    Leg swelling in pregnancy in third trimester  Lasix post op    Obesity affecting pregnancy in third trimester  Pre-gravid BMI 51.71, BMI currently 59.83  Lovenox PP  04/09/2023  Continue lovenox, bid and upon discharge ;       Asthma  Albuterol PRN at home  No issues at present        Disposition: As patient meets milestones, will plan to discharge in 1-2 days.    Estephanie Cervantes MD  Obstetrics  [facility] ([facility])

## 2023-04-09 NOTE — SUBJECTIVE & OBJECTIVE
Interval History:   Pod #2    She is doing well this morning. She is tolerating a regular diet without nausea or vomiting. She is voiding spontaneously. She is ambulating. She has passed flatus, and has a BM. Vaginal bleeding is mild. She denies fever or chills. Abdominal pain is mild and controlled with oral medications. She Is breastfeeding.   Objective:     Vital Signs (Most Recent):  Temp: 98.4 °F (36.9 °C) (04/09/23 0827)  Pulse: 90 (04/09/23 0827)  Resp: 18 (04/09/23 0827)  BP: 139/85 (04/09/23 0912)  SpO2: 99 % (04/07/23 1735) Vital Signs (24h Range):  Temp:  [97.6 °F (36.4 °C)-99.7 °F (37.6 °C)] 98.4 °F (36.9 °C)  Pulse:  [74-94] 90  Resp:  [16-20] 18  BP: (139-142)/(76-93) 139/85     Weight: (!) 178.5 kg (393 lb 8.3 oz)  Body mass index is 59.83 kg/m².      Intake/Output Summary (Last 24 hours) at 4/9/2023 1000  Last data filed at 4/8/2023 1620  Gross per 24 hour   Intake --   Output 650 ml   Net -650 ml         Significant Labs:  Lab Results   Component Value Date    GROUPTRH O POS 04/04/2023    HEPBSAG Negative 11/30/2022    STREPBCULT No Group B Streptococcus isolated 03/22/2023     No results for input(s): HGB, HCT in the last 48 hours.    I have personallly reviewed all pertinent lab results from the last 24 hours.  Recent Lab Results       None            Physical Exam:   Constitutional: She is oriented to person, place, and time. She appears well-developed.    HENT:   Head: Normocephalic.    Eyes: Pupils are equal, round, and reactive to light.     Cardiovascular:  Normal rate and regular rhythm.             Pulmonary/Chest: Effort normal and breath sounds normal.        Abdominal: Soft. Bowel sounds are normal. She exhibits no abdominal incision (aquasel dressing intact).     Genitourinary:    Genitourinary Comments: Difficult to assess ut fundus due to obese abdomen             Musculoskeletal: Normal range of motion and moves all extremeties. Edema (trace) present.       Neurological: She is alert  and oriented to person, place, and time. She has normal reflexes.    Skin: Skin is warm and dry.    Psychiatric: She has a normal mood and affect. Her behavior is normal. Judgment and thought content normal.     Review of Systems

## 2023-04-09 NOTE — ASSESSMENT & PLAN NOTE
Admit to L&D  OB hypertension protocol   Pitocin per protocol  Continuous monitoring of maternal and fetal status  Anticipate progress and vaginal delivery   IUPC in place  04/09/2023  Continue to follow closely  04/09/2023  Labetalol 200mg tid  cotninue to follow closely  4/9/23  bp mildly elevated, labetalol increased to 300mg tid

## 2023-04-09 NOTE — PLAN OF CARE
POC reviewed with no concerns. Fundus firm without massage bleeding is scant. Ambulating frequents, visits infant in NICU often. Pain managed. Aquacel CDI. Frequent checks made to ensure safety and comfort. Bed low, call bell within reach. Will continue to monitor.

## 2023-04-09 NOTE — ASSESSMENT & PLAN NOTE
04/09/2023  Pod #1  S/p primary c/section for nrfht  Afebrile  +void  Tolerating diet; encouraged ambulation and deep breathing exercises  Aware ok to shower  Anticipate discharge in 1-2 days  04/09/2023  Pod #2;  S/p primary c/section for nrfht  Infant now in nicu  Remains afebrile  Tolerating diet, +ambulation; +void  Anticipate discharge in 1-2 days

## 2023-04-09 NOTE — ASSESSMENT & PLAN NOTE
Pre-gravid BMI 51.71, BMI currently 59.83  Lovenox PP  04/09/2023  Continue lovenox, bid and upon discharge ;

## 2023-04-10 LAB
BASOPHILS # BLD AUTO: 0.05 K/UL (ref 0–0.2)
BASOPHILS NFR BLD: 0.7 % (ref 0–1.9)
DIFFERENTIAL METHOD: ABNORMAL
EOSINOPHIL # BLD AUTO: 0.2 K/UL (ref 0–0.5)
EOSINOPHIL NFR BLD: 3.2 % (ref 0–8)
ERYTHROCYTE [DISTWIDTH] IN BLOOD BY AUTOMATED COUNT: 14.8 % (ref 11.5–14.5)
HCT VFR BLD AUTO: 29.8 % (ref 37–48.5)
HGB BLD-MCNC: 9.2 G/DL (ref 12–16)
IMM GRANULOCYTES # BLD AUTO: 0.03 K/UL (ref 0–0.04)
IMM GRANULOCYTES NFR BLD AUTO: 0.4 % (ref 0–0.5)
LYMPHOCYTES # BLD AUTO: 1.8 K/UL (ref 1–4.8)
LYMPHOCYTES NFR BLD: 24.1 % (ref 18–48)
MCH RBC QN AUTO: 25.8 PG (ref 27–31)
MCHC RBC AUTO-ENTMCNC: 30.9 G/DL (ref 32–36)
MCV RBC AUTO: 84 FL (ref 82–98)
MONOCYTES # BLD AUTO: 0.4 K/UL (ref 0.3–1)
MONOCYTES NFR BLD: 5.5 % (ref 4–15)
NEUTROPHILS # BLD AUTO: 5 K/UL (ref 1.8–7.7)
NEUTROPHILS NFR BLD: 66.1 % (ref 38–73)
NRBC BLD-RTO: 0 /100 WBC
PLATELET # BLD AUTO: 191 K/UL (ref 150–450)
PMV BLD AUTO: 11.3 FL (ref 9.2–12.9)
RBC # BLD AUTO: 3.56 M/UL (ref 4–5.4)
WBC # BLD AUTO: 7.52 K/UL (ref 3.9–12.7)

## 2023-04-10 PROCEDURE — 85025 COMPLETE CBC W/AUTO DIFF WBC: CPT | Performed by: OBSTETRICS & GYNECOLOGY

## 2023-04-10 PROCEDURE — 25000003 PHARM REV CODE 250: Performed by: OBSTETRICS & GYNECOLOGY

## 2023-04-10 PROCEDURE — 63600175 PHARM REV CODE 636 W HCPCS: Performed by: OBSTETRICS & GYNECOLOGY

## 2023-04-10 PROCEDURE — 36415 COLL VENOUS BLD VENIPUNCTURE: CPT | Performed by: OBSTETRICS & GYNECOLOGY

## 2023-04-10 PROCEDURE — 11000001 HC ACUTE MED/SURG PRIVATE ROOM

## 2023-04-10 RX ORDER — CALCIUM CARBONATE 200(500)MG
500 TABLET,CHEWABLE ORAL 3 TIMES DAILY PRN
Status: DISCONTINUED | OUTPATIENT
Start: 2023-04-10 | End: 2023-04-11 | Stop reason: HOSPADM

## 2023-04-10 RX ADMIN — DOCUSATE SODIUM 100 MG: 100 CAPSULE, LIQUID FILLED ORAL at 08:04

## 2023-04-10 RX ADMIN — CHLORHEXIDINE GLUCONATE 0.12% ORAL RINSE 10 ML: 1.2 LIQUID ORAL at 08:04

## 2023-04-10 RX ADMIN — LABETALOL HYDROCHLORIDE 300 MG: 100 TABLET, FILM COATED ORAL at 09:04

## 2023-04-10 RX ADMIN — ENOXAPARIN SODIUM 60 MG: 100 INJECTION SUBCUTANEOUS at 08:04

## 2023-04-10 RX ADMIN — LABETALOL HYDROCHLORIDE 300 MG: 100 TABLET, FILM COATED ORAL at 06:04

## 2023-04-10 RX ADMIN — ENOXAPARIN SODIUM 60 MG: 100 INJECTION SUBCUTANEOUS at 09:04

## 2023-04-10 RX ADMIN — CHLORHEXIDINE GLUCONATE 0.12% ORAL RINSE 10 ML: 1.2 LIQUID ORAL at 09:04

## 2023-04-10 RX ADMIN — CALCIUM CARBONATE (ANTACID) CHEW TAB 500 MG 500 MG: 500 CHEW TAB at 04:04

## 2023-04-10 RX ADMIN — FERROUS SULFATE TAB 325 MG (65 MG ELEMENTAL FE) 1 EACH: 325 (65 FE) TAB at 08:04

## 2023-04-10 RX ADMIN — IBUPROFEN 800 MG: 800 TABLET, FILM COATED ORAL at 09:04

## 2023-04-10 RX ADMIN — SENNOSIDES AND DOCUSATE SODIUM 1 TABLET: 50; 8.6 TABLET ORAL at 09:04

## 2023-04-10 RX ADMIN — FUROSEMIDE 20 MG: 20 TABLET ORAL at 08:04

## 2023-04-10 RX ADMIN — LABETALOL HYDROCHLORIDE 300 MG: 100 TABLET, FILM COATED ORAL at 02:04

## 2023-04-10 RX ADMIN — SERTRALINE HYDROCHLORIDE 100 MG: 50 TABLET ORAL at 09:04

## 2023-04-10 RX ADMIN — PRENATAL VITAMINS-IRON FUMARATE 27 MG IRON-FOLIC ACID 0.8 MG TABLET 1 TABLET: at 08:04

## 2023-04-10 RX ADMIN — IBUPROFEN 800 MG: 800 TABLET, FILM COATED ORAL at 02:04

## 2023-04-10 RX ADMIN — DOCUSATE SODIUM 100 MG: 100 CAPSULE, LIQUID FILLED ORAL at 09:04

## 2023-04-10 RX ADMIN — IBUPROFEN 800 MG: 800 TABLET, FILM COATED ORAL at 06:04

## 2023-04-10 NOTE — PROGRESS NOTES
[facility] ([facility])  Obstetrics  Postpartum Progress Note    Patient Name: Ruth Ann Oropeza  MRN: [id number]  Admission Date: 2023  Hospital Length of Stay: 6 days  Attending Physician: Estephanie Cervantes MD  Primary Care Provider: Primary Doctor No    Subjective:     Principal Problem:S/P  section    Hospital Course:  Admit to L&D  OB hypertension protocol   Cytotec 50mcg po q4hr  Cooks placement  Continuous monitoring of maternal and fetal status  Anticipate progress and vaginal delivery   REYNOLD upon request  23 Cervix Ft/thick/soft, continue with cytotec 50 q 4hr PO.  Labetalol IVP PRN as needed  23: Doing well this morning. Cervadil removed this am. Cervix 1/thick per RN. Will let patient eat breakfast this am then start with vaginal cytotec 25mcg Q4 hours and plan for CRB placement later today.   23 @ 0725 hrs AROM, mod amt clear fluid, cervix 5-6/70/-3, vertex, IUPC, continue pitocin per protocol, MVU's 200 or above  [id number]--pitocin stopped ; repetative late decels; remains 5-7 cm dilated, primary c/section; mother and infant transferred to [facility] for routine postpartum care  23--bp mildly elevated, labetalol increased to 300mg tid ; infant in nicu  4/10/23: POD 3. Infant in NICU but doing well, per mom should be able to discharge soon. Mom doing well and meeting post-op milestones. Anticipate discharge today vs tomorrow.       Interval History: POD 3 s/p .    She is doing well this morning. She is tolerating a regular diet without nausea or vomiting. She is voiding spontaneously. She is ambulating. She has passed flatus, and has a BM. Vaginal bleeding is mild. She denies fever or chills. Abdominal pain is mild and controlled with oral medications. She Is breastfeeding via pumping for baby in NICU. She desires circumcision for her male baby: no.    Objective:     Vital Signs (Most Recent):  Temp: 99.3 °F (37.4 °C) (04/10/23 0844)  Pulse: 103 (04/10/23  0844)  Resp: 16 (04/10/23 0844)  BP: (!) 159/93 (04/10/23 0844)  SpO2: 99 % (04/07/23 1735)   Vital Signs (24h Range):  Temp:  [98.1 °F (36.7 °C)-99.3 °F (37.4 °C)] 99.3 °F (37.4 °C)  Pulse:  [] 103  Resp:  [16-20] 16  BP: (120-159)/(76-98) 159/93     Weight: (!) 178.5 kg (393 lb 8.3 oz)  Body mass index is 59.83 kg/m².    No intake or output data in the 24 hours ending 04/10/23 0943      Significant Labs:  Lab Results   Component Value Date    GROUPTRH O POS 04/04/2023    HEPBSAG Negative 11/30/2022    STREPBCULT No Group B Streptococcus isolated 03/22/2023     Recent Labs   Lab 04/10/23  0539   HGB 9.2*   HCT 29.8*       I have personallly reviewed all pertinent lab results from the last 24 hours.  Recent Lab Results         04/10/23  0539        Baso # 0.05       Basophil % 0.7       Differential Method Automated       Eos # 0.2       Eosinophil % 3.2       Gran # (ANC) 5.0       Gran % 66.1       Hematocrit 29.8       Hemoglobin 9.2       Immature Grans (Abs) 0.03  Comment: Mild elevation in immature granulocytes is non specific and   can be seen in a variety of conditions including stress response,   acute inflammation, trauma and pregnancy. Correlation with other   laboratory and clinical findings is essential.         Immature Granulocytes 0.4       Lymph # 1.8       Lymph % 24.1       MCH 25.8       MCHC 30.9       MCV 84       Mono # 0.4       Mono % 5.5       MPV 11.3       nRBC 0       Platelets 191       RBC 3.56       RDW 14.8       WBC 7.52               Physical Exam:   Constitutional: She is oriented to person, place, and time. She appears well-developed. No distress.    HENT:   Head: Normocephalic and atraumatic.    Eyes: Conjunctivae are normal. Right eye exhibits no discharge. Left eye exhibits no discharge. No scleral icterus.      Pulmonary/Chest: Effort normal. No respiratory distress.        Abdominal: Soft. She exhibits abdominal incision (Pfannenstiel incision with acquacel dressing in  place, CDI. Appropriately TTP.). She exhibits no distension.   Difficult to palpate uterus due to body habitus             Musculoskeletal: Normal range of motion. No edema.       Neurological: She is alert and oriented to person, place, and time.    Skin: Skin is warm and dry. She is not diaphoretic.          Assessment/Plan:     22 y.o. female  for:    * S/P  section  S/p primary c/section for nrfht on .  - POD 3  - Tolerating PO  - Pain managed on current regimen  - Ambulating  - Voiding spontaneously  - Doing well  - Infant in NICU but anticipating discharge within 1-2 days        Iron deficiency anemia secondary to inadequate dietary iron intake  - Continue iron postpartum    Gestational hypertension without significant proteinuria during pregnancy in third trimester, antepartum  - Labetalol 300mg TID  - BP slightly elevated but stable  - Denies pre-e symptoms  - Continue to monitor    Leg swelling in pregnancy in third trimester  - Lasix post op    Obesity affecting pregnancy in third trimester  - Continue lovenox, bid and x6 weeks upon discharge       Asthma  - Albuterol PRN at home  - No issues at present        Disposition: As patient meets milestones, will plan to discharge accordingly.    KATTY Nunez-C  Obstetrics  [facility] ([facility])

## 2023-04-10 NOTE — LACTATION NOTE
Lactation Rounds:    Mother requesting lactation. Upon arrival she is hand expressing. Educated mother to use the electric breastpump to stimulate milk supply. Mother verbalized understanding.     She wants to latch infant for noon feeding. Reassured mother I will be there to assist for the first breastfeed.    Mother verbalizes understanding of all education and counseling. Mother denies any further lactation needs or concerns at this time. Discussed lactation availability. Encouraged mother to call for assistance when needs arise.

## 2023-04-10 NOTE — ASSESSMENT & PLAN NOTE
S/p primary c/section for nrfht on 4/7.  - POD 3  - Tolerating PO  - Pain managed on current regimen  - Ambulating  - Voiding spontaneously  - Doing well  - Infant in NICU but anticipating discharge within 1-2 days

## 2023-04-10 NOTE — SUBJECTIVE & OBJECTIVE
Interval History: POD 3 s/p .    She is doing well this morning. She is tolerating a regular diet without nausea or vomiting. She is voiding spontaneously. She is ambulating. She has passed flatus, and has a BM. Vaginal bleeding is mild. She denies fever or chills. Abdominal pain is mild and controlled with oral medications. She Is breastfeeding via pumping for baby in NICU. She desires circumcision for her male baby: no.    Objective:     Vital Signs (Most Recent):  Temp: 99.3 °F (37.4 °C) (04/10/23 0844)  Pulse: 103 (04/10/23 0844)  Resp: 16 (04/10/23 0844)  BP: (!) 159/93 (04/10/23 0844)  SpO2: 99 % (23 1735)   Vital Signs (24h Range):  Temp:  [98.1 °F (36.7 °C)-99.3 °F (37.4 °C)] 99.3 °F (37.4 °C)  Pulse:  [] 103  Resp:  [16-20] 16  BP: (120-159)/(76-98) 159/93     Weight: (!) 178.5 kg (393 lb 8.3 oz)  Body mass index is 59.83 kg/m².    No intake or output data in the 24 hours ending 04/10/23 0943      Significant Labs:  Lab Results   Component Value Date    GROUPTRH O POS 2023    HEPBSAG Negative 2022    STREPBCULT No Group B Streptococcus isolated 2023     Recent Labs   Lab 04/10/23  0539   HGB 9.2*   HCT 29.8*       I have personallly reviewed all pertinent lab results from the last 24 hours.  Recent Lab Results         04/10/23  0539        Baso # 0.05       Basophil % 0.7       Differential Method Automated       Eos # 0.2       Eosinophil % 3.2       Gran # (ANC) 5.0       Gran % 66.1       Hematocrit 29.8       Hemoglobin 9.2       Immature Grans (Abs) 0.03  Comment: Mild elevation in immature granulocytes is non specific and   can be seen in a variety of conditions including stress response,   acute inflammation, trauma and pregnancy. Correlation with other   laboratory and clinical findings is essential.         Immature Granulocytes 0.4       Lymph # 1.8       Lymph % 24.1       MCH 25.8       MCHC 30.9       MCV 84       Mono # 0.4       Mono % 5.5       MPV  11.3       nRBC 0       Platelets 191       RBC 3.56       RDW 14.8       WBC 7.52               Physical Exam:   Constitutional: She is oriented to person, place, and time. She appears well-developed. No distress.    HENT:   Head: Normocephalic and atraumatic.    Eyes: Conjunctivae are normal. Right eye exhibits no discharge. Left eye exhibits no discharge. No scleral icterus.      Pulmonary/Chest: Effort normal. No respiratory distress.        Abdominal: Soft. She exhibits abdominal incision (Pfannenstiel incision with acquacel dressing in place, CDI. Appropriately TTP.). She exhibits no distension.   Difficult to palpate uterus due to body habitus             Musculoskeletal: Normal range of motion. No edema.       Neurological: She is alert and oriented to person, place, and time.    Skin: Skin is warm and dry. She is not diaphoretic.

## 2023-04-10 NOTE — LACTATION NOTE
Lactation Rounds:    Baby is showing feeding cues. Helped mother to settle in a football hold position on the left breast. Reviewed deep asymmetric latch and proper positioning. Mother is able to demonstrate back and latch easily obtained. Infrequent swallows despite frequent breast massage and compression. Mother denies pain or discomfort. Baby fed until content, and nipple shape and color is WDL upon unlatching. Reviewed hand expression and nipple care; mother able to return demonstrate.    Instructed mother to pump with electric breastpump after each breastfeeding and given infant EBM. Mother verbalized understanding.

## 2023-04-10 NOTE — LACTATION NOTE
Lactation Rounds:    Discussed feeding plan with mother. Strongly encouraged to call for assistance when latching, mother verbalized understanding.     Plan:    Feed based on feeding cues.  Skin to skin every 2-3 hours if no feeding cues.  Notify bedside nurse if no feeding 3 hours from beginning of last feeding.  Attempt latching infant for 10-15 minutes.   Supplement with all expressed breast milk available (from previous pumping/hand expression session)  Pump, hand express and collect all available colustrum for baby, save for next feeding.     Expected oral intake per feeding (according to [employer] Breastfeeding [employer]) & expected output for each day of life:  Day 2: 5-15 mL per feeding, 2 voids, 2 stools  Day 3: 15-30 mL per feeding, 3 voids, 3 stools  Day 4: 30-60 mL per feeding, 4 voids, 3 stools     Consider rental of hospital grade pump if primarily pumping for infants 1st month of life.    This might seem like a busy plan, but this plan allows us to feed the baby, and maintain milk supply!    Feed the baby. A baby who is getting the right amount of calories and nutrition is best able to learn how to nurse. First choice for what to feed a non-nursing baby is moms own milk.   Maintain milk supply. If moms milk supply is being maintained with an appropriate frequency and amount of milk expression, more time is available for baby to learn to nurse, and babys efforts will be better rewarded (with more milk).

## 2023-04-10 NOTE — ASSESSMENT & PLAN NOTE
- Labetalol 300mg TID  - BP slightly elevated but stable  - Denies pre-e symptoms  - Continue to monitor

## 2023-04-10 NOTE — LACTATION NOTE
Lactation Rounds:     Went to patients room to assist with breastfeeding. Mother reports she recently breastfeed infant. She reports hearing swallows. Encouraged mother to call when infant is making feeding cues for breastfeeding assessment. Mother verbalized understanding.

## 2023-04-10 NOTE — PLAN OF CARE
Pt is progressing well. Pain is controlled with PO pain meds. Ambulates independently and voids without difficulty. Pumping for baby in NICU. Dressing remains dry and intact. VSS. Will continue to monitor.

## 2023-04-11 VITALS
DIASTOLIC BLOOD PRESSURE: 66 MMHG | BODY MASS INDEX: 44.41 KG/M2 | RESPIRATION RATE: 17 BRPM | TEMPERATURE: 98 F | HEIGHT: 68 IN | OXYGEN SATURATION: 96 % | HEART RATE: 96 BPM | SYSTOLIC BLOOD PRESSURE: 130 MMHG | WEIGHT: 293 LBS

## 2023-04-11 PROBLEM — O12.03 LEG SWELLING IN PREGNANCY IN THIRD TRIMESTER: Status: RESOLVED | Noted: 2023-03-18 | Resolved: 2023-04-11

## 2023-04-11 PROBLEM — O99.213 OBESITY AFFECTING PREGNANCY IN THIRD TRIMESTER: Status: RESOLVED | Noted: 2022-12-13 | Resolved: 2023-04-11

## 2023-04-11 PROBLEM — O13.3 GESTATIONAL HYPERTENSION WITHOUT SIGNIFICANT PROTEINURIA DURING PREGNANCY IN THIRD TRIMESTER, ANTEPARTUM: Status: RESOLVED | Noted: 2023-04-04 | Resolved: 2023-04-11

## 2023-04-11 PROCEDURE — 99231 SBSQ HOSP IP/OBS SF/LOW 25: CPT | Mod: 95,,, | Performed by: OBSTETRICS & GYNECOLOGY

## 2023-04-11 PROCEDURE — 63600175 PHARM REV CODE 636 W HCPCS: Performed by: OBSTETRICS & GYNECOLOGY

## 2023-04-11 PROCEDURE — 99231 PR SUBSEQUENT HOSPITAL CARE,LEVL I: ICD-10-PCS | Mod: 95,,, | Performed by: OBSTETRICS & GYNECOLOGY

## 2023-04-11 PROCEDURE — 25000003 PHARM REV CODE 250: Performed by: OBSTETRICS & GYNECOLOGY

## 2023-04-11 RX ORDER — LABETALOL 300 MG/1
300 TABLET, FILM COATED ORAL EVERY 8 HOURS
Qty: 90 TABLET | Refills: 11 | Status: SHIPPED | OUTPATIENT
Start: 2023-04-11 | End: 2024-04-10

## 2023-04-11 RX ORDER — DOCUSATE SODIUM 100 MG/1
100 CAPSULE, LIQUID FILLED ORAL 2 TIMES DAILY
Qty: 30 CAPSULE | Refills: 0 | Status: SHIPPED | OUTPATIENT
Start: 2023-04-11 | End: 2023-05-16

## 2023-04-11 RX ORDER — FUROSEMIDE 20 MG/1
20 TABLET ORAL DAILY
Qty: 7 TABLET | Refills: 0 | Status: SHIPPED | OUTPATIENT
Start: 2023-04-11 | End: 2023-04-14

## 2023-04-11 RX ORDER — IBUPROFEN 800 MG/1
800 TABLET ORAL EVERY 8 HOURS
Qty: 30 TABLET | Refills: 0 | Status: SHIPPED | OUTPATIENT
Start: 2023-04-11 | End: 2023-05-16

## 2023-04-11 RX ORDER — FERROUS SULFATE 325(65) MG
325 TABLET, DELAYED RELEASE (ENTERIC COATED) ORAL DAILY
Qty: 30 TABLET | Refills: 6 | Status: SHIPPED | OUTPATIENT
Start: 2023-04-11

## 2023-04-11 RX ORDER — HYDROCODONE BITARTRATE AND ACETAMINOPHEN 7.5; 325 MG/1; MG/1
1 TABLET ORAL EVERY 6 HOURS PRN
Qty: 15 TABLET | Refills: 0 | Status: SHIPPED | OUTPATIENT
Start: 2023-04-11 | End: 2023-05-16

## 2023-04-11 RX ORDER — ENOXAPARIN SODIUM 100 MG/ML
60 INJECTION SUBCUTANEOUS EVERY 12 HOURS
Qty: 50.4 ML | Refills: 0 | Status: SHIPPED | OUTPATIENT
Start: 2023-04-11 | End: 2023-05-16 | Stop reason: SDUPTHER

## 2023-04-11 RX ADMIN — LABETALOL HYDROCHLORIDE 300 MG: 100 TABLET, FILM COATED ORAL at 02:04

## 2023-04-11 RX ADMIN — ENOXAPARIN SODIUM 60 MG: 100 INJECTION SUBCUTANEOUS at 09:04

## 2023-04-11 RX ADMIN — IBUPROFEN 800 MG: 800 TABLET, FILM COATED ORAL at 05:04

## 2023-04-11 RX ADMIN — FERROUS SULFATE TAB 325 MG (65 MG ELEMENTAL FE) 1 EACH: 325 (65 FE) TAB at 09:04

## 2023-04-11 RX ADMIN — IBUPROFEN 800 MG: 800 TABLET, FILM COATED ORAL at 02:04

## 2023-04-11 RX ADMIN — DOCUSATE SODIUM 100 MG: 100 CAPSULE, LIQUID FILLED ORAL at 09:04

## 2023-04-11 RX ADMIN — FUROSEMIDE 20 MG: 20 TABLET ORAL at 09:04

## 2023-04-11 RX ADMIN — LABETALOL HYDROCHLORIDE 300 MG: 100 TABLET, FILM COATED ORAL at 05:04

## 2023-04-11 RX ADMIN — PRENATAL VITAMINS-IRON FUMARATE 27 MG IRON-FOLIC ACID 0.8 MG TABLET 1 TABLET: at 09:04

## 2023-04-11 NOTE — PLAN OF CARE
Will continue to monitor self care and care of infant. No c/o pain noted at at this time. No apparent distress noted at this time. Fob at bedside to assist with care.

## 2023-04-11 NOTE — DISCHARGE INSTRUCTIONS
"Mother Self Care:    Activity: Avoid strenuous exercise and get adequate rest.  No driving until the physician consent given.  Emotional Changes: Most women find birth to be a time of great emotional upheaval.  Sense of loss, mood swings, fatigue, anxiety, and feeling "let down" are common.  If feelings worsen or last more than a week, call your physician.  Breast Care/Breastfeeding: Wear a bra for comfort.  Keep nipples dry and apply your own breast milk or lanolin cream as needed for soreness.  Engorgement can be relieved with warm, moist heat before feedings.  You may also take Ibuprofen.  Breast Care/Bottle Feeding: Wear support bra 24 hours a day for one week.  Avoid stimulation to breasts.  You may use ice packs for discomfort.  Meron-Care/Vaginal Bleeding: Remember to use your meron-bottle after urinating.  Your flow will change from red, to pink, to yellow/white color over a period of 2 weeks.  Menstruation will return in 3-8 weeks, or longer if breastfeeding.  Episiotomy Vaginal Delivery: Stitches will dissolve within 10 days to 3 weeks.  Warm baths, tucks, and dermoplast will promote healing.  Avoid bubble baths or strong soaps.   Section/Tubal Ligation: Keep incision clean and dry. You may shower, but avoid baths. Please continue to use Nozin twice a day for 7 days after surgery. Ensure you attend your 1 week post op visit to have your dressing removed. Use antibacterial soap to wash your entire body until directed otherwise by a Physician, it is very important to shower daily.   Sexual Activity/Pelvic Rest: No sexual activity, tampons, or douching until your physician gives you consent.  Diet: Continue to eat from the five basic food groups, including plenty of protein, fruits, vegetables, and whole grains.  Limit empty calories and high fat foods.  Drink enough fluids to satisfy thirst and add an extra 500 calories for breastfeeding.  Constipation/Hemorrhoids: Drink plenty of water.  You may take " a stool softener or natural laxative (Metamucil). You may use tucks or hemorrhoid ointment and soak in a warm tub.    CALL YOUR OB DOCTOR IF ANY OF THE FOLLOWING OCCURS:  *Heavy bleeding - saturating a pad an hour or passing any large (2-3 inches in size) blood clots.  *Any pain, redness, or tenderness in lower leg.  *You cannot care for yourself or your baby.  *Any signs of infection-      - Temperature greater than 100.5 degrees F      - Foul smelling vaginal discharge and/or incisional drainage      - Increased episiotomy or incisional pain      - Hot, hard, red or sore area on breast      - Flu-like symptoms      - Any urgency, frequency or burning with urination    Return To the Hospital for further Evaluation:  Headache not relieved by tylenol or ibuprofen  Blurry vision, double vision, seeing spots, or flashing lights  Feeling faint or passing out  Right epigastric pain  Difficulty breathing  Swelling in hands, face, or feet  Any of these symptoms accompanied by nausea/vomiting  Gaining more than 5 pounds in one week  Seizures  These symptoms could be an indication of elevated blood pressure.       If you have any questions that need to be answered immediately please call the Labor & Delivery Unit at [phone] and ask to speak to a nurse.

## 2023-04-11 NOTE — PROGRESS NOTES
[facility] ([facility])  Obstetrics  Postpartum Progress Note    Patient Name: Ruth Ann Oropeza  MRN: [id number]  Admission Date: 2023  Hospital Length of Stay: 7 days  Attending Physician: Estephanie Cervantes MD  Primary Care Provider: Primary Doctor No    Subjective:     Principal Problem:S/P  section    Hospital Course:  Admit to L&D  OB hypertension protocol   Cytotec 50mcg po q4hr  Cooks placement  Continuous monitoring of maternal and fetal status  Anticipate progress and vaginal delivery   REYNOLD upon request  23 Cervix Ft/thick/soft, continue with cytotec 50 q 4hr PO.  Labetalol IVP PRN as needed  23: Doing well this morning. Cervadil removed this am. Cervix 1/thick per RN. Will let patient eat breakfast this am then start with vaginal cytotec 25mcg Q4 hours and plan for CRB placement later today.   23 @ 0725 hrs AROM, mod amt clear fluid, cervix 5-6/70/-3, vertex, IUPC, continue pitocin per protocol, MVU's 200 or above  [id number]--pitocin stopped ; repetative late decels; remains 5-7 cm dilated, primary c/section; mother and infant transferred to [facility] for routine postpartum care  23--bp mildly elevated, labetalol increased to 300mg tid ; infant in nicu  4/10/23: POD 3. Infant in NICU but doing well, per mom should be able to discharge soon. Mom doing well and meeting post-op milestones. Anticipate discharge today vs tomorrow.   23: POD 4. Infant now rooming in. Patient doing well, ready for discharge.       Interval History: POD 4 s/p .    She is doing well this morning. She is tolerating a regular diet without nausea or vomiting. She is voiding spontaneously. She is ambulating. She has passed flatus, and has a BM. Vaginal bleeding is mild. She denies fever or chills. Abdominal pain is mild and controlled with oral medications. She Is breastfeeding. She desires circumcision for her male baby: no.    Objective:     Vital Signs (Most Recent):  Temp: 98.3 °F  (36.8 °C) (23 0359)  Pulse: 86 (23 0359)  Resp: 20 (23 035)  BP: (!) 166/88 (23 0541)  SpO2: 99 % (23 1735)   Vital Signs (24h Range):  Temp:  [98 °F (36.7 °C)-99.3 °F (37.4 °C)] 98.3 °F (36.8 °C)  Pulse:  [] 86  Resp:  [16-20] 20  BP: (129-166)/(76-93) 166/88     Weight: (!) 178.5 kg (393 lb 8.3 oz)  Body mass index is 59.83 kg/m².    No intake or output data in the 24 hours ending 23 0717      Significant Labs:  Lab Results   Component Value Date    GROUPTRH O POS 2023    HEPBSAG Negative 2022    STREPBCULT No Group B Streptococcus isolated 2023     Recent Labs   Lab 04/10/23  0539   HGB 9.2*   HCT 29.8*       I have personallly reviewed all pertinent lab results from the last 24 hours.  Recent Lab Results       None            Physical Exam:   Constitutional: She is oriented to person, place, and time. She appears well-developed. No distress.    HENT:   Head: Normocephalic and atraumatic.    Eyes: Conjunctivae are normal. Right eye exhibits no discharge. Left eye exhibits no discharge. No scleral icterus.      Pulmonary/Chest: Effort normal. No respiratory distress.        Abdominal: Soft. She exhibits abdominal incision (Pfannenstiel incision with acquacel dressing in place, CDI. Appropriately TTP.). She exhibits no distension.   Difficult to assess fundus due to body habitus             Musculoskeletal: Normal range of motion. No edema.       Neurological: She is alert and oriented to person, place, and time.    Skin: Skin is warm and dry. She is not diaphoretic.        Assessment/Plan:     22 y.o. female  for:    * S/P  section  S/p primary c/section for nrfht on .  - POD 4  - Tolerating PO  - Pain managed on current regimen  - Ambulating  - Voiding spontaneously  - Doing well        Iron deficiency anemia secondary to inadequate dietary iron intake  - Continue iron postpartum    Gestational hypertension without significant  proteinuria during pregnancy in third trimester, antepartum  - Labetalol 300mg TID  - BP slightly elevated but stable  - Denies pre-e symptoms  - Continue to monitor    Leg swelling in pregnancy in third trimester  - Lasix post op    Obesity affecting pregnancy in third trimester  - Continue Lovenox BID and x6 weeks upon discharge       Asthma  - Albuterol PRN at home  - No issues at present        Disposition: As patient meets milestones, will plan to discharge today.    KATTY Nunez-KG  Obstetrics  [facility] ([facility])

## 2023-04-11 NOTE — DISCHARGE SUMMARY
[facility] ([facility])  Obstetrics  Discharge Summary      Patient Name: Ruth Ann Oropeza  MRN: [id number]  Admission Date: 2023  Hospital Length of Stay: 7 days  Discharge Date and Time:  2023 7:33 AM  Attending Physician: Estephanie Cervantes MD   Discharging Provider: Jane Lim PA-C   Primary Care Provider: Primary Doctor No    HPI: Sent from clinic d/t elevated blood pressures and headache unrelieved by tylenol           Procedure(s) (LRB):   SECTION (N/A)     Hospital Course:   Admit to L&D  OB hypertension protocol   Cytotec 50mcg po q4hr  Cooks placement  Continuous monitoring of maternal and fetal status  Anticipate progress and vaginal delivery   REYNOLD upon request  23 Cervix Ft/thick/soft, continue with cytotec 50 q 4hr PO.  Labetalol IVP PRN as needed  23: Doing well this morning. Cervadil removed this am. Cervix 1/thick per RN. Will let patient eat breakfast this am then start with vaginal cytotec 25mcg Q4 hours and plan for CRB placement later today.   23 @ 0725 hrs AROM, mod amt clear fluid, cervix 5-6/70/-3, vertex, IUPC, continue pitocin per protocol, MVU's 200 or above  [id number]--pitocin stopped ; repetative late decels; remains 5-7 cm dilated, primary c/section; mother and infant transferred to [facility] for routine postpartum care  23--bp mildly elevated, labetalol increased to 300mg tid ; infant in nicu  4/10/23: POD 3. Infant in NICU but doing well, per mom should be able to discharge soon. Mom doing well and meeting post-op milestones. Anticipate discharge today vs tomorrow.   23: POD 4. Infant now rooming in. Patient doing well, ready for discharge.            Final Active Diagnoses:    Diagnosis Date Noted POA    PRINCIPAL PROBLEM:  S/P  section [Z98.891] 2023 Not Applicable    Iron deficiency anemia secondary to inadequate dietary iron intake [D50.8] 2023 Yes    Asthma [J45.909] 2022 Yes      Problems Resolved  During this Admission:    Diagnosis Date Noted Date Resolved POA    Non-reassuring fetal heart rate with late deceleration [O36.8390] 2023 No    Gestational hypertension without significant proteinuria during pregnancy in third trimester, antepartum [O13.3] 2023 Yes    Leg swelling in pregnancy in third trimester [O12.03] 2023 Yes    Obesity affecting pregnancy in third trimester [O99.213] 2022 Yes        Significant Diagnostic Studies: Labs:   BMP: No results for input(s): GLU, NA, K, CL, CO2, BUN, CREATININE, CALCIUM, MG in the last 48 hours., CMP No results for input(s): NA, K, CL, CO2, GLU, BUN, CREATININE, CALCIUM, PROT, ALBUMIN, BILITOT, ALKPHOS, AST, ALT, ANIONGAP, ESTGFRAFRICA, EGFRNONAA in the last 48 hours., CBC   Recent Labs   Lab 04/10/23  0539   WBC 7.52   HGB 9.2*   HCT 29.8*       and All labs within the past 24 hours have been reviewed      Feeding Method: both breast and bottle    Immunizations     None          Delivery:    Episiotomy: None   Lacerations: None   Repair suture:     Repair # of packets: 9   Blood loss (ml):       Birth information:  YOB: 2021   Time of birth: 2:24 PM   Sex: male   Delivery type: , Low Transverse   Gestational Age: 38w2d    Delivery Clinician:      Other providers:       Additional  information:  Forceps:    Vacuum:    Breech:    Observed anomalies      Living?:           APGARS  One minute Five minutes Ten minutes   Skin color:         Heart rate:         Grimace:         Muscle tone:         Breathing:         Totals: 8  9        Placenta: Delivered:       appearance    Pending Diagnostic Studies:     None          Discharged Condition: good    Disposition: Home or Self Care    Follow Up:   Follow-up Information     Estephanie Cervantes MD Follow up in 4 week(s).    Specialty: Obstetrics and Gynecology  Why: post op check ([facility])  Contact  information:  [address] [facility]  [place] [address]  [phone]             [facility] up on 4/14/2023.    Why: dressing removal                     Patient Instructions:      Diet Adult Regular     Lifting restrictions   Order Comments: No lifting anything over 15lbs for the next 6 weeks.     No driving until:   Order Comments: No driving while taking pain medication.     Pelvic Rest   Order Comments: No tampon use, douching, or intercourse for 6 weeks.     Leave dressing on - Keep it clean, dry, and intact until clinic visit   Order Comments: Dressing will be removed at 1 week nurse visit.  Showers only- no baths for 6 weeks.     Notify your health care provider if you experience any of the following:  temperature >100.4     Notify your health care provider if you experience any of the following:  severe uncontrolled pain     Notify your health care provider if you experience any of the following:  redness, tenderness, or signs of infection (pain, swelling, redness, odor or green/yellow discharge around incision site)     Notify your health care provider if you experience any of the following:  difficulty breathing or increased cough     Notify your health care provider if you experience any of the following:  severe persistent headache     Notify your health care provider if you experience any of the following:  worsening rash     Notify your health care provider if you experience any of the following:  persistent dizziness, light-headedness, or visual disturbances     Notify your health care provider if you experience any of the following:  increased confusion or weakness     Medications:  Current Discharge Medication List      START taking these medications    Details   docusate sodium (COLACE) 100 MG capsule Take 1 capsule (100 mg total) by mouth 2 (two) times daily.  Qty: 30 capsule, Refills: 0      enoxaparin (LOVENOX) 60 mg/0.6 mL Syrg Inject 0.6 mLs (60 mg total) into the skin every 12 (twelve)  hours.  Qty: 50.4 mL, Refills: 0      ferrous sulfate 325 (65 FE) MG EC tablet Take 1 tablet (325 mg total) by mouth once daily.  Qty: 30 tablet, Refills: 6      furosemide (LASIX) 20 MG tablet Take 1 tablet (20 mg total) by mouth once daily. for 7 days  Qty: 7 tablet, Refills: 0      HYDROcodone-acetaminophen (NORCO) 7.5-325 mg per tablet Take 1 tablet by mouth every 6 (six) hours as needed for Pain.  Qty: 15 tablet, Refills: 0    Comments: Quantity prescribed more than 7 day supply? No      ibuprofen (ADVIL,MOTRIN) 800 MG tablet Take 1 tablet (800 mg total) by mouth every 8 (eight) hours.  Qty: 30 tablet, Refills: 0      labetaloL (NORMODYNE) 300 MG tablet Take 1 tablet (300 mg total) by mouth every 8 (eight) hours.  Qty: 90 tablet, Refills: 11    Comments: .         CONTINUE these medications which have NOT CHANGED    Details   albuterol (PROVENTIL/VENTOLIN HFA) 90 mcg/actuation inhaler Inhale into the lungs.      aspirin (ECOTRIN) 81 MG EC tablet Take 1 tablet (81 mg total) by mouth once. for 1 dose  Qty: 90 tablet, Refills: 1    Associated Diagnoses: Obesity affecting pregnancy in second trimester; 24 weeks gestation of pregnancy      pantoprazole (PROTONIX) 40 MG tablet Take 1 tablet (40 mg total) by mouth once daily.  Qty: 30 tablet, Refills: 1      prenatal vit103-iron fum-folic () 27 mg iron- 1 mg Tab Take 1 tablet by mouth once daily.  Qty: 90 tablet, Refills: 3    Comments: Any generic on plan may be used      sertraline (ZOLOFT) 50 MG tablet Take 2 tablets (100 mg total) by mouth once daily.  Qty: 60 tablet, Refills: 6             Jane Lim PA-C  Obstetrics  [facility] ([facility])

## 2023-04-11 NOTE — SUBJECTIVE & OBJECTIVE
Interval History: POD 4 s/p .    She is doing well this morning. She is tolerating a regular diet without nausea or vomiting. She is voiding spontaneously. She is ambulating. She has passed flatus, and has a BM. Vaginal bleeding is mild. She denies fever or chills. Abdominal pain is mild and controlled with oral medications. She Is breastfeeding. She desires circumcision for her male baby: no.    Objective:     Vital Signs (Most Recent):  Temp: 98.3 °F (36.8 °C) (23 0359)  Pulse: 86 (23 035)  Resp: 20 (23 035)  BP: (!) 166/88 (23 0541)  SpO2: 99 % (23 1735)   Vital Signs (24h Range):  Temp:  [98 °F (36.7 °C)-99.3 °F (37.4 °C)] 98.3 °F (36.8 °C)  Pulse:  [] 86  Resp:  [16-20] 20  BP: (129-166)/(76-93) 166/88     Weight: (!) 178.5 kg (393 lb 8.3 oz)  Body mass index is 59.83 kg/m².    No intake or output data in the 24 hours ending 23 0717      Significant Labs:  Lab Results   Component Value Date    GROUPTRH O POS 2023    HEPBSAG Negative 2022    STREPBCULT No Group B Streptococcus isolated 2023     Recent Labs   Lab 04/10/23  0539   HGB 9.2*   HCT 29.8*       I have personallly reviewed all pertinent lab results from the last 24 hours.  Recent Lab Results       None            Physical Exam:   Constitutional: She is oriented to person, place, and time. She appears well-developed. No distress.    HENT:   Head: Normocephalic and atraumatic.    Eyes: Conjunctivae are normal. Right eye exhibits no discharge. Left eye exhibits no discharge. No scleral icterus.      Pulmonary/Chest: Effort normal. No respiratory distress.        Abdominal: Soft. She exhibits abdominal incision (Pfannenstiel incision with acquacel dressing in place, CDI. Appropriately TTP.). She exhibits no distension.   Difficult to assess fundus due to body habitus             Musculoskeletal: Normal range of motion. No edema.       Neurological: She is alert and oriented to person,  place, and time.    Skin: Skin is warm and dry. She is not diaphoretic.

## 2023-04-11 NOTE — PLAN OF CARE
Patient afebrile and had no falls this shift. Fundus firm without massage and below umbilicus. Bleeding light, no clots passed this shift. Voids spontaneously.  Pain well controlled with oral pain medication. Vital signs stable at this time. Bonding well with infant; responds to infant cues and participates in infant care. Will continue to monitor.

## 2023-04-11 NOTE — LACTATION NOTE
Lactation rounds: mother reports additional void not noted in chart, thus infant with minimally adequate output and borderline weight loss.     Mother states that she is now feeding infant at the breast and then offering bottles of EBM and formula. Mother states that she can latch infant well to both breast without difficulty or pain and she can hear infants swallowing. Mother reports infant is sleepy at the breast and she has not been waking infant for feeds. Discussed importance of waking infant for feeds and appropriate supplementation volumes for days of life 3, 4 & 5+, as set forth my the AABM. Encouraged mother to call for latch assessment prior to discharge. Mother instructed to immediately discontinue the use of syringes and supplement infant with bottles only. Mother has a Baby Alexus breast pump for immediate home use. Pumping basics discussed.     The following feeding plan was developed for all subsequent feedings until further notified, mother able to teach back:  Call lactation warmline with update on whichever occurs first: infant latches and feeds nutritively and takes a full feed at the breast before day of life 5, if infant does not feed effectively at the breast by day of life 5.  Feed based on feeding cues, 8 or more each 24 hours.  Contact lactation and pediatrician if infant is not feeding 8 or more times in 24 hours or is not producing expected output for each day of life set forth by the American Academy of Breastfeeding Medicine (AABM), which can be found in your Mother's Breastfeeding Guide.  Frequent skin to skin contact to encourage feeds and feed infant skin to skin.  Feeding baby:     If feeding is not nutritive after 10 minutes and after every breastfeeding session until further notified:   Give all supplements with bottle nipple using paced bottle feeding techniques  Supplement with all expressed breast milk  Provide infant with formula supplementation if infant does not seem satisfied  with all available EBM or if ordered by pediatrician  Pump and hand express and collect all available EBM for baby; feed to infant or store properly for future feeding  Clean pump kit    Mother anticipates discharge home today. Reviewed signs of good attachment. Reviewed breast massage and compression during feedings and indications for use. Reviewed signs of effective milk transfer and instructed to call pediatrician and lactation if signs not present. Discussed expected feeding and output pattern for days of life 3, 4, & 5+; mother instructed to call pediatrician and lactation if infant is not meeting feeding and output goals.     Reviewed signs of engorgement and expectant management. Reviewed signs of mastitis and instructed mother to call OB provider and lactation if any signs present. Discussed proper use of First Alert Form. Reviewed proper milk handling, collection and storage guidelines. Reviewed nursing diet and nutrition. Discussed resources for medication safety while breastfeeding. Reviewed available outpatient lactation resources.     Mother verbalizes understanding of all education and counseling; she denies any further lactation needs or concerns at this time. Encouraged mother to contact lactation with any questions, concerns, or problems, contact number provided.

## 2023-04-11 NOTE — PLAN OF CARE
AVS sheet reviewed. Educated on self care, follow-up appointments, and signs/symptoms of pre-eclampsia. Patient verbalizes understanding.

## 2023-04-11 NOTE — ASSESSMENT & PLAN NOTE
S/p primary c/section for nrfht on 4/7.  - POD 4  - Tolerating PO  - Pain managed on current regimen  - Ambulating  - Voiding spontaneously  - Doing well

## 2023-04-12 ENCOUNTER — TELEPHONE (OUTPATIENT)
Dept: OBSTETRICS AND GYNECOLOGY | Facility: HOSPITAL | Age: 23
End: 2023-04-12

## 2023-04-13 NOTE — TELEPHONE ENCOUNTER
Pt called at approximately 2130 stating her BP has been elevated 165/108 and higher all afternoon. Pt states she has a headache, blurred vision, and new swelling onset. Pt advised to come to hospital asap for evaluation.

## 2023-04-14 ENCOUNTER — POSTPARTUM VISIT (OUTPATIENT)
Dept: OBSTETRICS AND GYNECOLOGY | Facility: CLINIC | Age: 23
End: 2023-04-14
Payer: MEDICAID

## 2023-04-14 VITALS — HEIGHT: 68 IN | BODY MASS INDEX: 59.83 KG/M2 | DIASTOLIC BLOOD PRESSURE: 80 MMHG | SYSTOLIC BLOOD PRESSURE: 135 MMHG

## 2023-04-14 DIAGNOSIS — Z98.891 S/P CESAREAN SECTION: Primary | ICD-10-CM

## 2023-04-14 PROCEDURE — 99999 PR PBB SHADOW E&M-EST. PATIENT-LVL III: CPT | Mod: PBBFAC,,, | Performed by: PHYSICIAN ASSISTANT

## 2023-04-14 PROCEDURE — 99999 PR PBB SHADOW E&M-EST. PATIENT-LVL III: ICD-10-PCS | Mod: PBBFAC,,, | Performed by: PHYSICIAN ASSISTANT

## 2023-04-14 PROCEDURE — 99213 OFFICE O/P EST LOW 20 MIN: CPT | Mod: PBBFAC | Performed by: PHYSICIAN ASSISTANT

## 2023-04-14 PROCEDURE — 59430 PR CARE AFTER DELIVERY ONLY: ICD-10-PCS | Mod: TH,,, | Performed by: PHYSICIAN ASSISTANT

## 2023-04-14 RX ORDER — FUROSEMIDE 40 MG/1
40 TABLET ORAL DAILY
Qty: 4 TABLET | Refills: 0 | Status: SHIPPED | OUTPATIENT
Start: 2023-04-14 | End: 2023-05-16

## 2023-04-14 NOTE — PROGRESS NOTES
OBGYN Post-op Clinic  History and Physical    Patient Name: Ruth Ann Oropeza  YOB: 2000 (22 y.o.)  MRN: [id number]  Today's Date: 2023    Referring Md:   No referring provider defined for this encounter.    SUBJECTIVE:     Chief Complaint: Post-op  Dressing Removal    History of Present Illness:  Ruth Ann Oropeza is a 22 y.o. female  who presents to the clinic today for acquacel dressing removal and BP check. S/p  on . Currently taking Labetalol 300mg TID. Denies pre-e symptoms. BP stable in 130s/80s in clinic. States that her leg swelling is quite painful even though she has been ambulating and elevating her legs when seated. Also reports some hip discomfort when climbing stairs; states that she sometimes feels a click.       Review of patient's allergies indicates:  No Known Allergies    Past Medical History:   Diagnosis Date    Asthma     Attention deficit hyperactivity disorder (ADHD) 3/30/2023    Chlamydia infection affecting pregnancy in third trimester 2023    + in triage 23- Azithromycin sent  PUNEET neg    Gestational hypertension without significant proteinuria during pregnancy in third trimester, antepartum     Iron deficiency anemia secondary to inadequate dietary iron intake 2023    Non-reassuring fetal heart rate with late deceleration 2023     Past Surgical History:   Procedure Laterality Date     SECTION N/A 2023    Procedure:  SECTION;  Surgeon: Estephanie Cervantes MD;  Location: [facility] L&D;  Service: OB/GYN;  Laterality: N/A;    WISDOM TOOTH EXTRACTION       Family History   Problem Relation Age of Onset    Hypertension Father      Social History     Tobacco Use    Smoking status: Former     Types: Cigarettes   Substance Use Topics    Alcohol use: Never    Drug use: Never        Review of Systems:  Review of Systems   Constitutional:  Negative for chills and fever.   HENT:  Negative for congestion and sore throat.   "  Respiratory:  Negative for cough and shortness of breath.    Cardiovascular:  Positive for leg swelling (BLE). Negative for chest pain.   Gastrointestinal:  Negative for constipation, diarrhea, nausea and vomiting.   Genitourinary:  Negative for dysuria.   Skin:  Negative for rash.   Neurological:  Positive for headaches (Mild, occasional, relieved with medication). Negative for weakness.     OBJECTIVE:     Vital Signs (Most Recent)  /80   Ht 5' 8" (1.727 m)   Breastfeeding No   BMI 59.83 kg/m²     Physical Exam  Vitals and nursing note reviewed.   Constitutional:       General: She is not in acute distress.     Appearance: Normal appearance.   HENT:      Head: Normocephalic and atraumatic.   Eyes:      General: No scleral icterus.        Right eye: No discharge.         Left eye: No discharge.      Conjunctiva/sclera: Conjunctivae normal.   Pulmonary:      Effort: Pulmonary effort is normal. No respiratory distress.   Abdominal:      Palpations: Abdomen is soft.      Tenderness: There is abdominal tenderness (Appropriately TTP).      Comments: Aquacel dressing over Pfannenstiel incision removed in clinic. Incision intact with no erythema, warmth, or drainage.    Musculoskeletal:         General: Normal range of motion.      Cervical back: Normal range of motion.      Right lower leg: Edema present.      Left lower leg: Edema present.   Skin:     General: Skin is warm and dry.   Neurological:      General: No focal deficit present.      Mental Status: She is alert and oriented to person, place, and time.   Psychiatric:         Mood and Affect: Mood normal.         Behavior: Behavior normal.         Thought Content: Thought content normal.         Judgment: Judgment normal.           ASSESSMENT/PLAN:     Ruth Ann Oropeza is a 22 y.o. female was seen today for dressing change and blood pressure check. Will do a few days of Lasix to help with leg swelling. Advised patient that it might affect her milk " supply and if it does she may stop taking it. Also advised patient that if her hip continues to cause her discomfort she will need to be evaluated by an orthopedist. Patient verbalized understanding and agreed to plan.   I insured patient has a scheduled postpartum visit within one month. Once again, I reviewed all restrictions & limitations with the patient and instructed her to call clinic with any concerning issues or symptoms. Instructed the importance of showering daily, no tub baths, using an antibacterial soap. Patient verbalized understanding.     Ruth Ann was seen today for wound check.    Diagnoses and all orders for this visit:    S/P  section       - Showers only, pelvic rest, and no heavy lifting/strenuous activity for 6 weeks       - Ibuprofen PRN for pain       - Keep f/u appointment with surgeon in a few weeks    Other orders  -     furosemide (LASIX) 40 MG tablet; Take 1 tablet (40 mg total) by mouth once daily. for 4 days        Jane Lim [facility], PA-C  OBGYN - Surgery  [facility]

## 2023-04-17 ENCOUNTER — PATIENT MESSAGE (OUTPATIENT)
Dept: OBSTETRICS AND GYNECOLOGY | Facility: CLINIC | Age: 23
End: 2023-04-17
Payer: MEDICAID

## 2023-04-19 ENCOUNTER — PATIENT MESSAGE (OUTPATIENT)
Dept: OBSTETRICS AND GYNECOLOGY | Facility: CLINIC | Age: 23
End: 2023-04-19
Payer: MEDICAID

## 2023-04-21 ENCOUNTER — PATIENT MESSAGE (OUTPATIENT)
Dept: OBSTETRICS AND GYNECOLOGY | Facility: CLINIC | Age: 23
End: 2023-04-21
Payer: MEDICAID

## 2023-04-21 ENCOUNTER — TELEPHONE (OUTPATIENT)
Dept: OBSTETRICS AND GYNECOLOGY | Facility: CLINIC | Age: 23
End: 2023-04-21
Payer: MEDICAID

## 2023-04-21 DIAGNOSIS — R30.0 DYSURIA: Primary | ICD-10-CM

## 2023-04-21 RX ORDER — NITROFURANTOIN 25; 75 MG/1; MG/1
100 CAPSULE ORAL 2 TIMES DAILY
Qty: 10 CAPSULE | Refills: 0 | Status: SHIPPED | OUTPATIENT
Start: 2023-04-21 | End: 2023-04-26

## 2023-04-21 NOTE — TELEPHONE ENCOUNTER
----- Message from Stephanie Naranjo sent at 4/21/2023 10:21 AM CDT -----  Regarding: PATIENT CALL BACK  Contact: PATIENT  Name of Who is Calling: LAUREN YUEN [[id number]] Pau ()       What is the request in detail: Patient's  is requesting a call back to discuss patient's post- partum. States patient has not been having the will to take a shower and he is worried about her health. Also states patient has been having pain in her lower legs. Please advise!        Can the clinic reply by [facility]: NO        What Number to Call Back if not in [facility]: [phone]

## 2023-04-24 ENCOUNTER — PATIENT MESSAGE (OUTPATIENT)
Dept: OBSTETRICS AND GYNECOLOGY | Facility: CLINIC | Age: 23
End: 2023-04-24
Payer: MEDICAID

## 2023-04-24 DIAGNOSIS — R30.0 DYSURIA: Primary | ICD-10-CM

## 2023-04-24 RX ORDER — SERTRALINE HYDROCHLORIDE 50 MG/1
150 TABLET, FILM COATED ORAL DAILY
Qty: 90 TABLET | Refills: 6 | Status: SHIPPED | OUTPATIENT
Start: 2023-04-24 | End: 2023-07-13 | Stop reason: SDUPTHER

## 2023-04-27 ENCOUNTER — NURSE TRIAGE (OUTPATIENT)
Dept: ADMINISTRATIVE | Facility: CLINIC | Age: 23
End: 2023-04-27
Payer: MEDICAID

## 2023-04-27 ENCOUNTER — TELEPHONE (OUTPATIENT)
Dept: LACTATION | Facility: CLINIC | Age: 23
End: 2023-04-27
Payer: MEDICAID

## 2023-04-27 NOTE — TELEPHONE ENCOUNTER
Ruth Ann Dickson's  states he spoke to OB and was told to bring Ruth Ann to ED now for physician eval or wait until appt tomorrow because of postpartum depression. Randolph states he needs Ruth Ann to be able to function as he is returning to work soon. States Ruth Ann does not want to go to ED if she will be admitted to psych facility. Triage offered and accepted. Ruth Ann reports feeling depressed and unable to complete normal activities. States her only support is her . Denies suicidal and homicidal ideations at this time. Ruth Ann states she has been asking for an increase in Zoloft since she was 25 weeks pregnant and states medication was recently increased to 3 tablets but states she has only been taking 2 tablets. Voices foul language and frustration toward system and OB/L&D depts. Also reports pain in back since epidural and unable to stand for more than 10 mins since epidural. Advised Randolph and Ruth Ann to go to nearest ED now for physician yang. V/u. Randolph states cousin Андрей is on his way to bring them ED now.   Reason for Disposition   Depression and unable to do any of normal activities (e.g., self care, care of baby).    Additional Information   Negative: Suicide thoughts, threats, attempts, or questions   Negative: Thinking of hurting the baby   Negative: Very strange or confused behavior    Protocols used: Postpartum - Depression-A-OH

## 2023-04-27 NOTE — TELEPHONE ENCOUNTER
Lactation Telephone:    MIN called on behalf of mother. He reports mother is suffering from postpartum depression. FOB reports she does not have feelings of harming herself or her baby (others).    Mother is pumping for baby and FORAJNI is feeding baby EBM. Infant takes 3-4 oz. Every 3-4 hours.  5 wets and 5 dirties reported today. He reports she is not pumping often anymore because she wont get out of the bed due to her metal health. Her depression medication has not been increased and they were told to reevaluate at next follow up appointment in May. MIN plans to formula feed when he runs out of EBM but hasn't had to do that yet. He states hes taken off the whole month to care for baby because mother cant. He has to go back to work.    Spoke with Dr. Funes gave her report on patient and transferred call with FORAJNI to Doctor for further guidance and to go to the ER for evaluation.

## 2023-04-28 ENCOUNTER — PATIENT MESSAGE (OUTPATIENT)
Dept: OBSTETRICS AND GYNECOLOGY | Facility: CLINIC | Age: 23
End: 2023-04-28

## 2023-04-28 ENCOUNTER — OFFICE VISIT (OUTPATIENT)
Dept: OBSTETRICS AND GYNECOLOGY | Facility: CLINIC | Age: 23
End: 2023-04-28
Payer: MEDICAID

## 2023-04-28 ENCOUNTER — TELEPHONE (OUTPATIENT)
Dept: OBSTETRICS AND GYNECOLOGY | Facility: CLINIC | Age: 23
End: 2023-04-28
Payer: MEDICAID

## 2023-04-28 DIAGNOSIS — Z98.891 S/P CESAREAN SECTION: ICD-10-CM

## 2023-04-28 DIAGNOSIS — M25.551 BILATERAL HIP PAIN: ICD-10-CM

## 2023-04-28 DIAGNOSIS — M25.552 BILATERAL HIP PAIN: ICD-10-CM

## 2023-04-28 PROCEDURE — 1111F PR DISCHARGE MEDS RECONCILED W/ CURRENT OUTPATIENT MED LIST: ICD-10-PCS | Mod: CPTII,95,, | Performed by: ADVANCED PRACTICE MIDWIFE

## 2023-04-28 PROCEDURE — 99213 OFFICE O/P EST LOW 20 MIN: CPT | Mod: 95,,, | Performed by: ADVANCED PRACTICE MIDWIFE

## 2023-04-28 PROCEDURE — 99213 PR OFFICE/OUTPT VISIT, EST, LEVL III, 20-29 MIN: ICD-10-PCS | Mod: 95,,, | Performed by: ADVANCED PRACTICE MIDWIFE

## 2023-04-28 PROCEDURE — 1111F DSCHRG MED/CURRENT MED MERGE: CPT | Mod: CPTII,95,, | Performed by: ADVANCED PRACTICE MIDWIFE

## 2023-04-28 RX ORDER — ONDANSETRON 4 MG/1
4 TABLET, ORALLY DISINTEGRATING ORAL 2 TIMES DAILY
Qty: 30 TABLET | Refills: 1 | Status: SHIPPED | OUTPATIENT
Start: 2023-04-28 | End: 2023-05-16 | Stop reason: SDUPTHER

## 2023-04-28 NOTE — PROGRESS NOTES
S/w patient with  on virtual visit   She reports that her back/leg and really full body pain is affecting her ADLs. She hurts when she walks or is upright. Doesn't really want to be on medication for it. Discussed muscle vs nerve pain and she is willing to try physical therapy.   I believe her physical pain may be contributing to her mental state in the postpartum period. I previously increased her zoloft dosage but patient has not tried it yet. PT consult placed.   Also, she reports that she is having nausea episodes with hand expression of breast milk. She is producing a good amount and the nausea comes on right when she starts expressing and then decreases. Will send zofran to try to relieve but can be a part of hormones with breastfeeding.   Pt will follow up in May for her PP visit with physician d/t s/p  section but will keep me updated on progress with PT and mental status.

## 2023-04-28 NOTE — TELEPHONE ENCOUNTER
Spoke with pt to follow up with her on depression. Per patient she did not go the the ED as directed because although she is feeling depressed that is not the reason she did want to get out of the bed on yesterday. She stated that she was having back pain that she has been experiencing since delivery. Patient stated that she is not experiencing any suicidal or homicidal ideations. She stated that her medication (Zoloft) was recently increased by Etienne Blanchard CNM to to 150mg which is helping a little bit. Patient continued the conversation stating that her back pain is a concern. Offered patient an in office visit but per pt she uses medicaid transportation and would not be able to come in today. Per Etienne rinaldi to offer patient virtual visit. Patient scheduled and was advised of time. She verbalized understanding.

## 2023-04-29 DIAGNOSIS — R52 COMPLAINTS OF TOTAL BODY PAIN: Primary | ICD-10-CM

## 2023-05-02 ENCOUNTER — PATIENT MESSAGE (OUTPATIENT)
Dept: OBSTETRICS AND GYNECOLOGY | Facility: CLINIC | Age: 23
End: 2023-05-02
Payer: MEDICAID

## 2023-05-03 ENCOUNTER — TELEPHONE (OUTPATIENT)
Dept: OBSTETRICS AND GYNECOLOGY | Facility: CLINIC | Age: 23
End: 2023-05-03
Payer: MEDICAID

## 2023-05-03 NOTE — TELEPHONE ENCOUNTER
Attempted to contact patient, no answer. Left patient voice mail to return call to clinic.    Regarding schedule an appointment to come in office to see  today at the [facility] for 2:15pm.

## 2023-05-10 ENCOUNTER — TELEPHONE (OUTPATIENT)
Dept: OBSTETRICS AND GYNECOLOGY | Facility: CLINIC | Age: 23
End: 2023-05-10
Payer: MEDICAID

## 2023-05-10 NOTE — TELEPHONE ENCOUNTER
----- Message from Pam English sent at 5/10/2023 12:19 PM CDT -----  Contact: Ruth Ann Vallecillo is requesting a call back in regards to medication question. Please call her  back at [phone]    Thanks  CF

## 2023-05-10 NOTE — TELEPHONE ENCOUNTER
Called patient and she is asking if she needs to continue Lovenox.  She thought once she left the hospital she no longer needed it.  Advised patient message would be sent to provider.

## 2023-05-10 NOTE — TELEPHONE ENCOUNTER
----- Message from Humera Sigala sent at 5/10/2023  1:42 PM CDT -----  Pt is requesting a call back concerning the call she just missed. Call back at [phone]  Thx jm

## 2023-05-10 NOTE — TELEPHONE ENCOUNTER
Attempted to contact patient. No answer, unable to leave voice mail.     Regarding pt need to come in office to see PA or NP

## 2023-05-11 ENCOUNTER — PATIENT MESSAGE (OUTPATIENT)
Dept: OBSTETRICS AND GYNECOLOGY | Facility: CLINIC | Age: 23
End: 2023-05-11
Payer: MEDICAID

## 2023-05-12 ENCOUNTER — PATIENT MESSAGE (OUTPATIENT)
Dept: OBSTETRICS AND GYNECOLOGY | Facility: CLINIC | Age: 23
End: 2023-05-12
Payer: MEDICAID

## 2023-05-16 ENCOUNTER — TELEPHONE (OUTPATIENT)
Dept: PHYSICAL MEDICINE AND REHAB | Facility: CLINIC | Age: 23
End: 2023-05-16
Payer: MEDICAID

## 2023-05-16 ENCOUNTER — POSTPARTUM VISIT (OUTPATIENT)
Dept: OBSTETRICS AND GYNECOLOGY | Facility: CLINIC | Age: 23
End: 2023-05-16
Payer: MEDICAID

## 2023-05-16 ENCOUNTER — PATIENT MESSAGE (OUTPATIENT)
Dept: PHYSICAL MEDICINE AND REHAB | Facility: CLINIC | Age: 23
End: 2023-05-16
Payer: MEDICAID

## 2023-05-16 VITALS
DIASTOLIC BLOOD PRESSURE: 60 MMHG | HEIGHT: 68 IN | WEIGHT: 293 LBS | BODY MASS INDEX: 44.41 KG/M2 | SYSTOLIC BLOOD PRESSURE: 118 MMHG

## 2023-05-16 DIAGNOSIS — Z30.011 ENCOUNTER FOR INITIAL PRESCRIPTION OF CONTRACEPTIVE PILLS: ICD-10-CM

## 2023-05-16 DIAGNOSIS — Z98.891 S/P CESAREAN SECTION: ICD-10-CM

## 2023-05-16 PROCEDURE — 99213 OFFICE O/P EST LOW 20 MIN: CPT | Mod: PBBFAC | Performed by: OBSTETRICS & GYNECOLOGY

## 2023-05-16 PROCEDURE — 0503F POSTPARTUM CARE VISIT: CPT | Mod: ,,, | Performed by: OBSTETRICS & GYNECOLOGY

## 2023-05-16 PROCEDURE — 99999 PR PBB SHADOW E&M-EST. PATIENT-LVL III: ICD-10-PCS | Mod: PBBFAC,,, | Performed by: OBSTETRICS & GYNECOLOGY

## 2023-05-16 PROCEDURE — 99999 PR PBB SHADOW E&M-EST. PATIENT-LVL III: CPT | Mod: PBBFAC,,, | Performed by: OBSTETRICS & GYNECOLOGY

## 2023-05-16 PROCEDURE — 0503F PR POSTPARTUM CARE VISIT: ICD-10-PCS | Mod: ,,, | Performed by: OBSTETRICS & GYNECOLOGY

## 2023-05-16 RX ORDER — NORETHINDRONE ACETATE AND ETHINYL ESTRADIOL 1MG-20(21)
1 KIT ORAL DAILY
Qty: 90 TABLET | Refills: 3 | Status: SHIPPED | OUTPATIENT
Start: 2023-05-16 | End: 2023-06-12

## 2023-05-16 NOTE — PROGRESS NOTES
Subjective:       Patient ID: Ruth Ann Oropeza is a 22 y.o. female.    Chief Complaint:  Postpartum Follow-up      History of Present Illness  HPI  Postoperative Follow-up  Patient presents to the clinic 5 weeks status post  for  failure to progress . Eating a regular diet without difficulty. Bowel movements are normal. The patient is not having any pain.  Has had intercourse--used a condom--denies condom break  Bottle feeding  Emotionally ok--seeing a therapist, taking zoloft 150mg daily;  Denies vaginal bleeding  C/o lower back pain    Took bp meds this am    GYN & OB History  No LMP recorded.   Date of Last Pap: No result found    OB History    Para Term  AB Living   1 1 1     1   SAB IAB Ectopic Multiple Live Births         0 1      # Outcome Date GA Lbr David/2nd Weight Sex Delivery Anes PTL Lv   1 Term 23 38w2d  3.31 kg (7 lb 4.8 oz) M CS-LTranv EPI N ZULY      Complications: Fetal Intolerance       Review of Systems  Review of Systems   Musculoskeletal:  Positive for back pain and leg pain.   Psychiatric/Behavioral:  Positive for depression.    All other systems reviewed and are negative.        Objective:      Physical Exam:   Constitutional: She is oriented to person, place, and time. She appears well-developed and well-nourished.     Eyes: Pupils are equal, round, and reactive to light. Conjunctivae and EOM are normal.      Pulmonary/Chest: Effort normal. Right breast exhibits no mass, no nipple discharge, no skin change and no tenderness. Left breast exhibits no mass, no nipple discharge, no skin change and no tenderness. Breasts are symmetrical.        Abdominal: Soft. She exhibits abdominal incision (well  healed).     Genitourinary:    Vagina, uterus, right adnexa, left adnexa and rectum normal.      Pelvic exam was performed with patient supine.   The external female genitalia was normal.   Labial bartholins normal.Cervix is normal. Right adnexum displays no mass and no  tenderness. Left adnexum displays no mass and no tenderness. No erythema,  no vaginal discharge, bleeding, rectocele, cystocele or unspecified prolapse of vaginal walls in the vagina. Vagina was moist.Uerus contour normal  Normal urethral meatus.Urethra findings: no urethral massBladder findings: no bladder distention and no bladder tenderness          Musculoskeletal: Normal range of motion and moves all extremeties.       Neurological: She is alert and oriented to person, place, and time.    Skin: Skin is warm.    Psychiatric: She has a normal mood and affect. Her behavior is normal.         Assessment:        1. Postpartum care following  delivery    2. Encounter for initial prescription of contraceptive pills    3. S/P  section    4. Postpartum depression               Plan:      Released from ob care  Reviewed contraceptive options--ocp, depo, nuva ring, nexplanon, iud, patch  Reviewed uses of each, risks and benefits.  Pt elects trial of  ocp; rx sent; safe sex; start ocp today  Ww exam/pap due in 1 yr  Cotninue f/u with psych; f/u with pcp regarding bp, continue labetalol 300mg tid for now

## 2023-05-16 NOTE — TELEPHONE ENCOUNTER
Returned patient's call. Patient inappropriately scheduled with PMR. Referral in for Neurology and PT/OT. I informed patient about who she was actually referred to. Patient was upset about being scheduled wrong initially, but I told her that we would get her to the appropriate provider. Patient was appreciative and I provided my contact information if she had any problems, and I would try to help as best as I could. Message sent to Neurology for scheduling of referral.  Karmen Vivas RN

## 2023-05-16 NOTE — TELEPHONE ENCOUNTER
Reached out to patient from Dr. Short's schedule. Patient is possibly scheduled incorrectly and needs to be seen by IPM. Attempted to call patient, no answer, VM full. Sent patient message in [email or web address].  Karmen Vivas RN

## 2023-05-22 ENCOUNTER — OFFICE VISIT (OUTPATIENT)
Dept: PRIMARY CARE CLINIC | Facility: CLINIC | Age: 23
End: 2023-05-22
Payer: MEDICAID

## 2023-05-22 ENCOUNTER — PATIENT OUTREACH (OUTPATIENT)
Dept: ADMINISTRATIVE | Facility: OTHER | Age: 23
End: 2023-05-22
Payer: MEDICAID

## 2023-05-22 VITALS
SYSTOLIC BLOOD PRESSURE: 133 MMHG | BODY MASS INDEX: 44.41 KG/M2 | OXYGEN SATURATION: 97 % | WEIGHT: 293 LBS | HEIGHT: 68 IN | HEART RATE: 96 BPM | TEMPERATURE: 98 F | DIASTOLIC BLOOD PRESSURE: 79 MMHG

## 2023-05-22 DIAGNOSIS — G62.9 NEUROPATHY: ICD-10-CM

## 2023-05-22 DIAGNOSIS — M54.15 RADICULOPATHY OF THORACOLUMBAR REGION: Primary | ICD-10-CM

## 2023-05-22 PROCEDURE — 3078F DIAST BP <80 MM HG: CPT | Mod: CPTII,,, | Performed by: FAMILY MEDICINE

## 2023-05-22 PROCEDURE — 3008F PR BODY MASS INDEX (BMI) DOCUMENTED: ICD-10-PCS | Mod: CPTII,,, | Performed by: FAMILY MEDICINE

## 2023-05-22 PROCEDURE — 3075F PR MOST RECENT SYSTOLIC BLOOD PRESS GE 130-139MM HG: ICD-10-PCS | Mod: CPTII,,, | Performed by: FAMILY MEDICINE

## 2023-05-22 PROCEDURE — 99999 PR PBB SHADOW E&M-EST. PATIENT-LVL IV: CPT | Mod: PBBFAC,,, | Performed by: FAMILY MEDICINE

## 2023-05-22 PROCEDURE — 3075F SYST BP GE 130 - 139MM HG: CPT | Mod: CPTII,,, | Performed by: FAMILY MEDICINE

## 2023-05-22 PROCEDURE — 99204 PR OFFICE/OUTPT VISIT, NEW, LEVL IV, 45-59 MIN: ICD-10-PCS | Mod: S$PBB,,, | Performed by: FAMILY MEDICINE

## 2023-05-22 PROCEDURE — 99999 PR PBB SHADOW E&M-EST. PATIENT-LVL IV: ICD-10-PCS | Mod: PBBFAC,,, | Performed by: FAMILY MEDICINE

## 2023-05-22 PROCEDURE — 3008F BODY MASS INDEX DOCD: CPT | Mod: CPTII,,, | Performed by: FAMILY MEDICINE

## 2023-05-22 PROCEDURE — 1159F PR MEDICATION LIST DOCUMENTED IN MEDICAL RECORD: ICD-10-PCS | Mod: CPTII,,, | Performed by: FAMILY MEDICINE

## 2023-05-22 PROCEDURE — 99214 OFFICE O/P EST MOD 30 MIN: CPT | Mod: PBBFAC,PN | Performed by: FAMILY MEDICINE

## 2023-05-22 PROCEDURE — 3078F PR MOST RECENT DIASTOLIC BLOOD PRESSURE < 80 MM HG: ICD-10-PCS | Mod: CPTII,,, | Performed by: FAMILY MEDICINE

## 2023-05-22 PROCEDURE — 99204 OFFICE O/P NEW MOD 45 MIN: CPT | Mod: S$PBB,,, | Performed by: FAMILY MEDICINE

## 2023-05-22 PROCEDURE — 1159F MED LIST DOCD IN RCRD: CPT | Mod: CPTII,,, | Performed by: FAMILY MEDICINE

## 2023-05-22 RX ORDER — PREGABALIN 100 MG/1
100 CAPSULE ORAL 2 TIMES DAILY PRN
Qty: 60 CAPSULE | Refills: 1 | Status: SHIPPED | OUTPATIENT
Start: 2023-05-22 | End: 2023-06-12

## 2023-05-22 NOTE — PROGRESS NOTES
Subjective:       Patient ID: Ruth Ann Oropeza is a 22 y.o. female.    Chief Complaint: Establish Care and Back Pain, nerve damage      History of Present Illness:   Ruth Ann Oropeza 22 y.o. female presents today with  Patient here for the first time. Presented with baby and significant other.    Reports that since she received epidural injection during labor 6 weeks ago, she has been experiencing shooting pain to her thoracic and low back that radiates down to her legs, asso with numbness and tingling to all 4 extremities. Sxs is waxing and waning but there all the time. She has reported the sxs to her OB. I have reviewed the OB progress note and saw that referral has already been placed for PT and Neuro. She is waiting to be scheduled. She has not been tried on any medication. Denies fever. She is not breast feeding.      She has family history of drug abuse and cannot take opioids.  Past Medical History:   Diagnosis Date    Asthma     Attention deficit hyperactivity disorder (ADHD) 3/30/2023    Chlamydia infection affecting pregnancy in third trimester 2/13/2023    + in triage 2/9/23- Azithromycin sent  PUNEET neg    Gestational hypertension without significant proteinuria during pregnancy in third trimester, antepartum     Iron deficiency anemia secondary to inadequate dietary iron intake 4/7/2023    Non-reassuring fetal heart rate with late deceleration 4/7/2023    Postpartum depression 4/28/2023     Family History   Problem Relation Age of Onset    Hypertension Father      Social History     Socioeconomic History    Marital status:    Tobacco Use    Smoking status: Every Day     Types: Cigarettes   Substance and Sexual Activity    Alcohol use: Never    Drug use: Never    Sexual activity: Yes     Partners: Male     Social Determinants of Health     Financial Resource Strain: Low Risk     Difficulty of Paying Living Expenses: Not hard at all   Food Insecurity: No Food Insecurity    Worried About  Running Out of Food in the Last Year: Never true    Ran Out of Food in the Last Year: Never true   Transportation Needs: No Transportation Needs    Lack of Transportation (Medical): No    Lack of Transportation (Non-Medical): No   Physical Activity: Insufficiently Active    Days of Exercise per Week: 1 day    Minutes of Exercise per Session: 20 min   Stress: No Stress Concern Present    Feeling of Stress : Only a little   Social Connections: Moderately Integrated    Frequency of Communication with Friends and Family: More than three times a week    Frequency of Social Gatherings with Friends and Family: More than three times a week    Attends [employer]ah[employer] Services: More than 4 times per year    Active Member of Clubs or Organizations: No    Attends Club or Organization Meetings: Never    Marital Status:    Housing Stability: Low Risk     Unable to Pay for Housing in the Last Year: No    Number of Places Lived in the Last Year: 1    Unstable Housing in the Last Year: No     Outpatient Encounter Medications as of 5/22/2023   Medication Sig Dispense Refill    albuterol (PROVENTIL/VENTOLIN HFA) 90 mcg/actuation inhaler Inhale into the lungs.      ferrous sulfate 325 (65 FE) MG EC tablet Take 1 tablet (325 mg total) by mouth once daily. 30 tablet 6    labetaloL (NORMODYNE) 300 MG tablet Take 1 tablet (300 mg total) by mouth every 8 (eight) hours. 90 tablet 11    pantoprazole (PROTONIX) 40 MG tablet Take 1 tablet (40 mg total) by mouth once daily. 30 tablet 1    sertraline (ZOLOFT) 50 MG tablet Take 3 tablets (150 mg total) by mouth once daily. 90 tablet 6    norethindrone-ethinyl estradiol (JUNEL FE 1/20) 1 mg-20 mcg (21)/75 mg (7) per tablet Take 1 tablet by mouth once daily. (Patient not taking: Reported on 5/22/2023) 90 tablet 3    pregabalin (LYRICA) 100 MG capsule Take 1 capsule (100 mg total) by mouth 2 (two) times daily as needed (neuropathic pain). 60 capsule 1     No facility-administered encounter  "medications on file as of 5/22/2023.       Review of Systems    Review of Systems      A complete 10 point ROS was completed and are positive as per above HPI.    Otherwise negative for fever, diplopia, chest pain, shortness of breath, vomiting, blood in urine, skin rash, seizures and unusual bleeding.     Objective:      /79 (BP Location: Right arm, Patient Position: Sitting, BP Method: Medium (Automatic))   Pulse 96   Temp 98.4 °F (36.9 °C) (Oral)   Ht 5' 8" (1.727 m)   Wt (!) 168.1 kg (370 lb 9.6 oz)   SpO2 97%   Breastfeeding No   BMI 56.35 kg/m²   Physical Exam  Musculoskeletal:      Thoracic back: Tenderness (T11/T12) present.      Lumbar back: Tenderness present.        Back:       Comments: Diffuse tenderness, no warmth,       Results for orders placed or performed during the hospital encounter of 04/04/23   CBC Auto Differential   Result Value Ref Range    WBC 10.97 3.90 - 12.70 K/uL    RBC 4.28 4.00 - 5.40 M/uL    Hemoglobin 11.0 (L) 12.0 - 16.0 g/dL    Hematocrit 34.2 (L) 37.0 - 48.5 %    MCV 80 (L) 82 - 98 fL    MCH 25.7 (L) 27.0 - 31.0 pg    MCHC 32.2 32.0 - 36.0 g/dL    RDW 14.4 11.5 - 14.5 %    Platelets 256 150 - 450 K/uL    MPV 11.8 9.2 - 12.9 fL    Immature Granulocytes 0.5 0.0 - 0.5 %    Gran # (ANC) 8.2 (H) 1.8 - 7.7 K/uL    Immature Grans (Abs) 0.05 (H) 0.00 - 0.04 K/uL    Lymph # 2.2 1.0 - 4.8 K/uL    Mono # 0.4 0.3 - 1.0 K/uL    Eos # 0.1 0.0 - 0.5 K/uL    Baso # 0.03 0.00 - 0.20 K/uL    nRBC 0 0 /100 WBC    Gran % 74.7 (H) 38.0 - 73.0 %    Lymph % 19.8 18.0 - 48.0 %    Mono % 3.6 (L) 4.0 - 15.0 %    Eosinophil % 1.1 0.0 - 8.0 %    Basophil % 0.3 0.0 - 1.9 %    Differential Method Automated    Comprehensive Metabolic Panel   Result Value Ref Range    Sodium 139 136 - 145 mmol/L    Potassium 4.0 3.5 - 5.1 mmol/L    Chloride 110 95 - 110 mmol/L    CO2 19 (L) 23 - 29 mmol/L    Glucose 98 70 - 110 mg/dL    BUN 11 6 - 20 mg/dL    Creatinine 0.8 0.5 - 1.4 mg/dL    Calcium 8.9 8.7 - 10.5 " mg/dL    Total Protein 6.3 6.0 - 8.4 g/dL    Albumin 2.4 (L) 3.5 - 5.2 g/dL    Total Bilirubin 0.2 0.1 - 1.0 mg/dL    Alkaline Phosphatase 121 55 - 135 U/L    AST 11 10 - 40 U/L    ALT 10 10 - 44 U/L    Anion Gap 10 8 - 16 mmol/L    eGFR >60 >60 mL/min/1.73 m^2   Protein/Creatinine Ratio, Urine   Result Value Ref Range    Protein, Urine Random 20 (H) 0 - 15 mg/dL    Creatinine, Urine 168.2 15.0 - 325.0 mg/dL    Prot/Creat Ratio, Urine 0.12 0.00 - 0.20   CBC auto differential   Result Value Ref Range    WBC 7.52 3.90 - 12.70 K/uL    RBC 3.56 (L) 4.00 - 5.40 M/uL    Hemoglobin 9.2 (L) 12.0 - 16.0 g/dL    Hematocrit 29.8 (L) 37.0 - 48.5 %    MCV 84 82 - 98 fL    MCH 25.8 (L) 27.0 - 31.0 pg    MCHC 30.9 (L) 32.0 - 36.0 g/dL    RDW 14.8 (H) 11.5 - 14.5 %    Platelets 191 150 - 450 K/uL    MPV 11.3 9.2 - 12.9 fL    Immature Granulocytes 0.4 0.0 - 0.5 %    Gran # (ANC) 5.0 1.8 - 7.7 K/uL    Immature Grans (Abs) 0.03 0.00 - 0.04 K/uL    Lymph # 1.8 1.0 - 4.8 K/uL    Mono # 0.4 0.3 - 1.0 K/uL    Eos # 0.2 0.0 - 0.5 K/uL    Baso # 0.05 0.00 - 0.20 K/uL    nRBC 0 0 /100 WBC    Gran % 66.1 38.0 - 73.0 %    Lymph % 24.1 18.0 - 48.0 %    Mono % 5.5 4.0 - 15.0 %    Eosinophil % 3.2 0.0 - 8.0 %    Basophil % 0.7 0.0 - 1.9 %    Differential Method Automated    Type & Screen   Result Value Ref Range    Group & Rh O POS     Indirect Mylene NEG     Specimen Outdate 04/07/2023 23:59      Assessment:       1. Radiculopathy of thoracolumbar region    2. Neuropathy        Plan:   Radiculopathy of thoracolumbar region  Comments:  xray to screen for any sign of infection/abscess, however if sxs persists and xray is neg, MRI then.  Orders:  -     X-Ray Thoracic Spine AP Lateral; Future; Expected date: 05/23/2023  -     X-Ray Lumbar Spine Ap And Lateral; Future; Expected date: 05/23/2023    Neuropathy  Comments:  Persistent sxs, Atypical distribution given the inciting event, NCT and xray of thoracic and lumbar. Reached out to OB via sec  chat-OB to cont PT/Neuro plan.  Orders:  -     pregabalin (LYRICA) 100 MG capsule; Take 1 capsule (100 mg total) by mouth 2 (two) times daily as needed (neuropathic pain).  Dispense: 60 capsule; Refill: 1  -     EMG W/ ULTRASOUND AND NERVE CONDUCTION TEST 4 Extremities; Future    I have reviewed all of the patient's clinical history available in care everywhere and Epic and have utilized this in my evaluation and management recommendations today.   Treatment options and alternatives were discussed with the patient. Patient was given ample time to ask questions. All questions were answered. Voices understanding and acceptance of this advice. Will call back if any further questions or concerns.    Micheline Rosas MD

## 2023-05-22 NOTE — PROGRESS NOTES
CHW - Initial Contact    This Community Health Worker completed the Social Determinant of Health questionnaire with patient during clinic visit today.    Pt identified barriers of most importance are: Patient shared no barriers at this time.   Referrals to community agencies completed with patient consent outside of [facility] include: No outside referrals at this time.  Referrals were put through [facility] - no  Support and Services: None  Other information discussed the patient needs help with: No other information was discussed.   Follow up required: Yes  Follow-up Outreach - Due: 5/29/2023

## 2023-05-24 ENCOUNTER — TELEPHONE (OUTPATIENT)
Dept: PRIMARY CARE CLINIC | Facility: CLINIC | Age: 23
End: 2023-05-24
Payer: MEDICAID

## 2023-05-24 NOTE — TELEPHONE ENCOUNTER
Spoke with Ophelia in the Neurology department, she states she will send a message to Ana to schedule patient for EMG US with Nerve conduction.

## 2023-05-30 ENCOUNTER — TELEPHONE (OUTPATIENT)
Dept: PHYSICAL MEDICINE AND REHAB | Facility: CLINIC | Age: 23
End: 2023-05-30
Payer: MEDICAID

## 2023-05-30 NOTE — TELEPHONE ENCOUNTER
Patient previously canceled from Dr. Short's schedule for being incorrectly scheduled. Patient back on schedule for back pain. Patient having xray done tomorrow, 5/31/23. Told patient we will wait for results and go from there, to be expecting phone call from me tomorrow or next day. Patient v/u.  RD

## 2023-05-31 ENCOUNTER — TELEPHONE (OUTPATIENT)
Dept: PHYSICAL MEDICINE AND REHAB | Facility: CLINIC | Age: 23
End: 2023-05-31
Payer: MEDICAID

## 2023-05-31 ENCOUNTER — HOSPITAL ENCOUNTER (OUTPATIENT)
Dept: RADIOLOGY | Facility: HOSPITAL | Age: 23
Discharge: HOME | End: 2023-05-31
Attending: FAMILY MEDICINE
Payer: MEDICAID

## 2023-05-31 ENCOUNTER — TELEPHONE (OUTPATIENT)
Dept: PRIMARY CARE CLINIC | Facility: CLINIC | Age: 23
End: 2023-05-31
Payer: MEDICAID

## 2023-05-31 DIAGNOSIS — M54.15 RADICULOPATHY OF THORACOLUMBAR REGION: ICD-10-CM

## 2023-05-31 DIAGNOSIS — M54.15 RADICULOPATHY OF THORACOLUMBAR REGION: Primary | ICD-10-CM

## 2023-05-31 PROCEDURE — 72100 XR LUMBAR SPINE AP AND LATERAL: ICD-10-PCS | Mod: 26,,, | Performed by: RADIOLOGY

## 2023-05-31 PROCEDURE — 72100 X-RAY EXAM L-S SPINE 2/3 VWS: CPT | Mod: 26,,, | Performed by: RADIOLOGY

## 2023-05-31 PROCEDURE — 72100 X-RAY EXAM L-S SPINE 2/3 VWS: CPT | Mod: TC

## 2023-05-31 NOTE — TELEPHONE ENCOUNTER
Patient had xray L spine completed today. Xray negative. Called patient and told her we can keep her appointment with Dr. Short. Patient v/u.  RD

## 2023-05-31 NOTE — PROGRESS NOTES
Your lumbar spine xray is normal without any acute findings.  After the nerve conduction test, start physical therapy-It will help you.

## 2023-05-31 NOTE — TELEPHONE ENCOUNTER
Patient called regarding x-ray. Patient informed of results and provider orders, patient voiced understanding.  ----- Message from Micheline Rosas MD sent at 5/31/2023  3:55 PM CDT -----  Your lumbar spine xray is normal without any acute findings.  After the nerve conduction test, start physical therapy-It will help you.

## 2023-06-01 ENCOUNTER — PATIENT OUTREACH (OUTPATIENT)
Dept: ADMINISTRATIVE | Facility: OTHER | Age: 23
End: 2023-06-01
Payer: MEDICAID

## 2023-06-01 ENCOUNTER — TELEPHONE (OUTPATIENT)
Dept: PHYSICAL MEDICINE AND REHAB | Facility: CLINIC | Age: 23
End: 2023-06-01
Payer: MEDICAID

## 2023-06-01 NOTE — TELEPHONE ENCOUNTER
----- Message from Sharita Leigh sent at 6/1/2023  1:34 PM CDT -----  Contact: self [phone]  Patient called in this afternoon screaming she is supposed to have a nerve induction test done but it do not see in orders in they system for that, or I may be overlooking it. Please call back [phone]

## 2023-06-01 NOTE — PROGRESS NOTES
CHW - Follow Up    This Community Health Worker completed a follow up visit with patient via telephone today.  Pt reported: No new information.  Community Health Worker provided: Patient with a follow up call to make sure she's doing well.  Follow up required: No  No future outreach task assigned      CHW - Case Closure    This Community Health Worker spoke to patient via telephone today.   Pt reported: No new information.  Pt denied any additional needs at this time and agrees with episode closure at this time.  Provided patient with Community Health Worker's contact information and encouraged her to contact this Community Health Worker if additional needs arise.

## 2023-06-12 ENCOUNTER — OFFICE VISIT (OUTPATIENT)
Dept: PHYSICAL MEDICINE AND REHAB | Facility: CLINIC | Age: 23
End: 2023-06-12
Payer: MEDICAID

## 2023-06-12 VITALS
BODY MASS INDEX: 44.41 KG/M2 | WEIGHT: 293 LBS | HEIGHT: 68 IN | SYSTOLIC BLOOD PRESSURE: 156 MMHG | DIASTOLIC BLOOD PRESSURE: 93 MMHG | RESPIRATION RATE: 14 BRPM | HEART RATE: 84 BPM

## 2023-06-12 DIAGNOSIS — M53.3 SACROILIAC JOINT DYSFUNCTION OF BOTH SIDES: ICD-10-CM

## 2023-06-12 DIAGNOSIS — R29.898 WEAKNESS OF BOTH HIPS: Primary | ICD-10-CM

## 2023-06-12 PROCEDURE — 99204 PR OFFICE/OUTPT VISIT, NEW, LEVL IV, 45-59 MIN: ICD-10-PCS | Mod: S$PBB,,, | Performed by: PHYSICAL MEDICINE & REHABILITATION

## 2023-06-12 PROCEDURE — 3077F SYST BP >= 140 MM HG: CPT | Mod: CPTII,,, | Performed by: PHYSICAL MEDICINE & REHABILITATION

## 2023-06-12 PROCEDURE — 3008F PR BODY MASS INDEX (BMI) DOCUMENTED: ICD-10-PCS | Mod: CPTII,,, | Performed by: PHYSICAL MEDICINE & REHABILITATION

## 2023-06-12 PROCEDURE — 1159F PR MEDICATION LIST DOCUMENTED IN MEDICAL RECORD: ICD-10-PCS | Mod: CPTII,,, | Performed by: PHYSICAL MEDICINE & REHABILITATION

## 2023-06-12 PROCEDURE — 1160F PR REVIEW ALL MEDS BY PRESCRIBER/CLIN PHARMACIST DOCUMENTED: ICD-10-PCS | Mod: CPTII,,, | Performed by: PHYSICAL MEDICINE & REHABILITATION

## 2023-06-12 PROCEDURE — 1160F RVW MEDS BY RX/DR IN RCRD: CPT | Mod: CPTII,,, | Performed by: PHYSICAL MEDICINE & REHABILITATION

## 2023-06-12 PROCEDURE — 3080F DIAST BP >= 90 MM HG: CPT | Mod: CPTII,,, | Performed by: PHYSICAL MEDICINE & REHABILITATION

## 2023-06-12 PROCEDURE — 3080F PR MOST RECENT DIASTOLIC BLOOD PRESSURE >= 90 MM HG: ICD-10-PCS | Mod: CPTII,,, | Performed by: PHYSICAL MEDICINE & REHABILITATION

## 2023-06-12 PROCEDURE — 99204 OFFICE O/P NEW MOD 45 MIN: CPT | Mod: S$PBB,,, | Performed by: PHYSICAL MEDICINE & REHABILITATION

## 2023-06-12 PROCEDURE — 99999 PR PBB SHADOW E&M-EST. PATIENT-LVL IV: ICD-10-PCS | Mod: PBBFAC,,, | Performed by: PHYSICAL MEDICINE & REHABILITATION

## 2023-06-12 PROCEDURE — 3008F BODY MASS INDEX DOCD: CPT | Mod: CPTII,,, | Performed by: PHYSICAL MEDICINE & REHABILITATION

## 2023-06-12 PROCEDURE — 1159F MED LIST DOCD IN RCRD: CPT | Mod: CPTII,,, | Performed by: PHYSICAL MEDICINE & REHABILITATION

## 2023-06-12 PROCEDURE — 99214 OFFICE O/P EST MOD 30 MIN: CPT | Mod: PBBFAC | Performed by: PHYSICAL MEDICINE & REHABILITATION

## 2023-06-12 PROCEDURE — 3077F PR MOST RECENT SYSTOLIC BLOOD PRESSURE >= 140 MM HG: ICD-10-PCS | Mod: CPTII,,, | Performed by: PHYSICAL MEDICINE & REHABILITATION

## 2023-06-12 PROCEDURE — 99999 PR PBB SHADOW E&M-EST. PATIENT-LVL IV: CPT | Mod: PBBFAC,,, | Performed by: PHYSICAL MEDICINE & REHABILITATION

## 2023-06-12 RX ORDER — KETOROLAC TROMETHAMINE 10 MG/1
10 TABLET, FILM COATED ORAL 2 TIMES DAILY
Qty: 10 TABLET | Refills: 0 | Status: SHIPPED | OUTPATIENT
Start: 2023-06-12 | End: 2023-06-17

## 2023-06-12 NOTE — PROGRESS NOTES
PM&R NEW PATIENT HISTORY & PHYSICAL :    Referring Physician:    Chief Complaint   Patient presents with    Back Pain    Hip Pain       HPI: This is a 22 y.o.  female being seen in clinic today for evaluation of hip and low back achy pain and instability after recent pregnancy and delivery.  With prolonged standing or position changes, her pain worsens.  She has some numbness at times in her leg and hands. She has been using heat for short term relief    History obtained from patient    Functional History:  Walking: limited  Transfers: Independent  Assistive devices: No  Power mobility: No  Falls: None     Needs help with:  Nothing - all ADLS normal    Past family, medical, social, and surgical history reviewed in chart    Review of Systems:     General- denies lethargy, weight change, fever, chills  Head/neck- denies swallowing difficulties  ENT- denies hearing changes  Cardiovascular-denies chest pain  Pulmonary- denies shortness of breath  GI- denies constipation or bowel incontinence  - denies bladder incontinence  Skin- denies wounds or rashes  Musculoskeletal- + weakness, + pain  Neurologic- + numbness and tingling  Psychiatric- +depression,  anxiety  Lymphatic-denies swelling  Endocrine- denies hypoglycemic symptoms/DM history  All other pertinent systems negative     Physical Examination:  General: Well developed, well nourished female, NAD, obese habitus  HEENT:NCAT EOMI bilaterally   Pulmonary:Normal respirations    Spinal Examination: CERVICAL  Active ROM is within normal limits.  Inspection: No deformity of spinal alignment.       Spinal Examination: LUMBAR or THORACIC  Active ROM is limited in all planes  Inspection: No deformity of spinal alignment.  No palpable olisthesis.  Palpation: No vertebral tenderness to percussion.  Very ttp at si joints, gluteus musculature, gt bursas, mild tight and ttp at paraspinals  Facet loading +bilaterally  SLR Test (seated ):negative bilaterally    Bilateral Upper  and Lower Extremities:  Pulses are 2+ at radial bilaterally.  Shoulder/Elbow/Wrist/Hand ROM   Hip/Knee/Ankle ROM wnl  Bilateral Extremities show normal capillary refill.  No signs of cyanosis, rubor, edema, skin changes, or dysvascular changes of appendages.  Nails appear intact.    Neurological Exam:  Cranial Nerves:  II-XII grossly intact    Manual Muscle Testing: (Motor 5=normal)    RIGHT Upper extremity: Shoulder abduction 5/5, Biceps 5/5, Triceps 5/5, Wrist extension 5/5, Abductor pollicis brevis 5/5, Ulnar hand intrinsics 5/5,  LEFT Upper extremity: Shoulder abduction 5/5, Biceps 5/5, Triceps 5/5, Wrist extension 5/5, Abductor pollicis brevis 5/5, Ulnar hand intrinsics 5/5,  RIGHT Lower extremity: Hip flexion 5/5, Hip Abduction 4/5, Hip Adduction 4/5, Knee extension 5/5, Knee flexion 5/5, Ankle dorsiflexion 5/5, Extensor hallucis longus 5/5, Ankle plantarflexion 5/5  LEFT Lower extremity:  Hip flexion 5/5, Hip Abduction 4/5,Hip Adduction 4/5, Knee extension 5/5, Knee flexion 5/5, Ankle dorsiflexion 5/5, Extensor hallucis longus 5/5, Ankle plantarflexion 5/5    No focal atrophy is noted of either upper or lower extremity.    Bilateral Reflexes:  Dixon's response is absent bilaterally.  No clonus at knee or ankle.    Sensation: tested to light touch  - intact in all four limbs except mild dec at right lower leg    Gait: Narrow base and good arm swing.      IMPRESSION/PLAN: This is a 22 y.o.  female with hip weakness, si joint dysfunction and inflammation post-partum, morbid obesity:Body mass index is 56.26 kg/m².    Discussed in detail for 30 minutes about diagnosis and treatment plan    Pt has appt with PT here=focus on Core/hip strengthening, pelvic stabilization, ROM, modalities, dec pain, HEP  2.  Handouts on back/hip strengthening exercises provided  3. Ice instead of overuse of heat. Try si belt if can find correct size  4. Reviewed xrays  5. Pt has appt for EMG in July.      Anna Marie Short  M.D.  Physical Medicine and Rehab

## 2023-06-19 ENCOUNTER — TELEPHONE (OUTPATIENT)
Dept: OBSTETRICS AND GYNECOLOGY | Facility: CLINIC | Age: 23
End: 2023-06-19
Payer: MEDICAID

## 2023-06-19 ENCOUNTER — OFFICE VISIT (OUTPATIENT)
Dept: OBSTETRICS AND GYNECOLOGY | Facility: CLINIC | Age: 23
End: 2023-06-19
Payer: MEDICAID

## 2023-06-19 VITALS
HEIGHT: 68 IN | SYSTOLIC BLOOD PRESSURE: 132 MMHG | BODY MASS INDEX: 44.41 KG/M2 | DIASTOLIC BLOOD PRESSURE: 89 MMHG | WEIGHT: 293 LBS

## 2023-06-19 DIAGNOSIS — Z75.8 DOES NOT HAVE PRIMARY CARE PROVIDER: ICD-10-CM

## 2023-06-19 DIAGNOSIS — L30.4 INTERTRIGO: Primary | ICD-10-CM

## 2023-06-19 PROCEDURE — 3079F PR MOST RECENT DIASTOLIC BLOOD PRESSURE 80-89 MM HG: ICD-10-PCS | Mod: CPTII,,, | Performed by: PHYSICIAN ASSISTANT

## 2023-06-19 PROCEDURE — 99213 OFFICE O/P EST LOW 20 MIN: CPT | Mod: PBBFAC | Performed by: PHYSICIAN ASSISTANT

## 2023-06-19 PROCEDURE — 3008F PR BODY MASS INDEX (BMI) DOCUMENTED: ICD-10-PCS | Mod: CPTII,,, | Performed by: PHYSICIAN ASSISTANT

## 2023-06-19 PROCEDURE — 3075F SYST BP GE 130 - 139MM HG: CPT | Mod: CPTII,,, | Performed by: PHYSICIAN ASSISTANT

## 2023-06-19 PROCEDURE — 3075F PR MOST RECENT SYSTOLIC BLOOD PRESS GE 130-139MM HG: ICD-10-PCS | Mod: CPTII,,, | Performed by: PHYSICIAN ASSISTANT

## 2023-06-19 PROCEDURE — 99999 PR PBB SHADOW E&M-EST. PATIENT-LVL III: CPT | Mod: PBBFAC,,, | Performed by: PHYSICIAN ASSISTANT

## 2023-06-19 PROCEDURE — 99213 PR OFFICE/OUTPT VISIT, EST, LEVL III, 20-29 MIN: ICD-10-PCS | Mod: S$PBB,,, | Performed by: PHYSICIAN ASSISTANT

## 2023-06-19 PROCEDURE — 99999 PR PBB SHADOW E&M-EST. PATIENT-LVL III: ICD-10-PCS | Mod: PBBFAC,,, | Performed by: PHYSICIAN ASSISTANT

## 2023-06-19 PROCEDURE — 3079F DIAST BP 80-89 MM HG: CPT | Mod: CPTII,,, | Performed by: PHYSICIAN ASSISTANT

## 2023-06-19 PROCEDURE — 3008F BODY MASS INDEX DOCD: CPT | Mod: CPTII,,, | Performed by: PHYSICIAN ASSISTANT

## 2023-06-19 PROCEDURE — 1159F MED LIST DOCD IN RCRD: CPT | Mod: CPTII,,, | Performed by: PHYSICIAN ASSISTANT

## 2023-06-19 PROCEDURE — 1159F PR MEDICATION LIST DOCUMENTED IN MEDICAL RECORD: ICD-10-PCS | Mod: CPTII,,, | Performed by: PHYSICIAN ASSISTANT

## 2023-06-19 PROCEDURE — 99213 OFFICE O/P EST LOW 20 MIN: CPT | Mod: S$PBB,,, | Performed by: PHYSICIAN ASSISTANT

## 2023-06-19 RX ORDER — CLOTRIMAZOLE 1 %
CREAM (GRAM) TOPICAL 2 TIMES DAILY
Qty: 113 G | Refills: 1 | Status: SHIPPED | OUTPATIENT
Start: 2023-06-19 | End: 2023-12-06

## 2023-06-19 RX ORDER — CEPHALEXIN 500 MG/1
500 CAPSULE ORAL EVERY 12 HOURS
COMMUNITY
Start: 2023-03-26 | End: 2023-12-06

## 2023-06-19 RX ORDER — FLUCONAZOLE 100 MG/1
100 TABLET ORAL DAILY
Qty: 1 TABLET | Refills: 0 | Status: SHIPPED | OUTPATIENT
Start: 2023-06-19 | End: 2023-06-20

## 2023-06-19 NOTE — PROGRESS NOTES
OBGYN Post-op Clinic  History and Physical    Patient Name: Ruth Ann Oropeza  YOB: 2000 (22 y.o.)  MRN: [id number]  Today's Date: 2023    Referring Md:   No referring provider defined for this encounter.    SUBJECTIVE:     Chief Complaint: Post-op      History of Present Illness:  Ruth Ann Oropeza is a 22 y.o. female  who presents to the clinic today for incision check. S/p  on 23. States that until a few weeks ago all was well. She states that at this time she started noticing redness and a burning pain near her incision. Denies drainage, bleeding, swelling, fever, chills, or other worrisome symptoms.   Of note, patient does not have a PCP and would like a referral to establish care and have routine screening performed (diabetes, etc).       Review of patient's allergies indicates:  No Known Allergies    Past Medical History:   Diagnosis Date    Asthma     Attention deficit hyperactivity disorder (ADHD) 3/30/2023    Chlamydia infection affecting pregnancy in third trimester 2023    + in triage 23- Azithromycin sent  PUNEET neg    Gestational hypertension without significant proteinuria during pregnancy in third trimester, antepartum     Iron deficiency anemia secondary to inadequate dietary iron intake 2023    Non-reassuring fetal heart rate with late deceleration 2023    Postpartum depression 2023     Past Surgical History:   Procedure Laterality Date     SECTION N/A 2023    Procedure:  SECTION;  Surgeon: Estephanie Cervantes MD;  Location: [facility] L&D;  Service: OB/GYN;  Laterality: N/A;    WISDOM TOOTH EXTRACTION       Family History   Problem Relation Age of Onset    Hypertension Father      Social History     Tobacco Use    Smoking status: Every Day     Types: Cigarettes   Substance Use Topics    Alcohol use: Never    Drug use: Never        Review of Systems:  Review of Systems   Constitutional:  Negative for chills and  "fever.   HENT:  Negative for congestion and sore throat.    Respiratory:  Negative for cough and shortness of breath.    Cardiovascular:  Negative for chest pain and leg swelling.   Gastrointestinal:  Positive for heartburn. Negative for abdominal pain, nausea and vomiting.   Genitourinary:  Negative for dysuria.   Musculoskeletal:  Negative for myalgias.   Skin:  Positive for rash.   Neurological:  Negative for weakness and headaches.     OBJECTIVE:     Vital Signs (Most Recent)  /89   Ht 5' 8" (1.727 m)   Wt (!) 171.3 kg (377 lb 10.4 oz)   BMI 57.42 kg/m²     Physical Exam  Vitals reviewed.   Constitutional:       General: She is not in acute distress.     Appearance: Normal appearance. She is not ill-appearing or toxic-appearing.   HENT:      Head: Normocephalic and atraumatic.   Eyes:      Extraocular Movements: Extraocular movements intact.      Conjunctiva/sclera: Conjunctivae normal.   Pulmonary:      Effort: Pulmonary effort is normal. No respiratory distress.   Abdominal:      Palpations: Abdomen is soft.   Musculoskeletal:         General: Normal range of motion.      Cervical back: Normal range of motion.   Skin:     General: Skin is warm and dry.      Findings: Rash present.      Comments: Erythema in skin fold surrounding healed Pfannenstiel incision. No warmth, induration, drainage, or bleeding appreciated.    Neurological:      General: No focal deficit present.      Mental Status: She is alert and oriented to person, place, and time.   Psychiatric:         Mood and Affect: Mood normal.         Behavior: Behavior normal.         Thought Content: Thought content normal.         Judgment: Judgment normal.           ASSESSMENT/PLAN:     Ruth Ann Oropeza is a 22 y.o. female was seen today for incision check. Rash and discomfort consistent with intertrigo. Will send ointment and medication to her pharmacy. Advised patient to reduce moisture in the area and keep the site clean and dry. Advised " that patient use a pad or a cloth to help absorb sweat. Will send referral to internal medicine to get her established with primary care. Plan to follow-up next week if no improvement. Patient verbalized understanding and agreed to plan.     Ruth Ann was seen today for post-op evaluation.    Diagnoses and all orders for this visit:    Intertrigo  -     fluconazole (DIFLUCAN) 100 MG tablet; Take 1 tablet (100 mg total) by mouth once daily. for 1 day  -     clotrimazole (LOTRIMIN) 1 % cream; Apply topically 2 (two) times daily.  - RTC next week if no improvement    Does not have primary care provider  -     Ambulatory referral/consult to Internal Medicine; Future            ROWENA Barrera, PA-C  OBGYN - Surgery  [facility]

## 2023-06-19 NOTE — TELEPHONE ENCOUNTER
----- Message from Shellie Moss sent at 6/19/2023  8:29 AM CDT -----  Contact: Ruth Ann Leal is calling in regards to questions and concerns. Reports being two months postpartum and area where c section scar is, is red, swollen and inflamed. Would like to know how to proceed. Please return call at [phone].

## 2023-06-19 NOTE — TELEPHONE ENCOUNTER
----- Message from Janny Gaines sent at 6/19/2023  9:24 AM CDT -----  Contact: Ruth Ann  Ruth Ann would like a call back at [phone] in regards to patient being 2 months post partum and is having pain after her c section, she states its red swollen and she has severe pain.patient would like to be seen today if possible.  Thanks   Am

## 2023-06-22 ENCOUNTER — CLINICAL SUPPORT (OUTPATIENT)
Dept: REHABILITATION | Facility: HOSPITAL | Age: 23
End: 2023-06-22
Attending: FAMILY MEDICINE
Payer: MEDICAID

## 2023-06-22 DIAGNOSIS — M54.42 CHRONIC BILATERAL LOW BACK PAIN WITH BILATERAL SCIATICA: Primary | ICD-10-CM

## 2023-06-22 DIAGNOSIS — M62.5A2 MUSCLE WASTING AND ATROPHY, NOT ELSEWHERE CLASSIFIED, BACK, LUMBOSACRAL: ICD-10-CM

## 2023-06-22 DIAGNOSIS — Z74.09 DECREASED MOBILITY AND ENDURANCE: ICD-10-CM

## 2023-06-22 DIAGNOSIS — M54.15 RADICULOPATHY OF THORACOLUMBAR REGION: ICD-10-CM

## 2023-06-22 DIAGNOSIS — G89.29 CHRONIC BILATERAL LOW BACK PAIN WITH BILATERAL SCIATICA: Primary | ICD-10-CM

## 2023-06-22 DIAGNOSIS — M54.41 CHRONIC BILATERAL LOW BACK PAIN WITH BILATERAL SCIATICA: Primary | ICD-10-CM

## 2023-06-22 DIAGNOSIS — M25.60 STIFFNESS OF JOINT: ICD-10-CM

## 2023-06-22 PROBLEM — M54.50 LOW BACK PAIN: Status: ACTIVE | Noted: 2023-06-22

## 2023-06-22 PROCEDURE — 97110 THERAPEUTIC EXERCISES: CPT

## 2023-06-22 PROCEDURE — 97162 PT EVAL MOD COMPLEX 30 MIN: CPT

## 2023-06-22 NOTE — PLAN OF CARE
[facility]   Physical Therapy Initial Evaluation      Date: 2023   Name: Ruth Ann Oropeza  Clinic Number: [id number]    Therapy Diagnosis:    Encounter Diagnoses   Name Primary?    Chronic bilateral low back pain with bilateral sciatica Yes    Radiculopathy of thoracolumbar region     Muscle wasting and atrophy, not elsewhere classified, back, lumbosacral     Decreased mobility and endurance     Stiffness of joint       Physician: Micheline Rosas MD     Physician Orders: PT Eval and Treat  Medical Diagnosis from Referral: Radiculopathy of thoracolumbar region  Evaluation Date: 2023  Authorization Period Expiration: 2024  Plan of Care Expiration: 2023  Progress Note Due: 2023  Visit # / Visits authorized:    FOTO: 1/3 (last performed on 2023)    Precautions: Standard and asthma    Time In: 1522  Time Out: 1627  Total Billable Time (timed & untimed codes): 65 minutes    Subjective     Date of onset: 2023.     History of current condition - Ruth Ann reports she has been having low back pain since giving birth via , having an epidural. Patient reports she had no low back pain prior to childbirth. Patient states she is having radiating symptoms down bilateral (L>R) lower extremity including pain, weakness, numbness, and tingling. Patient states the pain is constant unless she is lying down and then the pain is reduced. Patient reports when on her feet for extended periods (10 minutes) she gets pain and weakness in legs/hips and also when forward bending. Patient is unable to get relief of her pain but can reduce it with rest and medications. 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   Falls: 0    Imaging: [x] Xray [] MRI [] CT: Performed on: 2023  -Normal alignment of the lumbar vertebra.  Normal bone density and architecture.  -No evidence of fracture or  subluxation.    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   Prior Level of Function: Independent and pain free with all ADL, IADL, community mobility and functional activities.   Current Level of Function: Independent with all ADL, IADL, community mobility and functional activities with reports of increased pain and need for increased time and frequent breaks.      Dominant Extremity:    [x] Right    [] Left    Pts goals: Pt reported goals are to decrease overall pain levels in order to return to prior functional level.     Medical History:   Past Medical History:   Diagnosis Date    Asthma     Attention deficit hyperactivity disorder (ADHD) 3/30/2023    Chlamydia infection affecting pregnancy in third trimester 2023    + in triage 23- Azithromycin sent  PUNEET neg    Gestational hypertension without significant proteinuria during pregnancy in third trimester, antepartum     Iron deficiency anemia secondary to inadequate dietary iron intake 2023    Non-reassuring fetal heart rate with late deceleration 2023    Postpartum depression 2023       Surgical History:   Ruth Ann Oorpeza  has a past surgical history that includes [place] tooth extraction and  section (N/A, 2023).    Medications:   Ruth Ann has a current medication list which includes the following prescription(s): albuterol, cephalexin, clotrimazole, ferrous sulfate, labetalol, pantoprazole, and sertraline.    Allergies:   Review of patient's allergies indicates:  No Known Allergies      Objective        RANGE OF MOTION:   Lumbar ROM Right  (spine) Left   Pain/Dysfunction with Movement Goal   Lumbar Flexion (60º) 75% --- Stretch in legs    Lumbar Extension (30º) 25% --- pain    Lumbar Side Bending (25º) 75% 75%     Lumbar Rotation 75% 75% Pain going to left          STRENGTH:   L/E MMT Right  (spine) Left Pain/Dysfunction with Movement Goal   Hip Flexion  3+/5 3+/5  4+/5 B   Hip Extension  3/5 4-/5  4+/5 B   Hip Abduction  4-/5 4-/5  4+/5 B   Knee Extension 4-/5 4-/5  5/5 B   Knee Flexion 4+/5 4+/5  5/5 B   Hip IR 4+/5 4+/5  4+/5 B   Hip ER 4/5 4/5  4+/5 B   Ankle DF 4+/5 4+/5  5/5 B   Ankle PF 4-/5 4-/5  5/5 B      MUSCLE LENGTH:   Muscle Tested  Right Left  Limitation Goal   Hip Flexors [] Normal  [x] Limited [] Normal  [x] Limited  Normal B   ITB / TFL [] Normal  [] Limited [] Normal  [] Limited  Normal B   Quadriceps [] Normal  [x] Limited [] Normal  [x] Limited  Normal B   Hamstrings  [] Normal  [x] Limited [] Normal  [x] Limited  Normal B   Piriformis  [] Normal  [x] Limited [] Normal  [x] Limited  Normal B       JOINT MOBILITY:   Joint Motion  Right Mobility  (spine) Left Mobility Goal   Thoracic PA  [x] Hypo     [] Normal     [] Hyper --- Normal   Lumbar PA [x] Hypo     [] Normal     [] Hyper --- Normal   Lumbar Unilat. PA [x] Hypo     [] Normal     [] Hyper [x] Hypo     [] Normal     [] Hyper Normal   Hip:  [] Hypo     [x] Normal     [] Hyper [] Hypo     [x] Normal     [] Hyper Normal     SPECIAL TESTS:    Right  (spine) Left  Goal   Lumbar Distraction  [] Positive    [x] Negative --- Negative B    Lumbar Compression [x] Positive    [] Negative --- Negative B    SLUMP [x] Positive    [] Negative [x] Positive    [] Negative Negative B    Straight Leg Raise [x] Positive    [] Negative [x] Positive    [] Negative Negative B    ASIS Compression [] Positive    [x] Negative --- Negative    ASIS Distraction  [] Positive    [x] Negative --- Negative    Posterior Shear [] Positive    [x]  Negative [] Positive    [x] Negative Negative B    FERNANDA [x] Positive    [] Negative [x] Positive    [] Negative Negative B           SENSATION  [x] Intact to Light Touch   [] Impaired:      PALPATION: Muscles: Increased tone and tenderness to palpation of: bilateral , paraspinals, quadratus lumborum, glutes, piriformis, quadriceps, hamstrings. Structures: Increased tenderness to palpation of: bilateral , LOWER STRUCTURES : PSIS      POSTURE:  Pt presents with postural abnormalities which include:    [x] Forward Head   [] Increased Lumbar Lordosis   [x] Rounded Shoulder   [] Genu Recurvatum   [] Increased Thoracic Kyphosis [] Genu Valgus   [] Trunk Deviated    [] Pes Planus   [] Scapular Winging    [] Other:           Function:     CMS Impairment/Limitation/Restriction for FOTO Lumbar Survey    Therapist reviewed FOTO scores for Ruth Ann on 6/22/2023.   FOTO documents entered into [employer] - see Media section.    Intake Score: 26%         Treatment     Total Treatment time (time-based codes) separate from Evaluation: (24) minutes     Ruth Ann received the treatments listed below:      MANUAL THERAPY TECHNIQUES were applied for (12) minutes, including:    Manual Intervention Performed Today    Soft Tissue Mobilization [x] bilateral  paraspinals, quadratus lumborum, glutes, piriformis   Joint Mobilizations [x] Grade I Lumbar UPA's   Mobilizations with Movement [x] Hip flexion, Hip IR/ER    []    Functional Dry Needling  []      Plan for Next Visit: Continue as needed       THERAPEUTIC EXERCISES to develop strength, endurance, ROM, flexibility, posture, and core stabilization for (12) minutes including:    Intervention Performed Today    Home exercise plan   x Demonstration, education, performance   Openbooks     Seated Clamshells     Single Knee to Chest Stretch                           Plan for Next Visit: Progress as tolerated. (Doesn't tolerate supine well) NuStep, marches, ball squeeze, sit to stands, Posterior Pelvic  Tilt, nerve glides, hamstring stretch, bridges, 3 way hip       Patient Education and Home Exercises     Education provided: Time included in treatment time.  PURPOSE: Patient educated on the impairments noted above and the effects of physical therapy intervention to improve overall condition and QOL.   EXERCISE: Patient was educated on all the above exercise prior/during/after for proper posture, positioning, and execution for safe performance with home exercise program.   STRENGTH: Patient educated on the importance of improved core and extremity strength in order to improve alignment of the spine and extremities with static positions and dynamic movement.   GAIT & BALANCE: Patient educated on the importance of strong core and lower extremity musculature in order to improve both static and dynamic balance, improve gait mechanics, reduce fall risk and improve household and community mobility.   POSTURE: Patient educated on postural awareness to reduce stress and maintain optimal alignment of the spine with static positions and dynamic movement   SLEEPING POSITIONS: Patient educated on the use of pillows to aid in neutral alignment of spine and extremities when sleeping in supine or side lying.  TRANSFERS & TRANSITIONS: Patient educated on proper technique for bed mobility, transitions and transfers to improve body mechanics and decrease risk of injury.     Written Home Exercises Provided: yes.  Exercises were reviewed and Ruth Ann was able to demonstrate them prior to the end of the session.  Ruth Ann demonstrated good  understanding of the education provided. See EMR under Patient Instructions for exercises provided during therapy sessions.    Assessment     Ruth Ann is a 22 y.o. female referred to outpatient Physical Therapy with a medical diagnosis of Radiculopathy of thoracolumbar region. Pt presents with impairments in the following categories: IMPAIRMENTS: ROM, strength, endurance, joint mobility, muscle length,  "posture, and core strength and stability    Pt prognosis is Good  Pt will benefit from skilled outpatient Physical Therapy to address the deficits stated above and in the chart below, provide pt/family education, and to maximize pt's level of independence.     Plan of care discussed with patient: Yes  Pt's spiritual, cultural and educational needs considered and patient is agreeable to the plan of care and goals as stated below:     Anticipated Barriers for therapy: co-morbidities and sedentary lifestyle, BMI over 50    Medical Necessity is demonstrated by the following  History  Co-morbidities and personal factors that may impact the plan of care [] LOW: no personal factors / co-morbidities  [x] MODERATE: 1-2 personal factors / co-morbidities  [] HIGH: 3+ personal factors / co-morbidities    Moderate / High Support Documentation: lifestyle  Past Medical History:   Diagnosis Date    Asthma     Attention deficit hyperactivity disorder (ADHD) 3/30/2023    Chlamydia infection affecting pregnancy in third trimester 2/13/2023    + in triage 2/9/23- Azithromycin sent  PUNEET neg    Gestational hypertension without significant proteinuria during pregnancy in third trimester, antepartum     Iron deficiency anemia secondary to inadequate dietary iron intake 4/7/2023    Non-reassuring fetal heart rate with late deceleration 4/7/2023    Postpartum depression 4/28/2023        Examination  Body Structures and Functions, activity limitations and participation restrictions that may impact the plan of care [] LOW: addressing 1-2 elements  [x] MODERATE: 3+ elements  [] HIGH: 4+ elements (please support below)    Moderate / High Support Documentation: See above in "Current Level of Function"      Clinical Presentation [] LOW: stable  [x] MODERATE: Evolving  [] HIGH: Unstable     Decision Making/ Complexity Score: moderate         Short Term Goals:  4 weeks Status  Date Met   PAIN: Pt will report worst pain of 7/10 in order to progress " toward max functional ability and improve quality of life. [x] Progressing  [] Met  [] Not Met    FUNCTION: Patient will demonstrate improved function as indicated by a score of greater than or equal to 38 out of 100 on FOTO. [x] Progressing  [] Met  [] Not Met    MOBILITY: Patient will improve AROM to 50% of stated goals, listed in objective measures above, in order to progress towards independence with functional activities.  [x] Progressing  [] Met  [] Not Met    STRENGTH: Patient will improve strength to 50% of stated goals, listed in objective measures above, in order to progress towards independence with functional activities. [x] Progressing  [] Met  [] Not Met    POSTURE: Patient will correct postural deviations in sitting and standing, to decrease pain and promote long term stability.  [x] Progressing  [] Met  [] Not Met    GAIT: Patient will demonstrate improved gait mechanics including normalize gait in order to improve functional mobility, improve quality of life, and decrease risk of further injury or fall.  [x] Progressing  [] Met  [] Not Met    HEP: Patient will demonstrate independence with HEP in order to progress toward functional independence. [x] Progressing  [] Met  [] Not Met    Patient will be able to stand for 30 minutes with 4/10 pain.  [x] Progressing  [] Met  [] Not Met      Long Term Goals:  8 weeks Status Date Met   PAIN: Pt will report worst pain of 3/10 in order to progress toward max functional ability and improve quality of life [x] Progressing  [] Met  [] Not Met    FUNCTION: Patient will demonstrate improved function as indicated by a score of greater than or equal to 50 out of 100 on FOTO. [x] Progressing  [] Met  [] Not Met    MOBILITY: Patient will improve AROM to stated goals, listed in objective measures above, in order to return to maximal functional potential and improve quality of life.  [x] Progressing  [] Met  [] Not Met    STRENGTH: Patient will improve strength to  stated goals, listed in objective measures above, in order to improve functional independence and quality of life.  [x] Progressing  [] Met  [] Not Met    GAIT: Patient will demonstrate normalized gait mechanics with minimal compensation in order to return to PLOF. [x] Progressing  [] Met  [] Not Met    Patient will return to normal ADL's, IADL's, community involvement, recreational activities, and work-related activities with less than or equal to 3/10 pain and maximal function.  [x] Progressing  [] Met  [] Not Met    Patient will be able to stand for 1 hour with 3/10 pain.  [x] Progressing  [] Met  [] Not Met      Plan     Plan of care Certification: 6/22/2023 to 08/17/2023.    Outpatient Physical Therapy 2 times weekly for 8 weeks to include any combination of the following interventions: virtual visits, dry needling, modalities, electrical stimulation (IFC, Pre-Mod, Attended with Functional Dry Needling), Cervical/Lumbar Traction, Gait Training, Manual Therapy, Neuromuscular Re-ed, Patient Education, Self Care, Therapeutic Exercise, and Therapeutic Activites     Milad Keene, PT, DPT

## 2023-06-22 NOTE — PROGRESS NOTES
[facility]   Physical Therapy Initial Evaluation      Date: 2023   Name: Ruth Ann Oropeza  Clinic Number: [id number]    Therapy Diagnosis:    Encounter Diagnoses   Name Primary?    Chronic bilateral low back pain with bilateral sciatica Yes    Radiculopathy of thoracolumbar region     Muscle wasting and atrophy, not elsewhere classified, back, lumbosacral     Decreased mobility and endurance     Stiffness of joint       Physician: Micheline Rosas MD     Physician Orders: PT Eval and Treat  Medical Diagnosis from Referral: Radiculopathy of thoracolumbar region  Evaluation Date: 2023  Authorization Period Expiration: 2024  Plan of Care Expiration: 2023  Progress Note Due: 2023  Visit # / Visits authorized:    FOTO: 1/3 (last performed on 2023)    Precautions: Standard and asthma    Time In: 1522  Time Out: 1627  Total Billable Time (timed & untimed codes): 65 minutes    Subjective     Date of onset: 2023.     History of current condition - Ruth Ann reports she has been having low back pain since giving birth via , having an epidural. Patient reports she had no low back pain prior to childbirth. Patient states she is having radiating symptoms down bilateral (L>R) lower extremity including pain, weakness, numbness, and tingling. Patient states the pain is constant unless she is lying down and then the pain is reduced. Patient reports when on her feet for extended periods (10 minutes) she gets pain and weakness in legs/hips and also when forward bending. Patient is unable to get relief of her pain but can reduce it with rest and medications. 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   Falls: 0    Imaging: [x] Xray [] MRI [] CT: Performed on: 2023  -Normal alignment of the lumbar vertebra.  Normal bone density and architecture.  -No evidence of fracture or  subluxation.    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   Prior Level of Function: Independent and pain free with all ADL, IADL, community mobility and functional activities.   Current Level of Function: Independent with all ADL, IADL, community mobility and functional activities with reports of increased pain and need for increased time and frequent breaks.      Dominant Extremity:    [x] Right    [] Left    Pts goals: Pt reported goals are to decrease overall pain levels in order to return to prior functional level.     Medical History:   Past Medical History:   Diagnosis Date    Asthma     Attention deficit hyperactivity disorder (ADHD) 3/30/2023    Chlamydia infection affecting pregnancy in third trimester 2023    + in triage 23- Azithromycin sent  PUNEET neg    Gestational hypertension without significant proteinuria during pregnancy in third trimester, antepartum     Iron deficiency anemia secondary to inadequate dietary iron intake 2023    Non-reassuring fetal heart rate with late deceleration 2023    Postpartum depression 2023       Surgical History:   Ruth Ann Oropeza  has a past surgical history that includes [place] tooth extraction and  section (N/A, 2023).    Medications:   Ruth Ann has a current medication list which includes the following prescription(s): albuterol, cephalexin, clotrimazole, ferrous sulfate, labetalol, pantoprazole, and sertraline.    Allergies:   Review of patient's allergies indicates:  No Known Allergies      Objective        RANGE OF MOTION:   Lumbar ROM Right  (spine) Left   Pain/Dysfunction with Movement Goal   Lumbar Flexion (60º) 75% --- Stretch in legs    Lumbar Extension (30º) 25% --- pain    Lumbar Side Bending (25º) 75% 75%     Lumbar Rotation 75% 75% Pain going to left          STRENGTH:   L/E MMT Right  (spine) Left Pain/Dysfunction with Movement Goal   Hip Flexion  3+/5 3+/5  4+/5 B   Hip Extension  3/5 4-/5  4+/5 B   Hip Abduction  4-/5 4-/5  4+/5 B   Knee Extension 4-/5 4-/5  5/5 B   Knee Flexion 4+/5 4+/5  5/5 B   Hip IR 4+/5 4+/5  4+/5 B   Hip ER 4/5 4/5  4+/5 B   Ankle DF 4+/5 4+/5  5/5 B   Ankle PF 4-/5 4-/5  5/5 B      MUSCLE LENGTH:   Muscle Tested  Right Left  Limitation Goal   Hip Flexors [] Normal  [x] Limited [] Normal  [x] Limited  Normal B   ITB / TFL [] Normal  [] Limited [] Normal  [] Limited  Normal B   Quadriceps [] Normal  [x] Limited [] Normal  [x] Limited  Normal B   Hamstrings  [] Normal  [x] Limited [] Normal  [x] Limited  Normal B   Piriformis  [] Normal  [x] Limited [] Normal  [x] Limited  Normal B       JOINT MOBILITY:   Joint Motion  Right Mobility  (spine) Left Mobility Goal   Thoracic PA  [x] Hypo     [] Normal     [] Hyper --- Normal   Lumbar PA [x] Hypo     [] Normal     [] Hyper --- Normal   Lumbar Unilat. PA [x] Hypo     [] Normal     [] Hyper [x] Hypo     [] Normal     [] Hyper Normal   Hip:  [] Hypo     [x] Normal     [] Hyper [] Hypo     [x] Normal     [] Hyper Normal     SPECIAL TESTS:    Right  (spine) Left  Goal   Lumbar Distraction  [] Positive    [x] Negative --- Negative B    Lumbar Compression [x] Positive    [] Negative --- Negative B    SLUMP [x] Positive    [] Negative [x] Positive    [] Negative Negative B    Straight Leg Raise [x] Positive    [] Negative [x] Positive    [] Negative Negative B    ASIS Compression [] Positive    [x] Negative --- Negative    ASIS Distraction  [] Positive    [x] Negative --- Negative    Posterior Shear [] Positive    [x]  Negative [] Positive    [x] Negative Negative B    FERNANDA [x] Positive    [] Negative [x] Positive    [] Negative Negative B           SENSATION  [x] Intact to Light Touch   [] Impaired:      PALPATION: Muscles: Increased tone and tenderness to palpation of: bilateral , paraspinals, quadratus lumborum, glutes, piriformis, quadriceps, hamstrings. Structures: Increased tenderness to palpation of: bilateral , LOWER STRUCTURES : PSIS      POSTURE:  Pt presents with postural abnormalities which include:    [x] Forward Head   [] Increased Lumbar Lordosis   [x] Rounded Shoulder   [] Genu Recurvatum   [] Increased Thoracic Kyphosis [] Genu Valgus   [] Trunk Deviated    [] Pes Planus   [] Scapular Winging    [] Other:           Function:     CMS Impairment/Limitation/Restriction for FOTO Lumbar Survey    Therapist reviewed FOTO scores for Ruth Ann on 6/22/2023.   FOTO documents entered into [employer] - see Media section.    Intake Score: 26%         Treatment     Total Treatment time (time-based codes) separate from Evaluation: (24) minutes     Ruth Ann received the treatments listed below:      MANUAL THERAPY TECHNIQUES were applied for (12) minutes, including:    Manual Intervention Performed Today    Soft Tissue Mobilization [x] bilateral  paraspinals, quadratus lumborum, glutes, piriformis   Joint Mobilizations [x] Grade I Lumbar UPA's   Mobilizations with Movement [x] Hip flexion, Hip IR/ER    []    Functional Dry Needling  []      Plan for Next Visit: Continue as needed       THERAPEUTIC EXERCISES to develop strength, endurance, ROM, flexibility, posture, and core stabilization for (12) minutes including:    Intervention Performed Today    Home exercise plan   x Demonstration, education, performance   Openbooks     Seated Clamshells     Single Knee to Chest Stretch                           Plan for Next Visit: Progress as tolerated. (Doesn't tolerate supine well) NuStep, marches, ball squeeze, sit to stands, Posterior Pelvic  Tilt, nerve glides, hamstring stretch, bridges, 3 way hip       Patient Education and Home Exercises     Education provided: Time included in treatment time.  PURPOSE: Patient educated on the impairments noted above and the effects of physical therapy intervention to improve overall condition and QOL.   EXERCISE: Patient was educated on all the above exercise prior/during/after for proper posture, positioning, and execution for safe performance with home exercise program.   STRENGTH: Patient educated on the importance of improved core and extremity strength in order to improve alignment of the spine and extremities with static positions and dynamic movement.   GAIT & BALANCE: Patient educated on the importance of strong core and lower extremity musculature in order to improve both static and dynamic balance, improve gait mechanics, reduce fall risk and improve household and community mobility.   POSTURE: Patient educated on postural awareness to reduce stress and maintain optimal alignment of the spine with static positions and dynamic movement   SLEEPING POSITIONS: Patient educated on the use of pillows to aid in neutral alignment of spine and extremities when sleeping in supine or side lying.  TRANSFERS & TRANSITIONS: Patient educated on proper technique for bed mobility, transitions and transfers to improve body mechanics and decrease risk of injury.     Written Home Exercises Provided: yes.  Exercises were reviewed and Ruth Ann was able to demonstrate them prior to the end of the session.  Ruth Ann demonstrated good  understanding of the education provided. See EMR under Patient Instructions for exercises provided during therapy sessions.    Assessment     Ruth Ann is a 22 y.o. female referred to outpatient Physical Therapy with a medical diagnosis of Radiculopathy of thoracolumbar region. Pt presents with impairments in the following categories: IMPAIRMENTS: ROM, strength, endurance, joint mobility, muscle length,  "posture, and core strength and stability    Pt prognosis is Good  Pt will benefit from skilled outpatient Physical Therapy to address the deficits stated above and in the chart below, provide pt/family education, and to maximize pt's level of independence.     Plan of care discussed with patient: Yes  Pt's spiritual, cultural and educational needs considered and patient is agreeable to the plan of care and goals as stated below:     Anticipated Barriers for therapy: co-morbidities and sedentary lifestyle, BMI over 50    Medical Necessity is demonstrated by the following  History  Co-morbidities and personal factors that may impact the plan of care [] LOW: no personal factors / co-morbidities  [x] MODERATE: 1-2 personal factors / co-morbidities  [] HIGH: 3+ personal factors / co-morbidities    Moderate / High Support Documentation: lifestyle  Past Medical History:   Diagnosis Date    Asthma     Attention deficit hyperactivity disorder (ADHD) 3/30/2023    Chlamydia infection affecting pregnancy in third trimester 2/13/2023    + in triage 2/9/23- Azithromycin sent  PUNEET neg    Gestational hypertension without significant proteinuria during pregnancy in third trimester, antepartum     Iron deficiency anemia secondary to inadequate dietary iron intake 4/7/2023    Non-reassuring fetal heart rate with late deceleration 4/7/2023    Postpartum depression 4/28/2023        Examination  Body Structures and Functions, activity limitations and participation restrictions that may impact the plan of care [] LOW: addressing 1-2 elements  [x] MODERATE: 3+ elements  [] HIGH: 4+ elements (please support below)    Moderate / High Support Documentation: See above in "Current Level of Function"      Clinical Presentation [] LOW: stable  [x] MODERATE: Evolving  [] HIGH: Unstable     Decision Making/ Complexity Score: moderate         Short Term Goals:  4 weeks Status  Date Met   PAIN: Pt will report worst pain of 7/10 in order to progress " toward max functional ability and improve quality of life. [x] Progressing  [] Met  [] Not Met    FUNCTION: Patient will demonstrate improved function as indicated by a score of greater than or equal to 38 out of 100 on FOTO. [x] Progressing  [] Met  [] Not Met    MOBILITY: Patient will improve AROM to 50% of stated goals, listed in objective measures above, in order to progress towards independence with functional activities.  [x] Progressing  [] Met  [] Not Met    STRENGTH: Patient will improve strength to 50% of stated goals, listed in objective measures above, in order to progress towards independence with functional activities. [x] Progressing  [] Met  [] Not Met    POSTURE: Patient will correct postural deviations in sitting and standing, to decrease pain and promote long term stability.  [x] Progressing  [] Met  [] Not Met    GAIT: Patient will demonstrate improved gait mechanics including normalize gait in order to improve functional mobility, improve quality of life, and decrease risk of further injury or fall.  [x] Progressing  [] Met  [] Not Met    HEP: Patient will demonstrate independence with HEP in order to progress toward functional independence. [x] Progressing  [] Met  [] Not Met    Patient will be able to stand for 30 minutes with 4/10 pain.  [x] Progressing  [] Met  [] Not Met      Long Term Goals:  8 weeks Status Date Met   PAIN: Pt will report worst pain of 3/10 in order to progress toward max functional ability and improve quality of life [x] Progressing  [] Met  [] Not Met    FUNCTION: Patient will demonstrate improved function as indicated by a score of greater than or equal to 50 out of 100 on FOTO. [x] Progressing  [] Met  [] Not Met    MOBILITY: Patient will improve AROM to stated goals, listed in objective measures above, in order to return to maximal functional potential and improve quality of life.  [x] Progressing  [] Met  [] Not Met    STRENGTH: Patient will improve strength to  stated goals, listed in objective measures above, in order to improve functional independence and quality of life.  [x] Progressing  [] Met  [] Not Met    GAIT: Patient will demonstrate normalized gait mechanics with minimal compensation in order to return to PLOF. [x] Progressing  [] Met  [] Not Met    Patient will return to normal ADL's, IADL's, community involvement, recreational activities, and work-related activities with less than or equal to 3/10 pain and maximal function.  [x] Progressing  [] Met  [] Not Met    Patient will be able to stand for 1 hour with 3/10 pain.  [x] Progressing  [] Met  [] Not Met      Plan     Plan of care Certification: 6/22/2023 to 08/17/2023.    Outpatient Physical Therapy 2 times weekly for 8 weeks to include any combination of the following interventions: virtual visits, dry needling, modalities, electrical stimulation (IFC, Pre-Mod, Attended with Functional Dry Needling), Cervical/Lumbar Traction, Gait Training, Manual Therapy, Neuromuscular Re-ed, Patient Education, Self Care, Therapeutic Exercise, and Therapeutic Activites     Milad Keene, PT, DPT

## 2023-06-23 ENCOUNTER — LAB VISIT (OUTPATIENT)
Dept: LAB | Facility: HOSPITAL | Age: 23
End: 2023-06-23
Attending: NURSE PRACTITIONER
Payer: MEDICAID

## 2023-06-23 ENCOUNTER — OFFICE VISIT (OUTPATIENT)
Dept: PRIMARY CARE CLINIC | Facility: CLINIC | Age: 23
End: 2023-06-23
Payer: MEDICAID

## 2023-06-23 VITALS
HEIGHT: 68 IN | SYSTOLIC BLOOD PRESSURE: 118 MMHG | DIASTOLIC BLOOD PRESSURE: 86 MMHG | RESPIRATION RATE: 20 BRPM | BODY MASS INDEX: 44.41 KG/M2 | OXYGEN SATURATION: 96 % | HEART RATE: 73 BPM | TEMPERATURE: 99 F | WEIGHT: 293 LBS

## 2023-06-23 DIAGNOSIS — Z09 FOLLOW-UP EXAM: ICD-10-CM

## 2023-06-23 DIAGNOSIS — D50.8 OTHER IRON DEFICIENCY ANEMIA: ICD-10-CM

## 2023-06-23 DIAGNOSIS — M54.15 RADICULOPATHY OF THORACOLUMBAR REGION: ICD-10-CM

## 2023-06-23 DIAGNOSIS — R53.83 FATIGUE, UNSPECIFIED TYPE: ICD-10-CM

## 2023-06-23 DIAGNOSIS — Z75.8 DOES NOT HAVE PRIMARY CARE PROVIDER: ICD-10-CM

## 2023-06-23 DIAGNOSIS — R53.83 FATIGUE, UNSPECIFIED TYPE: Primary | ICD-10-CM

## 2023-06-23 DIAGNOSIS — L98.9 SKIN LESIONS: ICD-10-CM

## 2023-06-23 LAB
BASOPHILS # BLD AUTO: 0.07 K/UL (ref 0–0.2)
BASOPHILS NFR BLD: 0.8 % (ref 0–1.9)
DIFFERENTIAL METHOD: ABNORMAL
EOSINOPHIL # BLD AUTO: 0.4 K/UL (ref 0–0.5)
EOSINOPHIL NFR BLD: 4.1 % (ref 0–8)
ERYTHROCYTE [DISTWIDTH] IN BLOOD BY AUTOMATED COUNT: 14 % (ref 11.5–14.5)
ESTIMATED AVG GLUCOSE: 108 MG/DL (ref 68–131)
HBA1C MFR BLD: 5.4 % (ref 4–5.6)
HCT VFR BLD AUTO: 38.4 % (ref 37–48.5)
HGB BLD-MCNC: 11.5 G/DL (ref 12–16)
IMM GRANULOCYTES # BLD AUTO: 0.02 K/UL (ref 0–0.04)
IMM GRANULOCYTES NFR BLD AUTO: 0.2 % (ref 0–0.5)
LYMPHOCYTES # BLD AUTO: 2.5 K/UL (ref 1–4.8)
LYMPHOCYTES NFR BLD: 27.7 % (ref 18–48)
MCH RBC QN AUTO: 24.3 PG (ref 27–31)
MCHC RBC AUTO-ENTMCNC: 29.9 G/DL (ref 32–36)
MCV RBC AUTO: 81 FL (ref 82–98)
MONOCYTES # BLD AUTO: 0.5 K/UL (ref 0.3–1)
MONOCYTES NFR BLD: 5.2 % (ref 4–15)
NEUTROPHILS # BLD AUTO: 5.6 K/UL (ref 1.8–7.7)
NEUTROPHILS NFR BLD: 62 % (ref 38–73)
NRBC BLD-RTO: 0 /100 WBC
PLATELET # BLD AUTO: 280 K/UL (ref 150–450)
PMV BLD AUTO: 11.1 FL (ref 9.2–12.9)
RBC # BLD AUTO: 4.74 M/UL (ref 4–5.4)
WBC # BLD AUTO: 9.01 K/UL (ref 3.9–12.7)

## 2023-06-23 PROCEDURE — 99215 OFFICE O/P EST HI 40 MIN: CPT | Mod: PBBFAC,PN | Performed by: NURSE PRACTITIONER

## 2023-06-23 PROCEDURE — 3008F PR BODY MASS INDEX (BMI) DOCUMENTED: ICD-10-PCS | Mod: CPTII,,, | Performed by: NURSE PRACTITIONER

## 2023-06-23 PROCEDURE — 1160F RVW MEDS BY RX/DR IN RCRD: CPT | Mod: CPTII,,, | Performed by: NURSE PRACTITIONER

## 2023-06-23 PROCEDURE — 3074F SYST BP LT 130 MM HG: CPT | Mod: CPTII,,, | Performed by: NURSE PRACTITIONER

## 2023-06-23 PROCEDURE — 99999 PR PBB SHADOW E&M-EST. PATIENT-LVL V: ICD-10-PCS | Mod: PBBFAC,,, | Performed by: NURSE PRACTITIONER

## 2023-06-23 PROCEDURE — 3074F PR MOST RECENT SYSTOLIC BLOOD PRESSURE < 130 MM HG: ICD-10-PCS | Mod: CPTII,,, | Performed by: NURSE PRACTITIONER

## 2023-06-23 PROCEDURE — 99214 PR OFFICE/OUTPT VISIT, EST, LEVL IV, 30-39 MIN: ICD-10-PCS | Mod: S$PBB,,, | Performed by: NURSE PRACTITIONER

## 2023-06-23 PROCEDURE — 3079F PR MOST RECENT DIASTOLIC BLOOD PRESSURE 80-89 MM HG: ICD-10-PCS | Mod: CPTII,,, | Performed by: NURSE PRACTITIONER

## 2023-06-23 PROCEDURE — 85025 COMPLETE CBC W/AUTO DIFF WBC: CPT | Performed by: NURSE PRACTITIONER

## 2023-06-23 PROCEDURE — 36415 COLL VENOUS BLD VENIPUNCTURE: CPT | Mod: PN | Performed by: NURSE PRACTITIONER

## 2023-06-23 PROCEDURE — 1159F MED LIST DOCD IN RCRD: CPT | Mod: CPTII,,, | Performed by: NURSE PRACTITIONER

## 2023-06-23 PROCEDURE — 99999 PR PBB SHADOW E&M-EST. PATIENT-LVL V: CPT | Mod: PBBFAC,,, | Performed by: NURSE PRACTITIONER

## 2023-06-23 PROCEDURE — 83036 HEMOGLOBIN GLYCOSYLATED A1C: CPT | Performed by: NURSE PRACTITIONER

## 2023-06-23 PROCEDURE — 1160F PR REVIEW ALL MEDS BY PRESCRIBER/CLIN PHARMACIST DOCUMENTED: ICD-10-PCS | Mod: CPTII,,, | Performed by: NURSE PRACTITIONER

## 2023-06-23 PROCEDURE — 3079F DIAST BP 80-89 MM HG: CPT | Mod: CPTII,,, | Performed by: NURSE PRACTITIONER

## 2023-06-23 PROCEDURE — 84443 ASSAY THYROID STIM HORMONE: CPT | Performed by: NURSE PRACTITIONER

## 2023-06-23 PROCEDURE — 99214 OFFICE O/P EST MOD 30 MIN: CPT | Mod: S$PBB,,, | Performed by: NURSE PRACTITIONER

## 2023-06-23 PROCEDURE — 3008F BODY MASS INDEX DOCD: CPT | Mod: CPTII,,, | Performed by: NURSE PRACTITIONER

## 2023-06-23 PROCEDURE — 1159F PR MEDICATION LIST DOCUMENTED IN MEDICAL RECORD: ICD-10-PCS | Mod: CPTII,,, | Performed by: NURSE PRACTITIONER

## 2023-06-23 NOTE — PATIENT INSTRUCTIONS
Take your iron supplements twice daily with orange juice.  Rest as often as you need to.  Eat more iron rich foods.  Continue with physical therapy.  Alternate tylenol and ibuprofen every 4 hours as needed for pain.  Report to the ER for worsening of any symptoms.

## 2023-06-23 NOTE — PROGRESS NOTES
"Subjective:      Patient ID: Ruth Ann Oropeza is a 22 y.o. female.    Chief Complaint: No chief complaint on file.    /86 (BP Location: Left arm, Patient Position: Sitting, BP Method: Large (Manual))   Pulse 73   Temp 98.9 °F (37.2 °C) (Oral)   Resp 20   Ht 5' 8" (1.727 m)   Wt (!) 174 kg (383 lb 9.6 oz)   SpO2 96%   Breastfeeding No   BMI 58.33 kg/m²     23 y/o female presents for 1 month follow up with continued c/o back pain. Can't stand up for long periods of time before pain becomes 10/10 and has to sit down. Unable to lay on her back or side comfortably. States she was under the impression that gabapentin was being prescribed for pain but Lyrica was sent to pharmacy. Sleeps on stomach. Began physical therapy yesterday. C/o fatigue and feeling tired even after sleeping 6 hours of sleep. Takes iron supplements daily. Pt is also concerned about lesions on her back. Says they "have always been there" but increased in number with pregnancy. Denies pain and drainage but admits itching on occasion. Pt admits having anxiety and depression and would like a referral to mental health. Denies suicidal and homicidal thoughts.       Review of patient's allergies indicates:  No Known Allergies     Review of Systems   Constitutional:  Negative for chills and fever.   HENT:  Negative for congestion and sore throat.    Respiratory:  Negative for cough and shortness of breath.    Cardiovascular:  Negative for chest pain and palpitations.   Gastrointestinal:  Negative for nausea and vomiting.   Genitourinary: Negative.    Skin: Negative.    Neurological:  Negative for headaches.    Objective:      Physical Exam  Vitals reviewed.   Constitutional:       Appearance: She is well-developed.   HENT:      Head: Normocephalic and atraumatic.      Right Ear: External ear normal.      Left Ear: External ear normal.      Nose: Nose normal. No mucosal edema or rhinorrhea.      Mouth/Throat:      Mouth: Mucous membranes are " moist.      Pharynx: Uvula midline.   Eyes:      General: Lids are normal. Gaze aligned appropriately.      Extraocular Movements: Extraocular movements intact.      Conjunctiva/sclera: Conjunctivae normal.   Cardiovascular:      Rate and Rhythm: Normal rate and regular rhythm.      Heart sounds: Normal heart sounds.   Pulmonary:      Effort: Pulmonary effort is normal.      Breath sounds: Normal breath sounds. No decreased breath sounds or wheezing.   Musculoskeletal:         General: Normal range of motion.      Cervical back: Normal range of motion and neck supple.        Back:    Lymphadenopathy:      Cervical: No cervical adenopathy.   Skin:     General: Skin is warm and dry.      Capillary Refill: Capillary refill takes less than 2 seconds.      Coloration: Skin is not cyanotic or pale.          Neurological:      Mental Status: She is alert and oriented to person, place, and time.      Cranial Nerves: No cranial nerve deficit.   Psychiatric:         Attention and Perception: Attention normal.         Mood and Affect: Mood normal.         Speech: Speech normal.         Behavior: Behavior normal.         Thought Content: Thought content normal. Thought content does not include homicidal or suicidal ideation.         Cognition and Memory: Cognition normal.             LABS REVIEWED  Admission on 04/04/2023, Discharged on 04/11/2023   Component Date Value Ref Range Status    WBC 04/04/2023 10.97  3.90 - 12.70 K/uL Final    RBC 04/04/2023 4.28  4.00 - 5.40 M/uL Final    Hemoglobin 04/04/2023 11.0 (L)  12.0 - 16.0 g/dL Final    Hematocrit 04/04/2023 34.2 (L)  37.0 - 48.5 % Final    MCV 04/04/2023 80 (L)  82 - 98 fL Final    MCH 04/04/2023 25.7 (L)  27.0 - 31.0 pg Final    MCHC 04/04/2023 32.2  32.0 - 36.0 g/dL Final    RDW 04/04/2023 14.4  11.5 - 14.5 % Final    Platelets 04/04/2023 256  150 - 450 K/uL Final    MPV 04/04/2023 11.8  9.2 - 12.9 fL Final    Immature Granulocytes 04/04/2023 0.5  0.0 - 0.5 % Final     Gran # (ANC) 04/04/2023 8.2 (H)  1.8 - 7.7 K/uL Final    Immature Grans (Abs) 04/04/2023 0.05 (H)  0.00 - 0.04 K/uL Final    Lymph # 04/04/2023 2.2  1.0 - 4.8 K/uL Final    Mono # 04/04/2023 0.4  0.3 - 1.0 K/uL Final    Eos # 04/04/2023 0.1  0.0 - 0.5 K/uL Final    Baso # 04/04/2023 0.03  0.00 - 0.20 K/uL Final    nRBC 04/04/2023 0  0 /100 WBC Final    Gran % 04/04/2023 74.7 (H)  38.0 - 73.0 % Final    Lymph % 04/04/2023 19.8  18.0 - 48.0 % Final    Mono % 04/04/2023 3.6 (L)  4.0 - 15.0 % Final    Eosinophil % 04/04/2023 1.1  0.0 - 8.0 % Final    Basophil % 04/04/2023 0.3  0.0 - 1.9 % Final    Differential Method 04/04/2023 Automated   Final    Sodium 04/04/2023 139  136 - 145 mmol/L Final    Potassium 04/04/2023 4.0  3.5 - 5.1 mmol/L Final    Chloride 04/04/2023 110  95 - 110 mmol/L Final    CO2 04/04/2023 19 (L)  23 - 29 mmol/L Final    Glucose 04/04/2023 98  70 - 110 mg/dL Final    BUN 04/04/2023 11  6 - 20 mg/dL Final    Creatinine 04/04/2023 0.8  0.5 - 1.4 mg/dL Final    Calcium 04/04/2023 8.9  8.7 - 10.5 mg/dL Final    Total Protein 04/04/2023 6.3  6.0 - 8.4 g/dL Final    Albumin 04/04/2023 2.4 (L)  3.5 - 5.2 g/dL Final    Total Bilirubin 04/04/2023 0.2  0.1 - 1.0 mg/dL Final    Alkaline Phosphatase 04/04/2023 121  55 - 135 U/L Final    AST 04/04/2023 11  10 - 40 U/L Final    ALT 04/04/2023 10  10 - 44 U/L Final    Anion Gap 04/04/2023 10  8 - 16 mmol/L Final    eGFR 04/04/2023 >60  >60 mL/min/1.73 m^2 Final    Protein, Urine Random 04/04/2023 20 (H)  0 - 15 mg/dL Final    Creatinine, Urine 04/04/2023 168.2  15.0 - 325.0 mg/dL Final    Prot/Creat Ratio, Urine 04/04/2023 0.12  0.00 - 0.20 Final    Group & Rh 04/04/2023 O POS   Final    Indirect Mylene 04/04/2023 NEG   Final    Specimen Outdate 04/04/2023 04/07/2023 23:59   Final    WBC 04/10/2023 7.52  3.90 - 12.70 K/uL Final    RBC 04/10/2023 3.56 (L)  4.00 - 5.40 M/uL Final    Hemoglobin 04/10/2023 9.2 (L)  12.0 - 16.0 g/dL Final    Hematocrit 04/10/2023 29.8  (L)  37.0 - 48.5 % Final    MCV 04/10/2023 84  82 - 98 fL Final    MCH 04/10/2023 25.8 (L)  27.0 - 31.0 pg Final    MCHC 04/10/2023 30.9 (L)  32.0 - 36.0 g/dL Final    RDW 04/10/2023 14.8 (H)  11.5 - 14.5 % Final    Platelets 04/10/2023 191  150 - 450 K/uL Final    MPV 04/10/2023 11.3  9.2 - 12.9 fL Final    Immature Granulocytes 04/10/2023 0.4  0.0 - 0.5 % Final    Gran # (ANC) 04/10/2023 5.0  1.8 - 7.7 K/uL Final    Immature Grans (Abs) 04/10/2023 0.03  0.00 - 0.04 K/uL Final    Lymph # 04/10/2023 1.8  1.0 - 4.8 K/uL Final    Mono # 04/10/2023 0.4  0.3 - 1.0 K/uL Final    Eos # 04/10/2023 0.2  0.0 - 0.5 K/uL Final    Baso # 04/10/2023 0.05  0.00 - 0.20 K/uL Final    nRBC 04/10/2023 0  0 /100 WBC Final    Gran % 04/10/2023 66.1  38.0 - 73.0 % Final    Lymph % 04/10/2023 24.1  18.0 - 48.0 % Final    Mono % 04/10/2023 5.5  4.0 - 15.0 % Final    Eosinophil % 04/10/2023 3.2  0.0 - 8.0 % Final    Basophil % 04/10/2023 0.7  0.0 - 1.9 % Final    Differential Method 04/10/2023 Automated   Final      Assessment:       1. Fatigue, unspecified type    2. Radiculopathy of thoracolumbar region    3. Other iron deficiency anemia    4. Skin lesions    5. Follow-up exam    6. Does not have primary care provider          Plan:     Fatigue, unspecified type  -     CBC Auto Differential; Future; Expected date: 06/23/2023  -     TSH; Future; Expected date: 06/23/2023  -     Hemoglobin A1C; Future; Expected date: 06/23/2023    Radiculopathy of thoracolumbar region    Other iron deficiency anemia  -     CBC Auto Differential; Future; Expected date: 06/23/2023    Skin lesions  Comments:  back    Orders:  -     Ambulatory referral/consult to Dermatology; Future; Expected date: 06/30/2023    Follow-up exam    Does not have primary care provider  -     Ambulatory referral/consult to Internal Medicine    Recommended pt follow up with [facility] or [facility] ([facility])   Referral placed to derm.    Take your iron  supplements twice daily with orange juice.  Rest as often as you need to.  Eat more iron rich foods.  Continue with physical therapy.  Alternate tylenol and ibuprofen every 4 hours as needed for pain.  Report to the ER for worsening of any symptoms.

## 2023-06-24 LAB — TSH SERPL DL<=0.005 MIU/L-ACNC: 1.18 UIU/ML (ref 0.4–4)

## 2023-07-10 ENCOUNTER — DOCUMENTATION ONLY (OUTPATIENT)
Dept: REHABILITATION | Facility: HOSPITAL | Age: 23
End: 2023-07-10

## 2023-07-10 ENCOUNTER — CLINICAL SUPPORT (OUTPATIENT)
Dept: REHABILITATION | Facility: HOSPITAL | Age: 23
End: 2023-07-10
Payer: MEDICAID

## 2023-07-10 ENCOUNTER — OFFICE VISIT (OUTPATIENT)
Dept: PHYSICAL MEDICINE AND REHAB | Facility: CLINIC | Age: 23
End: 2023-07-10
Payer: MEDICAID

## 2023-07-10 VITALS
SYSTOLIC BLOOD PRESSURE: 123 MMHG | HEART RATE: 112 BPM | DIASTOLIC BLOOD PRESSURE: 82 MMHG | WEIGHT: 293 LBS | BODY MASS INDEX: 44.41 KG/M2 | HEIGHT: 68 IN | RESPIRATION RATE: 14 BRPM

## 2023-07-10 DIAGNOSIS — Z74.09 DECREASED MOBILITY AND ENDURANCE: ICD-10-CM

## 2023-07-10 DIAGNOSIS — G57.03 PIRIFORMIS SYNDROME OF BOTH SIDES: ICD-10-CM

## 2023-07-10 DIAGNOSIS — M54.42 CHRONIC BILATERAL LOW BACK PAIN WITH BILATERAL SCIATICA: Primary | ICD-10-CM

## 2023-07-10 DIAGNOSIS — M62.5A2 MUSCLE WASTING AND ATROPHY, NOT ELSEWHERE CLASSIFIED, BACK, LUMBOSACRAL: ICD-10-CM

## 2023-07-10 DIAGNOSIS — G56.03 BILATERAL CARPAL TUNNEL SYNDROME: Primary | ICD-10-CM

## 2023-07-10 DIAGNOSIS — G89.29 CHRONIC BILATERAL LOW BACK PAIN WITH BILATERAL SCIATICA: Primary | ICD-10-CM

## 2023-07-10 DIAGNOSIS — M54.41 CHRONIC BILATERAL LOW BACK PAIN WITH BILATERAL SCIATICA: Primary | ICD-10-CM

## 2023-07-10 DIAGNOSIS — M25.60 STIFFNESS OF JOINT: ICD-10-CM

## 2023-07-10 PROCEDURE — 97110 THERAPEUTIC EXERCISES: CPT | Mod: CQ

## 2023-07-10 PROCEDURE — 95885 MUSC TST DONE W/NERV TST LIM: CPT | Mod: PBBFAC | Performed by: PHYSICAL MEDICINE & REHABILITATION

## 2023-07-10 PROCEDURE — 95913 NRV CNDJ TEST 13/> STUDIES: CPT | Mod: PBBFAC | Performed by: PHYSICAL MEDICINE & REHABILITATION

## 2023-07-10 PROCEDURE — 99499 UNLISTED E&M SERVICE: CPT | Mod: S$PBB,,, | Performed by: PHYSICAL MEDICINE & REHABILITATION

## 2023-07-10 PROCEDURE — 99999 PR PBB SHADOW E&M-EST. PATIENT-LVL III: ICD-10-PCS | Mod: PBBFAC,,, | Performed by: PHYSICAL MEDICINE & REHABILITATION

## 2023-07-10 PROCEDURE — 99213 OFFICE O/P EST LOW 20 MIN: CPT | Mod: PBBFAC,25 | Performed by: PHYSICAL MEDICINE & REHABILITATION

## 2023-07-10 PROCEDURE — 95885 PR MUSC TST DONE W/NERV TST LIM: ICD-10-PCS | Mod: 26,S$PBB,, | Performed by: PHYSICAL MEDICINE & REHABILITATION

## 2023-07-10 PROCEDURE — 99999 PR PBB SHADOW E&M-EST. PATIENT-LVL III: CPT | Mod: PBBFAC,,, | Performed by: PHYSICAL MEDICINE & REHABILITATION

## 2023-07-10 PROCEDURE — 95885 MUSC TST DONE W/NERV TST LIM: CPT | Mod: 26,S$PBB,, | Performed by: PHYSICAL MEDICINE & REHABILITATION

## 2023-07-10 PROCEDURE — 95913 PR NERVE CONDUCTION STUDY; 13 OR MORE STUDIES: ICD-10-PCS | Mod: 26,S$PBB,, | Performed by: PHYSICAL MEDICINE & REHABILITATION

## 2023-07-10 PROCEDURE — 99499 NO LOS: ICD-10-PCS | Mod: S$PBB,,, | Performed by: PHYSICAL MEDICINE & REHABILITATION

## 2023-07-10 PROCEDURE — 95913 NRV CNDJ TEST 13/> STUDIES: CPT | Mod: 26,S$PBB,, | Performed by: PHYSICAL MEDICINE & REHABILITATION

## 2023-07-10 NOTE — PROGRESS NOTES
[facility]   [address] [facility]  [place] [address]  Phone: [phone]     Full Name: Ruth Ann Oropeza YOB: 2000  Patient ID: [id number]      Visit Date: 7/10/2023 13:57  Age: 22 Years  Examining Physician:   Referring Physician:   Conclusion: uex, uriah numbness      Sensory NCS      Nerve / Sites Rec. Site Onset Lat Peak Lat NP Amp PP Amp Segments Distance Velocity     ms ms µV µV  mm m/s   R Median - Digit II (Antidromic)      Wrist Dig II 2.81 3.46 22.7 31.7 Wrist - Dig  50   L Median - Digit II (Antidromic)      Wrist Dig II 3.21 3.73 12.9 24.8 Wrist - Dig  44   R Ulnar - Digit V (Antidromic)      Wrist Dig V 2.42 3.10 9.8 16.8 Wrist - Dig V 140 58   L Ulnar - Digit V (Antidromic)      Wrist Dig V 2.35 3.21 13.1 15.9 Wrist - Dig V 140 59   R Radial - Anatomical snuff box (Forearm)      Forearm Wrist 1.65 2.19 13.1  Forearm - Wrist 100 61   L Radial - Anatomical snuff box (Forearm)      Forearm Wrist 1.71 2.19 14.4  Forearm - Wrist 100 59   R Sural - Ankle (Calf)      Calf Ankle 2.17 3.25 8.3  Calf - Ankle 140 65   R Superficial peroneal - Ankle      Lat leg Ankle 2.46 3.04 5.8  Lat leg - Ankle 140 57                       Combined Sensory Index      Nerve / Sites Rec. Site Peak Lat NP Amp PP Amp Segments Peak Diff     ms µV µV  ms   R Median - CSI      Median Thumb 3.06 16.1 22.4 Median - Radial 0.71      Radial Thumb 2.35 7.4 9.4 Median - Ulnar 0.54      Median Ring 3.71 19.7 18.2 Median palm - Ulnar palm       Ulnar Ring 3.17 5.9 15.1        CSI     CSI 1.25   L Median - CSI      Median Thumb 3.06 25.6 21.4 Median - Radial 0.83      Radial Thumb 2.23 14.7 8.5 Median - Ulnar 0.81      Median Ring 4.21 7.0 6.0 Median palm - Ulnar palm       Ulnar Ring 3.40 2.4 3.5        CSI     CSI 1.65           Motor NCS      Nerve / Sites Muscle Latency Amplitude Amp % Duration Segments Distance Lat Diff Velocity     ms mV % ms  mm ms m/s   R Median - APB       Wrist APB 3.60 8.1 100 6.94 Wrist - APB 80        Elbow APB 7.67 7.3 90.8 7.71 Elbow - Wrist 220 4.06 54   L Median - APB      Wrist APB 3.75 7.1 100 5.92 Wrist - APB 80        Elbow APB 8.27 6.6 93.6 6.54 Elbow - Wrist 220 4.52 49   R Ulnar - ADM      Wrist ADM 2.85 10.0 100 5.98 Wrist - ADM 80        B.Elbow ADM 6.92 9.2 92.2 5.73 B.Elbow - Wrist 250 4.06 62      A.Elbow ADM 9.00 6.0 60.1 6.04 A.Elbow - B.Elbow 120 2.08 58   L Ulnar - ADM      Wrist ADM 2.94 8.7 100 6.29 Wrist - ADM 80        B.Elbow ADM 7.10 7.6 87.6 6.96 B.Elbow - Wrist 240 4.17 58      A.Elbow ADM 8.88 6.8 77.5 6.77 A.Elbow - B.Elbow 110 1.77 62   R Peroneal - EDB      Ankle EDB 4.79 4.2 100 7.10 Ankle - EDB 80        Fib head EDB 11.31 4.1 98.4 8.46 Fib head - Ankle 310 6.52 48      Pop fossa EDB 12.90 3.6 85.8 7.54 Pop fossa - Fib head 70 1.58 44   R Tibial - AH      Ankle AH 5.54 4.1 100 4.10 Ankle - AH 80        Pop fossa AH 13.88 0.3 6.92 7.38 Pop fossa - Ankle 380 8.33 46                   EMG Summary Table     Spontaneous MUAP Recruitment   Muscle Nerve Roots IA Fib PSW Fasc H.F. Amp Dur. PPP Pattern   R. Rectus femoris Femoral L2-L4 N None None None None N N N N   R. Extensor digitorum brevis Tibial L5-S1 N None None None None N N N N   R. Abductor hallucis Tibial S1-S2 N None None None None N N N N     INTERPRETATION  -Bilateral median motor nerve conduction study showed normal latency, amplitude, and conduction velocity  -Bilateral median sensory nerve conduction study showed normal peak latency and amplitude  -Bilateral ulnar motor nerve conduction study showed normal latency, amplitude, and conduction velocity  -Bilateral ulnar sensory nerve conduction study showed normal peak latency and amplitude  -Bilateral radial sensory nerve conduction study showed normal peak latency and amplitude  -Bilateral combined sensory index was significant  -Right superficial peroneal sensory nerve conduction study showed normal peak latency and  amplitude  -Right sural sensory nerve conduction study showed normal peak latency and amplitude  -Right peroneal motor nerve conduction study showed normal latency, amplitude, and conduction velocity  -Right tibial motor nerve conduction study showed normal latency, amplitude, and conduction velocity  -Needle EMG examination performed to above mentioned muscles       IMPRESSION   ABNORMAL study  There is electrodiagnostic evidence of a mild demyelinating median neuropathy (Carpal tunnel syndrome) across bilateral wrists. There was no evidence of lumbar radiculopathy  There is clinical evidence of piriformis syndrome    PLAN  Follow up with referring provider  Cont PT with focus on strengthening. Consider referral to ortho for CTS. Handouts provided and wrist brace recommended  This study is good for one year. If symptoms worsen or do not improve, please re-consult.    Anna Marie Short M.D.  Physical Medicine and Rehab

## 2023-07-10 NOTE — PROGRESS NOTES
[facility]   Physical Therapy Treatment Note        Name: Ruth Ann Oropeza  Clinic Number: [id number]    Therapy Diagnosis:   Encounter Diagnoses   Name Primary?    Chronic bilateral low back pain with bilateral sciatica Yes    Muscle wasting and atrophy, not elsewhere classified, back, lumbosacral     Decreased mobility and endurance     Stiffness of joint      Physician: Micheline Rosas MD    Visit Date: 7/10/2023    Physician Orders: PT Eval and Treat  Medical Diagnosis from Referral: Radiculopathy of thoracolumbar region  Evaluation Date: 6/22/2023  Authorization Period Expiration: 05/30/2024  Plan of Care Expiration: 08/17/2023  Progress Note Due: 07/22/2023  Visit # / Visits authorized: 1/25 (+ evaluation)   FOTO: 1/3 (last performed on 6/22/2023)     Precautions: Standard and asthma  PTA Visit #: 1/5     Time In: 1610 (late arrival)  Time Out: 1645  Total Billable Time: 35 minutes Billing reflects [place] medicaid guidelines, billing all therapy as therapeutic-exercise     Subjective     Patient reports: pain is usually on her right side but sometimes in the middle of her low back. Had an unscheduled steroid injection after evaluation due to her back pain (pain unrelated to evaluation).     She was partially compliant with home exercise program. Some relief after home exercise program but pain quickly returns.    Response to previous treatment: a little better after evaluation    Functional change: bending forward, standing tolerance limited to 5 minutes, walking tolerance limited to 20 minutes, unable to perform floor transfer    Pain: 7/10     Location: Right and middle low back    Objective      Objective Measures updated at progress report or POC update only unless otherwise noted.       Treatment     Ruth Ann received the treatments listed below:     MANUAL THERAPY TECHNIQUES were applied for (0) minutes, including:    Manual Intervention Performed Today    Soft Tissue  Mobilization []    Joint Mobilizations []     []     []    Functional Dry Needling  []      Plan for Next Visit: Continue as needed       THERAPEUTIC EXERCISES to develop strength, endurance, ROM, flexibility, posture, and core stabilization for (35) minutes including:    Intervention Performed Today    nustep     Glut sets x 3 minutes 3 seconds    Bridges x Attempted but unable due to increased pain with initiating movement   Posterior pelvic tilt  x 3 minutes 3 seconds hold   Hip adduction with ball x 3 minutes 3 seconds hold   Hip abduction with belt x 3 minutes 3 seconds hold   Abdominal brace  x X 20 sitting green swiss ball in lap arms on top   Sit with good posture x 5 x 15 seconds      Plan for Next Visit:        Patient Education and Home Exercises       Home Exercises Provided and Patient Education Provided     Education provided: (during session) minutes  PURPOSE: Patient educated on the impairments noted above and the effects of physical therapy intervention to improve overall condition and QOL.   EXERCISE: Patient was educated on all the above exercise prior/during/after for proper posture, positioning, and execution for safe performance with home exercise program.   STRENGTH: Patient educated on the importance of improved core and extremity strength in order to improve alignment of the spine and extremities with static positions and dynamic movement.     Written Home Exercises Provided: yes.  Exercises were reviewed and Ruth Ann was able to demonstrate them prior to the end of the session.  Ruth Ann demonstrated good  understanding of the education provided. See EMR under Patient Instructions for exercises provided during therapy sessions.    Assessment     Patient tolerated this session with fatigue and required increased verbal cues for the correct performance of sitting abdominal brace.     Ruth Ann is progressing well towards her goals.   Patient prognosis is Good.     Patient will continue to benefit  from skilled outpatient physical therapy to address the deficits listed in the problem list box on initial evaluation, provide pt/family education and to maximize patient's level of independence in the home and community environment.     Patient's spiritual, cultural and educational needs considered and pt agreeable to plan of care and goals.     Anticipated Barriers for therapy: co-morbidities and sedentary lifestyle, BMI over 50       Short Term Goals:  4 weeks Status  Date Met   PAIN: Pt will report worst pain of 7/10 in order to progress toward max functional ability and improve quality of life. [x] Progressing  [] Met  [] Not Met     FUNCTION: Patient will demonstrate improved function as indicated by a score of greater than or equal to 38 out of 100 on FOTO. [x] Progressing  [] Met  [] Not Met     MOBILITY: Patient will improve AROM to 50% of stated goals, listed in objective measures above, in order to progress towards independence with functional activities.  [x] Progressing  [] Met  [] Not Met     STRENGTH: Patient will improve strength to 50% of stated goals, listed in objective measures above, in order to progress towards independence with functional activities. [x] Progressing  [] Met  [] Not Met     POSTURE: Patient will correct postural deviations in sitting and standing, to decrease pain and promote long term stability.  [x] Progressing  [] Met  [] Not Met     GAIT: Patient will demonstrate improved gait mechanics including normalize gait in order to improve functional mobility, improve quality of life, and decrease risk of further injury or fall.  [x] Progressing  [] Met  [] Not Met     HEP: Patient will demonstrate independence with HEP in order to progress toward functional independence. [x] Progressing  [] Met  [] Not Met     Patient will be able to stand for 30 minutes with 4/10 pain.  [x] Progressing  [] Met  [] Not Met        Long Term Goals:  8 weeks Status Date Met   PAIN: Pt will report  worst pain of 3/10 in order to progress toward max functional ability and improve quality of life [x] Progressing  [] Met  [] Not Met     FUNCTION: Patient will demonstrate improved function as indicated by a score of greater than or equal to 50 out of 100 on FOTO. [x] Progressing  [] Met  [] Not Met     MOBILITY: Patient will improve AROM to stated goals, listed in objective measures above, in order to return to maximal functional potential and improve quality of life.  [x] Progressing  [] Met  [] Not Met     STRENGTH: Patient will improve strength to stated goals, listed in objective measures above, in order to improve functional independence and quality of life.  [x] Progressing  [] Met  [] Not Met     GAIT: Patient will demonstrate normalized gait mechanics with minimal compensation in order to return to PLOF. [x] Progressing  [] Met  [] Not Met     Patient will return to normal ADL's, IADL's, community involvement, recreational activities, and work-related activities with less than or equal to 3/10 pain and maximal function.  [x] Progressing  [] Met  [] Not Met     Patient will be able to stand for 1 hour with 3/10 pain.  [x] Progressing  [] Met  [] Not Met           Plan     Continue Plan of Care (POC) and progress per patient tolerance. See treatment section for details on planned progressions next session.      Vince Qureshi, PTA

## 2023-07-10 NOTE — PROGRESS NOTES
PT/PTA met face to face to discuss pt's treatment plan and progress towards established goals. Pt will be seen by a physical therapist minimally every 6th visit or every 30 days.        Vince Qureshi PTA

## 2023-07-11 ENCOUNTER — CLINICAL SUPPORT (OUTPATIENT)
Dept: REHABILITATION | Facility: HOSPITAL | Age: 23
End: 2023-07-11
Payer: MEDICAID

## 2023-07-11 DIAGNOSIS — M54.41 CHRONIC BILATERAL LOW BACK PAIN WITH BILATERAL SCIATICA: Primary | ICD-10-CM

## 2023-07-11 DIAGNOSIS — Z74.09 DECREASED MOBILITY AND ENDURANCE: ICD-10-CM

## 2023-07-11 DIAGNOSIS — M62.5A2 MUSCLE WASTING AND ATROPHY, NOT ELSEWHERE CLASSIFIED, BACK, LUMBOSACRAL: ICD-10-CM

## 2023-07-11 DIAGNOSIS — M54.42 CHRONIC BILATERAL LOW BACK PAIN WITH BILATERAL SCIATICA: Primary | ICD-10-CM

## 2023-07-11 DIAGNOSIS — M25.60 STIFFNESS OF JOINT: ICD-10-CM

## 2023-07-11 DIAGNOSIS — G89.29 CHRONIC BILATERAL LOW BACK PAIN WITH BILATERAL SCIATICA: Primary | ICD-10-CM

## 2023-07-11 PROCEDURE — 97110 THERAPEUTIC EXERCISES: CPT | Mod: CQ

## 2023-07-11 NOTE — PROGRESS NOTES
[facility]   Physical Therapy Treatment Note        Name: Ruth Ann Oropeza  Clinic Number: [id number]    Therapy Diagnosis:   Encounter Diagnoses   Name Primary?    Chronic bilateral low back pain with bilateral sciatica Yes    Muscle wasting and atrophy, not elsewhere classified, back, lumbosacral     Decreased mobility and endurance     Stiffness of joint      Physician: Micheline Rosas MD    Visit Date: 7/11/2023    Physician Orders: PT Eval and Treat  Medical Diagnosis from Referral: Radiculopathy of thoracolumbar region  Evaluation Date: 6/22/2023  Authorization Period Expiration: 05/30/2024  Plan of Care Expiration: 08/17/2023  Progress Note Due: 07/22/2023  Visit # / Visits authorized: 2/25 (+ evaluation)   FOTO: 1/3 (last performed on 6/22/2023)     Precautions: Standard and asthma  PTA Visit #: 2/5     Time In: 1610  Time Out: 1710  Total Billable Time: 55 minutes Billing reflects [place] medicaid guidelines, billing all therapy as therapeutic-exercise     Subjective     Patient reports: pain is on her right low back today.      She was compliant with home exercise program. Some relief after home exercise program but pain quickly returns.    Response to previous treatment: a little better after last treatment    Functional change: bending forward, standing tolerance limited to 5 minutes, walking tolerance limited to 20 minutes, unable to perform floor transfer, limited tolerance to supine    Pain: 6/10     Location: Right and middle low back    Objective      Objective Measures updated at progress report or POC update only unless otherwise noted.       Treatment     Ruth Ann received the treatments listed below:     MANUAL THERAPY TECHNIQUES were applied for (0) minutes, including:    Manual Intervention Performed Today    Soft Tissue Mobilization []    Joint Mobilizations []     []     []    Functional Dry Needling  []      Plan for Next Visit: Continue as needed        THERAPEUTIC EXERCISES to develop strength, endurance, ROM, flexibility, posture, and core stabilization for (55) minutes including:    Intervention Performed Today    nustep x 5 minutes    Glut sets x 3 minutes 3 seconds    Bridges  Attempted but unable due to increased pain with initiating movement   Posterior pelvic tilt  x 3 minutes 3 seconds hold   Hip adduction with ball x 3 minutes 3 seconds hold supine   Hip abduction with belt x 3 minutes 3 seconds hold supine   Abdominal brace   X 20 sitting green swiss ball in lap arms on top   Abdominal brace with shoulder flexion X  2 x 10 bilateral upper extremity at the same time   Sit with good posture x 10 x 10 seconds sitting on black mat with airex   Straight leg raise      Hip flexion  x 2 x 10 Sitting with good posture sitting on black mat with airex   Hip flexion with opposite shoulder flexion x 2 x 10 good posture sitting on black mat with airex   Long arch quad  x 2 x 10 Sitting with good posture sitting on black mat with airex   Side steps     Payloff press x 2 x 10 sitting red band bilateral     Scapula retraction            Plan for Next Visit:      Patient lays supine with blue wedge for comfort for all mat exercises.     Patient Education and Home Exercises       Home Exercises Provided and Patient Education Provided     Education provided: (during session) minutes  PURPOSE: Patient educated on the impairments noted above and the effects of physical therapy intervention to improve overall condition and QOL.   EXERCISE: Patient was educated on all the above exercise prior/during/after for proper posture, positioning, and execution for safe performance with home exercise program.   STRENGTH: Patient educated on the importance of improved core and extremity strength in order to improve alignment of the spine and extremities with static positions and dynamic movement.   7/11/2023: added home exercise program to my chart and gave patient paper copy discussed  how to progress within tolerance. Discussed to avoid going to gym at this time and instead to oziel in her effort on performing home exercise program on a regular schedule.     Written Home Exercises Provided: yes.  Exercises were reviewed and Ruth Ann was able to demonstrate them prior to the end of the session.  Ruth Ann demonstrated good  understanding of the education provided. See EMR under Patient Instructions for exercises provided during therapy sessions.    Assessment     Patient appeared to be fatigue with nustep activity but completed without rest. She tolerated this session with fatigue and required verbal cues to respect her fatigue and to rest when necessary allowing for muscle recovery. By the end of this session, she was able to recognize her fatigue and stop exercises to rest appropriately. She was able to progressed with core strengthening exercises in sitting and in supine on wedge.      Ruth Ann is progressing well towards her goals.   Patient prognosis is Good.     Patient will continue to benefit from skilled outpatient physical therapy to address the deficits listed in the problem list box on initial evaluation, provide pt/family education and to maximize patient's level of independence in the home and community environment.     Patient's spiritual, cultural and educational needs considered and pt agreeable to plan of care and goals.     Anticipated Barriers for therapy: co-morbidities and sedentary lifestyle, BMI over 50       Short Term Goals:  4 weeks Status  Date Met   PAIN: Pt will report worst pain of 7/10 in order to progress toward max functional ability and improve quality of life. [x] Progressing  [] Met  [] Not Met     FUNCTION: Patient will demonstrate improved function as indicated by a score of greater than or equal to 38 out of 100 on FOTO. [x] Progressing  [] Met  [] Not Met     MOBILITY: Patient will improve AROM to 50% of stated goals, listed in objective measures above, in order to  progress towards independence with functional activities.  [x] Progressing  [] Met  [] Not Met     STRENGTH: Patient will improve strength to 50% of stated goals, listed in objective measures above, in order to progress towards independence with functional activities. [x] Progressing  [] Met  [] Not Met     POSTURE: Patient will correct postural deviations in sitting and standing, to decrease pain and promote long term stability.  [x] Progressing  [] Met  [] Not Met     GAIT: Patient will demonstrate improved gait mechanics including normalize gait in order to improve functional mobility, improve quality of life, and decrease risk of further injury or fall.  [x] Progressing  [] Met  [] Not Met     HEP: Patient will demonstrate independence with HEP in order to progress toward functional independence. [x] Progressing  [] Met  [] Not Met     Patient will be able to stand for 30 minutes with 4/10 pain.  [x] Progressing  [] Met  [] Not Met        Long Term Goals:  8 weeks Status Date Met   PAIN: Pt will report worst pain of 3/10 in order to progress toward max functional ability and improve quality of life [x] Progressing  [] Met  [] Not Met     FUNCTION: Patient will demonstrate improved function as indicated by a score of greater than or equal to 50 out of 100 on FOTO. [x] Progressing  [] Met  [] Not Met     MOBILITY: Patient will improve AROM to stated goals, listed in objective measures above, in order to return to maximal functional potential and improve quality of life.  [x] Progressing  [] Met  [] Not Met     STRENGTH: Patient will improve strength to stated goals, listed in objective measures above, in order to improve functional independence and quality of life.  [x] Progressing  [] Met  [] Not Met     GAIT: Patient will demonstrate normalized gait mechanics with minimal compensation in order to return to PLOF. [x] Progressing  [] Met  [] Not Met     Patient will return to normal ADL's, IADL's, community  involvement, recreational activities, and work-related activities with less than or equal to 3/10 pain and maximal function.  [x] Progressing  [] Met  [] Not Met     Patient will be able to stand for 1 hour with 3/10 pain.  [x] Progressing  [] Met  [] Not Met           Plan     Continue Plan of Care (POC) and progress per patient tolerance. See treatment section for details on planned progressions next session.      Vince Qureshi, PTA

## 2023-07-12 ENCOUNTER — TELEPHONE (OUTPATIENT)
Dept: OBSTETRICS AND GYNECOLOGY | Facility: CLINIC | Age: 23
End: 2023-07-12
Payer: MEDICAID

## 2023-07-12 NOTE — TELEPHONE ENCOUNTER
----- Message from Naty Garcia sent at 7/12/2023  2:25 PM CDT -----  Pt is calling in regards to getting a new prescription for her sertraline (ZOLOFT) medication. Pharmacy is needing a 150mg prescription.        [facility] - [place] - [address]  [address]  [place] [address]  Phone: [phone] Fax: [phone]           Thanks KB

## 2023-07-12 NOTE — TELEPHONE ENCOUNTER
Spoke with patient, notified will send in 1 refill  and will need to follow up with pcp for further refills.

## 2023-07-13 DIAGNOSIS — F41.9 ANXIETY AND DEPRESSION: Primary | ICD-10-CM

## 2023-07-13 DIAGNOSIS — F32.A ANXIETY AND DEPRESSION: Primary | ICD-10-CM

## 2023-07-13 RX ORDER — SERTRALINE HYDROCHLORIDE 50 MG/1
150 TABLET, FILM COATED ORAL DAILY
Qty: 90 TABLET | Refills: 0 | Status: SHIPPED | OUTPATIENT
Start: 2023-07-13 | End: 2024-07-12

## 2023-07-16 DIAGNOSIS — G56.03 BILATERAL CARPAL TUNNEL SYNDROME: Primary | ICD-10-CM

## 2023-07-18 ENCOUNTER — CLINICAL SUPPORT (OUTPATIENT)
Dept: REHABILITATION | Facility: HOSPITAL | Age: 23
End: 2023-07-18
Payer: MEDICAID

## 2023-07-18 DIAGNOSIS — M54.42 CHRONIC BILATERAL LOW BACK PAIN WITH BILATERAL SCIATICA: Primary | ICD-10-CM

## 2023-07-18 DIAGNOSIS — M54.41 CHRONIC BILATERAL LOW BACK PAIN WITH BILATERAL SCIATICA: Primary | ICD-10-CM

## 2023-07-18 DIAGNOSIS — M62.5A2 MUSCLE WASTING AND ATROPHY, NOT ELSEWHERE CLASSIFIED, BACK, LUMBOSACRAL: ICD-10-CM

## 2023-07-18 DIAGNOSIS — M25.60 STIFFNESS OF JOINT: ICD-10-CM

## 2023-07-18 DIAGNOSIS — G89.29 CHRONIC BILATERAL LOW BACK PAIN WITH BILATERAL SCIATICA: Primary | ICD-10-CM

## 2023-07-18 DIAGNOSIS — Z74.09 DECREASED MOBILITY AND ENDURANCE: ICD-10-CM

## 2023-07-18 PROCEDURE — 97110 THERAPEUTIC EXERCISES: CPT | Mod: CQ

## 2023-07-18 NOTE — PROGRESS NOTES
[facility]   Physical Therapy Treatment Note        Name: Ruth Ann Oropeza  Clinic Number: [id number]    Therapy Diagnosis:   Encounter Diagnoses   Name Primary?    Chronic bilateral low back pain with bilateral sciatica Yes    Muscle wasting and atrophy, not elsewhere classified, back, lumbosacral     Decreased mobility and endurance     Stiffness of joint      Physician: Micheline Rosas MD    Visit Date: 7/18/2023    Physician Orders: PT Eval and Treat  Medical Diagnosis from Referral: Radiculopathy of thoracolumbar region  Evaluation Date: 6/22/2023  Authorization Period Expiration: 05/30/2024  Plan of Care Expiration: 08/17/2023  Progress Note Due: 07/22/2023  Visit # / Visits authorized: 3/25 (+ evaluation)   FOTO: 1/3 (last performed on 6/22/2023)     Precautions: Standard and asthma  PTA Visit #: 3/5     Time In: 1635  Time Out: 1710  Total Billable Time: 35 minutes Billing reflects [place] medicaid guidelines, billing all therapy as therapeutic-exercise     Subjective     Patient reports: pain is in the middle low back today.      She was compliant with home exercise program. Some relief after home exercise program but pain quickly returns.    Response to previous treatment: a little better after last treatment    Functional change: bending forward, standing tolerance limited to 5 minutes, walking tolerance limited to 20 minutes, unable to perform floor transfer, limited tolerance to supine    Pain: 6/10     Location: middle low back    Objective      Objective Measures updated at progress report or POC update only unless otherwise noted.       Treatment     Ruth Ann received the treatments listed below:     MANUAL THERAPY TECHNIQUES were applied for (0) minutes, including:    Manual Intervention Performed Today    Soft Tissue Mobilization []    Joint Mobilizations []     []     []    Functional Dry Needling  []      Plan for Next Visit: Continue as needed       THERAPEUTIC  EXERCISES to develop strength, endurance, ROM, flexibility, posture, and core stabilization for (35) minutes including:    Patient lays supine with blue wedge for comfort for all mat exercises.    Intervention Performed Today    nustep x 5 minutes    Glut sets  3 minutes 3 seconds    Bridges  Attempted but unable due to increased pain with initiating movement   Posterior pelvic tilt  x 3 minutes 5 seconds hold   Hip adduction with ball x 3 minutes 5 seconds hold supine   Hip abduction with belt  3 minutes 3 seconds hold supine   Abdominal brace   X 20 sitting green swiss ball in lap arms on top   Abdominal brace with shoulder flexion  2 x 10 bilateral upper extremity at the same time   Sit with good posture  10 x 10 seconds sitting on black mat with airex   Straight leg raise with abdominal brace  x 3 x 10 bilateral    Hip flexion  x 2 x 10 Sitting with good posture sitting on black mat with airex   Hip flexion with opposite shoulder flexion  2 x 10 good posture sitting on black mat with airex   Long arch quad  x 2 x 10 Sitting with good posture sitting on black mat with airex   Side steps x 2 minutes    Payloff press x 3 x 10 standing red band bilateral     Scapula retraction x 3 x 10 red band (added)          Plan for Next Visit:         Patient Education and Home Exercises       Home Exercises Provided and Patient Education Provided     Education provided: (during session) minutes  PURPOSE: Patient educated on the impairments noted above and the effects of physical therapy intervention to improve overall condition and QOL.   EXERCISE: Patient was educated on all the above exercise prior/during/after for proper posture, positioning, and execution for safe performance with home exercise program.   STRENGTH: Patient educated on the importance of improved core and extremity strength in order to improve alignment of the spine and extremities with static positions and dynamic movement.   7/11/2023: added home exercise  program to my chart and gave patient paper copy discussed how to progress within tolerance. Discussed to avoid going to gym at this time and instead to oziel in her effort on performing home exercise program on a regular schedule.     Written Home Exercises Provided: yes.  Exercises were reviewed and Ruth Ann was able to demonstrate them prior to the end of the session.  Ruth Ann demonstrated good  understanding of the education provided. See EMR under Patient Instructions for exercises provided during therapy sessions.    Assessment     Patient tolerated nustep with less fatigue and with decreased difficulty to completed this activity. She was able to progressed with core strengthening exercises in standing. She continues to fatigue quickly but also recovers quickly.      Ruth Ann is progressing well towards her goals.   Patient prognosis is Good.     Patient will continue to benefit from skilled outpatient physical therapy to address the deficits listed in the problem list box on initial evaluation, provide pt/family education and to maximize patient's level of independence in the home and community environment.     Patient's spiritual, cultural and educational needs considered and pt agreeable to plan of care and goals.     Anticipated Barriers for therapy: co-morbidities and sedentary lifestyle, BMI over 50       Short Term Goals:  4 weeks Status  Date Met   PAIN: Pt will report worst pain of 7/10 in order to progress toward max functional ability and improve quality of life. [x] Progressing  [] Met  [] Not Met     FUNCTION: Patient will demonstrate improved function as indicated by a score of greater than or equal to 38 out of 100 on FOTO. [x] Progressing  [] Met  [] Not Met     MOBILITY: Patient will improve AROM to 50% of stated goals, listed in objective measures above, in order to progress towards independence with functional activities.  [x] Progressing  [] Met  [] Not Met     STRENGTH: Patient will improve  strength to 50% of stated goals, listed in objective measures above, in order to progress towards independence with functional activities. [x] Progressing  [] Met  [] Not Met     POSTURE: Patient will correct postural deviations in sitting and standing, to decrease pain and promote long term stability.  [x] Progressing  [] Met  [] Not Met     GAIT: Patient will demonstrate improved gait mechanics including normalize gait in order to improve functional mobility, improve quality of life, and decrease risk of further injury or fall.  [x] Progressing  [] Met  [] Not Met     HEP: Patient will demonstrate independence with HEP in order to progress toward functional independence. [x] Progressing  [] Met  [] Not Met     Patient will be able to stand for 30 minutes with 4/10 pain.  [x] Progressing  [] Met  [] Not Met        Long Term Goals:  8 weeks Status Date Met   PAIN: Pt will report worst pain of 3/10 in order to progress toward max functional ability and improve quality of life [x] Progressing  [] Met  [] Not Met     FUNCTION: Patient will demonstrate improved function as indicated by a score of greater than or equal to 50 out of 100 on FOTO. [x] Progressing  [] Met  [] Not Met     MOBILITY: Patient will improve AROM to stated goals, listed in objective measures above, in order to return to maximal functional potential and improve quality of life.  [x] Progressing  [] Met  [] Not Met     STRENGTH: Patient will improve strength to stated goals, listed in objective measures above, in order to improve functional independence and quality of life.  [x] Progressing  [] Met  [] Not Met     GAIT: Patient will demonstrate normalized gait mechanics with minimal compensation in order to return to PLOF. [x] Progressing  [] Met  [] Not Met     Patient will return to normal ADL's, IADL's, community involvement, recreational activities, and work-related activities with less than or equal to 3/10 pain and maximal function.  [x]  Progressing  [] Met  [] Not Met     Patient will be able to stand for 1 hour with 3/10 pain.  [x] Progressing  [] Met  [] Not Met           Plan     Continue Plan of Care (POC) and progress per patient tolerance. See treatment section for details on planned progressions next session.      Vince Qureshi, PTA

## 2023-07-20 ENCOUNTER — TELEPHONE (OUTPATIENT)
Dept: REHABILITATION | Facility: HOSPITAL | Age: 23
End: 2023-07-20
Payer: MEDICAID

## 2023-07-20 ENCOUNTER — DOCUMENTATION ONLY (OUTPATIENT)
Dept: REHABILITATION | Facility: HOSPITAL | Age: 23
End: 2023-07-20

## 2023-07-20 NOTE — TELEPHONE ENCOUNTER
Called regarding missing appointment today. Patient states she forgot about her appointment today. Confirmed next appointment with patient.

## 2023-07-24 ENCOUNTER — PATIENT MESSAGE (OUTPATIENT)
Dept: OBSTETRICS AND GYNECOLOGY | Facility: CLINIC | Age: 23
End: 2023-07-24
Payer: MEDICAID

## 2023-07-25 NOTE — TELEPHONE ENCOUNTER
Scheduled pt with AC at [facility] on 7/25 at 8:30 am. Patient stated that she is worried about various things as discussed in message. Informed pt that she would be able to discuss at her appt with AC. Pt verbalized understanding and agreed to appt date, time, and location.

## 2023-07-26 ENCOUNTER — OFFICE VISIT (OUTPATIENT)
Dept: OBSTETRICS AND GYNECOLOGY | Facility: CLINIC | Age: 23
End: 2023-07-26
Payer: MEDICAID

## 2023-07-26 VITALS
BODY MASS INDEX: 44.41 KG/M2 | HEIGHT: 68 IN | SYSTOLIC BLOOD PRESSURE: 122 MMHG | DIASTOLIC BLOOD PRESSURE: 80 MMHG | WEIGHT: 293 LBS

## 2023-07-26 DIAGNOSIS — Z32.00 POSSIBLE PREGNANCY: Primary | ICD-10-CM

## 2023-07-26 DIAGNOSIS — N89.8 VAGINAL DISCHARGE: ICD-10-CM

## 2023-07-26 DIAGNOSIS — N92.6 IRREGULAR PERIODS: ICD-10-CM

## 2023-07-26 LAB
B-HCG UR QL: NEGATIVE
CTP QC/QA: YES

## 2023-07-26 PROCEDURE — 81025 URINE PREGNANCY TEST: CPT | Mod: PBBFAC

## 2023-07-26 PROCEDURE — 99212 PR OFFICE/OUTPT VISIT, EST, LEVL II, 10-19 MIN: ICD-10-PCS | Mod: S$PBB,,,

## 2023-07-26 PROCEDURE — 3074F PR MOST RECENT SYSTOLIC BLOOD PRESSURE < 130 MM HG: ICD-10-PCS | Mod: CPTII,,,

## 2023-07-26 PROCEDURE — 99213 OFFICE O/P EST LOW 20 MIN: CPT | Mod: PBBFAC

## 2023-07-26 PROCEDURE — 3008F PR BODY MASS INDEX (BMI) DOCUMENTED: ICD-10-PCS | Mod: CPTII,,,

## 2023-07-26 PROCEDURE — 99999 PR PBB SHADOW E&M-EST. PATIENT-LVL III: ICD-10-PCS | Mod: PBBFAC,,,

## 2023-07-26 PROCEDURE — 99212 OFFICE O/P EST SF 10 MIN: CPT | Mod: S$PBB,,,

## 2023-07-26 PROCEDURE — 3008F BODY MASS INDEX DOCD: CPT | Mod: CPTII,,,

## 2023-07-26 PROCEDURE — 1159F MED LIST DOCD IN RCRD: CPT | Mod: CPTII,,,

## 2023-07-26 PROCEDURE — 99999PBSHW PR PBB SHADOW TECHNICAL ONLY FILED TO HB: Mod: PBBFAC,,,

## 2023-07-26 PROCEDURE — 3044F PR MOST RECENT HEMOGLOBIN A1C LEVEL <7.0%: ICD-10-PCS | Mod: CPTII,,,

## 2023-07-26 PROCEDURE — 3044F HG A1C LEVEL LT 7.0%: CPT | Mod: CPTII,,,

## 2023-07-26 PROCEDURE — 1159F PR MEDICATION LIST DOCUMENTED IN MEDICAL RECORD: ICD-10-PCS | Mod: CPTII,,,

## 2023-07-26 PROCEDURE — 99999PBSHW POCT URINE PREGNANCY: Mod: PBBFAC,,,

## 2023-07-26 PROCEDURE — 99999PBSHW PR PBB SHADOW TECHNICAL ONLY FILED TO HB: ICD-10-PCS | Mod: PBBFAC,,,

## 2023-07-26 PROCEDURE — 3079F PR MOST RECENT DIASTOLIC BLOOD PRESSURE 80-89 MM HG: ICD-10-PCS | Mod: CPTII,,,

## 2023-07-26 PROCEDURE — 81514 NFCT DS BV&VAGINITIS DNA ALG: CPT

## 2023-07-26 PROCEDURE — 3074F SYST BP LT 130 MM HG: CPT | Mod: CPTII,,,

## 2023-07-26 PROCEDURE — 99999 PR PBB SHADOW E&M-EST. PATIENT-LVL III: CPT | Mod: PBBFAC,,,

## 2023-07-26 PROCEDURE — 3079F DIAST BP 80-89 MM HG: CPT | Mod: CPTII,,,

## 2023-07-26 RX ORDER — NITROFURANTOIN 25; 75 MG/1; MG/1
100 CAPSULE ORAL 2 TIMES DAILY
COMMUNITY
Start: 2023-07-23 | End: 2023-12-06

## 2023-07-26 NOTE — PROGRESS NOTES
Subjective:      Patient ID: Ruth Ann Oropeza is a 22 y.o. female.    Chief Complaint:  late menses, pcos , and Vaginal Discharge      History of Present Illness  HPI    Patient presents with several concerns today.     Patient states that her period is late, LMP 23. Home UPT negative, negative in office today as well. Hx of c/s delivery 23. Is cramping today    2.   Patient reports yellow vaginal diacharge first noted a few days ago, would like to be swabbed. No new partners.    3.   Concerned about PCOS/endometriosis, menstrual cramps have been worse after having baby. Denies hirsutism, insulin resistance negative. Monthly menses prior to pregnancy.     4.   Resumed menses May 28, 2023 and they are not yet predictable. May 28- and again -. Not breast feeding.         GYN & OB History  Patient's last menstrual period was 2023 (exact date).   Date of Last Pap: No result found    OB History    Para Term  AB Living   1 1 1     1   SAB IAB Ectopic Multiple Live Births         0 1      # Outcome Date GA Lbr David/2nd Weight Sex Delivery Anes PTL Lv   1 Term 23 38w2d  3.31 kg (7 lb 4.8 oz) M CS-LTranv EPI N ZULY      Complications: Fetal Intolerance       Review of Systems  Review of Systems   Constitutional:  Positive for fatigue. Negative for chills and fever.   Genitourinary:  Positive for dysmenorrhea, menorrhagia, menstrual problem and vaginal discharge. Negative for dyspareunia, dysuria, frequency, pelvic pain, urgency, vaginal pain, urinary incontinence and vaginal odor.   All other systems reviewed and are negative.       Objective:     Physical Exam:   Constitutional: She is oriented to person, place, and time. She appears well-developed and well-nourished. No distress.    HENT:   Head: Normocephalic and atraumatic.    Eyes: Pupils are equal, round, and reactive to light. Conjunctivae and EOM are normal.     Cardiovascular:  Normal rate.              Pulmonary/Chest: Effort normal.        Abdominal: Soft. She exhibits no distension. There is no abdominal tenderness. There is no rebound and no guarding. Hernia confirmed negative in the right inguinal area and confirmed negative in the left inguinal area.     Genitourinary:    Inguinal canal, uterus, right adnexa and left adnexa normal.   Rectum:      No external hemorrhoid.      Pelvic exam was performed with patient supine.   The external female genitalia was normal.   No external genitalia lesions identified,Genitalia hair distrobution normal .   Labial bartholins normal.There is no rash, tenderness, lesion or injury on the right labia. There is no rash, tenderness, lesion or injury on the left labia. Cervix is normal. Right adnexum displays no mass, no tenderness and no fullness. Left adnexum displays no mass, no tenderness and no fullness. There is vaginal discharge (thin, yellow, mucoid) in the vagina. No erythema, tenderness or bleeding in the vagina.    No foreign body in the vagina.      No signs of injury in the vagina.   Cervix exhibits no motion tenderness, no lesion, no friability, no lesion, no tenderness and no polyp. Uerus contour normal  Uterus is not enlarged and not tender. Normal urethral meatus.Urethral Meatus exhibits: urethral lesion and prolapsedUrethra findings: no urethral mass, no tenderness and no urethral scarringBladder findings: no bladder distention and no bladder tenderness          Musculoskeletal: Normal range of motion and moves all extremeties.      Lymphadenopathy: No inguinal adenopathy noted on the right or left side.    Neurological: She is alert and oriented to person, place, and time.    Skin: Skin is warm and dry. No rash noted. She is not diaphoretic. No erythema. No pallor.    Psychiatric: She has a normal mood and affect. Her behavior is normal. Judgment and thought content normal.       Assessment:     1. Possible pregnancy    2. Vaginal discharge    3. Irregular  periods          Upt negative     Plan:     Possible pregnancy  -     POCT Urine Pregnancy    Vaginal discharge  -     VAGINOSIS SCREEN BY DNA PROBE    Irregular periods    Patient reassured, advised that it can take several months for menses to resume normal patterns after pregnancy and delivery.   Patient states that hormonal birth control is not desired so use barrier protection if pregnancy is not desired.     Cultures to follow and treat as indicated.           Follow up as needed.

## 2023-07-27 ENCOUNTER — CLINICAL SUPPORT (OUTPATIENT)
Dept: REHABILITATION | Facility: HOSPITAL | Age: 23
End: 2023-07-27
Payer: MEDICAID

## 2023-07-27 ENCOUNTER — PATIENT MESSAGE (OUTPATIENT)
Dept: OBSTETRICS AND GYNECOLOGY | Facility: CLINIC | Age: 23
End: 2023-07-27
Payer: MEDICAID

## 2023-07-27 DIAGNOSIS — G89.29 CHRONIC BILATERAL LOW BACK PAIN WITH BILATERAL SCIATICA: Primary | ICD-10-CM

## 2023-07-27 DIAGNOSIS — M54.41 CHRONIC BILATERAL LOW BACK PAIN WITH BILATERAL SCIATICA: Primary | ICD-10-CM

## 2023-07-27 DIAGNOSIS — Z74.09 DECREASED MOBILITY AND ENDURANCE: ICD-10-CM

## 2023-07-27 DIAGNOSIS — M25.60 STIFFNESS OF JOINT: ICD-10-CM

## 2023-07-27 DIAGNOSIS — M62.5A2 MUSCLE WASTING AND ATROPHY, NOT ELSEWHERE CLASSIFIED, BACK, LUMBOSACRAL: ICD-10-CM

## 2023-07-27 DIAGNOSIS — M54.42 CHRONIC BILATERAL LOW BACK PAIN WITH BILATERAL SCIATICA: Primary | ICD-10-CM

## 2023-07-27 LAB
BACTERIAL VAGINOSIS DNA: NEGATIVE
CANDIDA GLABRATA DNA: NEGATIVE
CANDIDA KRUSEI DNA: NEGATIVE
CANDIDA RRNA VAG QL PROBE: NEGATIVE
T VAGINALIS RRNA GENITAL QL PROBE: NEGATIVE

## 2023-07-27 PROCEDURE — 97110 THERAPEUTIC EXERCISES: CPT

## 2023-07-27 NOTE — PROGRESS NOTES
[facility]   Physical Therapy Treatment Note        Name: Ruth Ann Oropeza  Clinic Number: [id number]    Therapy Diagnosis:   Encounter Diagnoses   Name Primary?    Chronic bilateral low back pain with bilateral sciatica Yes    Muscle wasting and atrophy, not elsewhere classified, back, lumbosacral     Decreased mobility and endurance     Stiffness of joint      Physician: Micheline Rosas MD    Visit Date: 7/27/2023    Physician Orders: PT Eval and Treat  Medical Diagnosis from Referral: Radiculopathy of thoracolumbar region  Evaluation Date: 6/22/2023  Authorization Period Expiration: 05/30/2024  Plan of Care Expiration: 08/17/2023  Progress Note Due: 07/22/2023  Visit # / Visits authorized: 4/25 (+ evaluation)   FOTO: 1/3 (last performed on 6/22/2023)     Precautions: Standard and asthma  PTA Visit #: 3/5     Time In: 1701  Time Out: 1742  Total Billable Time: 41 minutes Billing reflects [place] medicaid guidelines, billing all therapy as therapeutic-exercise     Subjective     Patient reports: she has been having continued pain in the low back and radiating down bilateral lower extremity. Patient states she feels she is about the same as when she started. Patient states she does not have time to complete home exercise plan.     She was not compliant with home exercise program.     Response to previous treatment: feels about the same     Functional change: bending forward, standing tolerance limited to 5 minutes, walking tolerance limited to 20 minutes, unable to perform floor transfer, limited tolerance to supine    Pain: 6/10     Location: middle low back    Objective      Objective Measures updated at progress report or POC update only unless otherwise noted.       Treatment     Ruth Ann received the treatments listed below:     MANUAL THERAPY TECHNIQUES were applied for (0) minutes, including:    Manual Intervention Performed Today    Soft Tissue Mobilization []    Joint  Mobilizations []     []     []    Functional Dry Needling  []      Plan for Next Visit: Continue as needed       THERAPEUTIC EXERCISES to develop strength, endurance, ROM, flexibility, posture, and core stabilization for (39) minutes including:    Patient lays supine with blue wedge for comfort for all mat exercises.    Intervention Performed Today    nustep x 5 minutes    Glut sets x 3 minutes 3 seconds    Bridges  Attempted but unable due to increased pain with initiating movement   Posterior pelvic tilt  x 3 minutes 5 seconds hold   Hip adduction with ball x 3 minutes 5 seconds hold supine   Hip abduction with belt x 3 minutes 3 seconds hold supine   Abdominal brace   X 20 sitting green swiss ball in lap arms on top   Abdominal brace with shoulder flexion  2 x 10 bilateral upper extremity at the same time   Sit with good posture  10 x 10 seconds sitting on black mat with airex   Straight leg raise with abdominal brace  x 3 x 10 bilateral    Hip flexion  x 2 x 10 Sitting with good posture sitting on black mat with airex   Hip flexion with opposite shoulder flexion  2 x 10 good posture sitting on black mat with airex   Long arch quad  x 2 x 10 Sitting with good posture sitting on black mat with airex   Side steps  2 minutes    Payloff press x 3 x 10 standing red band bilateral     Scapula retraction  3 x 10 red band (added)          Plan for Next Visit:         Patient Education and Home Exercises       Home Exercises Provided and Patient Education Provided     Education provided: (during session) minutes  PURPOSE: Patient educated on the impairments noted above and the effects of physical therapy intervention to improve overall condition and QOL.   EXERCISE: Patient was educated on all the above exercise prior/during/after for proper posture, positioning, and execution for safe performance with home exercise program.   STRENGTH: Patient educated on the importance of improved core and extremity strength in order to  improve alignment of the spine and extremities with static positions and dynamic movement.   7/11/2023: added home exercise program to my chart and gave patient paper copy discussed how to progress within tolerance. Discussed to avoid going to gym at this time and instead to oziel in her effort on performing home exercise program on a regular schedule.     Written Home Exercises Provided: yes.  Exercises were reviewed and Ruth Ann was able to demonstrate them prior to the end of the session.  Ruth Ann demonstrated good  understanding of the education provided. See EMR under Patient Instructions for exercises provided during therapy sessions.    Assessment     Patient tolerated today's session mildly well. Patient presents to session late which restricted today's session. Patient symptoms increased today but able to tolerate exercises prescribed with rest taken     Ruth Ann is progressing well towards her goals.   Patient prognosis is Good.     Patient will continue to benefit from skilled outpatient physical therapy to address the deficits listed in the problem list box on initial evaluation, provide pt/family education and to maximize patient's level of independence in the home and community environment.     Patient's spiritual, cultural and educational needs considered and pt agreeable to plan of care and goals.     Anticipated Barriers for therapy: co-morbidities and sedentary lifestyle, BMI over 50       Short Term Goals:  4 weeks Status  Date Met   PAIN: Pt will report worst pain of 7/10 in order to progress toward max functional ability and improve quality of life. [x] Progressing  [] Met  [] Not Met     FUNCTION: Patient will demonstrate improved function as indicated by a score of greater than or equal to 38 out of 100 on FOTO. [x] Progressing  [] Met  [] Not Met     MOBILITY: Patient will improve AROM to 50% of stated goals, listed in objective measures above, in order to progress towards independence with  functional activities.  [x] Progressing  [] Met  [] Not Met     STRENGTH: Patient will improve strength to 50% of stated goals, listed in objective measures above, in order to progress towards independence with functional activities. [x] Progressing  [] Met  [] Not Met     POSTURE: Patient will correct postural deviations in sitting and standing, to decrease pain and promote long term stability.  [x] Progressing  [] Met  [] Not Met     GAIT: Patient will demonstrate improved gait mechanics including normalize gait in order to improve functional mobility, improve quality of life, and decrease risk of further injury or fall.  [x] Progressing  [] Met  [] Not Met     HEP: Patient will demonstrate independence with HEP in order to progress toward functional independence. [x] Progressing  [] Met  [] Not Met     Patient will be able to stand for 30 minutes with 4/10 pain.  [x] Progressing  [] Met  [] Not Met        Long Term Goals:  8 weeks Status Date Met   PAIN: Pt will report worst pain of 3/10 in order to progress toward max functional ability and improve quality of life [x] Progressing  [] Met  [] Not Met     FUNCTION: Patient will demonstrate improved function as indicated by a score of greater than or equal to 50 out of 100 on FOTO. [x] Progressing  [] Met  [] Not Met     MOBILITY: Patient will improve AROM to stated goals, listed in objective measures above, in order to return to maximal functional potential and improve quality of life.  [x] Progressing  [] Met  [] Not Met     STRENGTH: Patient will improve strength to stated goals, listed in objective measures above, in order to improve functional independence and quality of life.  [x] Progressing  [] Met  [] Not Met     GAIT: Patient will demonstrate normalized gait mechanics with minimal compensation in order to return to PLOF. [x] Progressing  [] Met  [] Not Met     Patient will return to normal ADL's, IADL's, community involvement, recreational activities, and  work-related activities with less than or equal to 3/10 pain and maximal function.  [x] Progressing  [] Met  [] Not Met     Patient will be able to stand for 1 hour with 3/10 pain.  [x] Progressing  [] Met  [] Not Met           Plan     Continue Plan of Care (POC) and progress per patient tolerance. See treatment section for details on planned progressions next session.      Milad Keene, PT

## 2023-07-28 NOTE — TELEPHONE ENCOUNTER
"Informed pt per Loyda,"Patient plus did check her urine, also GC, chlamydia and trich. STD negative and they prescribed her the first line medication for UTI. She should take it if she is symptomatic. Increase water intake. Her Vaginosis swab with me was negative as well.     If she is having bladder problems after completing antibiotics and increasing water, she should follow up with PCP for further urological evaluation."    Patient stated "ok".  "

## 2023-07-31 ENCOUNTER — TELEPHONE (OUTPATIENT)
Dept: PRIMARY CARE CLINIC | Facility: CLINIC | Age: 23
End: 2023-07-31
Payer: MEDICAID

## 2023-07-31 ENCOUNTER — CLINICAL SUPPORT (OUTPATIENT)
Dept: REHABILITATION | Facility: HOSPITAL | Age: 23
End: 2023-07-31
Payer: MEDICAID

## 2023-07-31 DIAGNOSIS — G89.29 CHRONIC BILATERAL LOW BACK PAIN WITH BILATERAL SCIATICA: Primary | ICD-10-CM

## 2023-07-31 DIAGNOSIS — Z74.09 DECREASED MOBILITY AND ENDURANCE: ICD-10-CM

## 2023-07-31 DIAGNOSIS — M62.5A2 MUSCLE WASTING AND ATROPHY, NOT ELSEWHERE CLASSIFIED, BACK, LUMBOSACRAL: ICD-10-CM

## 2023-07-31 DIAGNOSIS — M54.41 CHRONIC BILATERAL LOW BACK PAIN WITH BILATERAL SCIATICA: Primary | ICD-10-CM

## 2023-07-31 DIAGNOSIS — M54.42 CHRONIC BILATERAL LOW BACK PAIN WITH BILATERAL SCIATICA: Primary | ICD-10-CM

## 2023-07-31 DIAGNOSIS — M25.60 STIFFNESS OF JOINT: ICD-10-CM

## 2023-07-31 PROCEDURE — 97110 THERAPEUTIC EXERCISES: CPT | Mod: CQ
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
## 2023-07-31 NOTE — TELEPHONE ENCOUNTER
Returned the patient  call there was no answer.    ----- Message from Amy Rao sent at 7/31/2023 12:05 PM CDT -----  Contact: pt  Type:  Sooner Apoointment Request    Caller is requesting a sooner appointment.  Caller declined first available appointment listed below.  Caller will not accept being placed on the waitlist and is requesting a message be sent to doctor.  Name of Caller: pt  When is the first available appointment? unknown  Symptoms: est care/need referral  Would the patient rather a call back or a response via [facility]?  phone  Best Call Back Number: [phone]  Additional Information:

## 2023-08-01 ENCOUNTER — PATIENT MESSAGE (OUTPATIENT)
Dept: OBSTETRICS AND GYNECOLOGY | Facility: CLINIC | Age: 23
End: 2023-08-01
Payer: MEDICAID

## 2023-09-11 ENCOUNTER — DOCUMENTATION ONLY (OUTPATIENT)
Dept: REHABILITATION | Facility: HOSPITAL | Age: 23
End: 2023-09-11
Payer: MEDICAID

## 2023-09-11 DIAGNOSIS — M25.60 STIFFNESS OF JOINT: ICD-10-CM

## 2023-09-11 DIAGNOSIS — G89.29 CHRONIC BILATERAL LOW BACK PAIN WITH BILATERAL SCIATICA: Primary | ICD-10-CM

## 2023-09-11 DIAGNOSIS — M62.5A2 MUSCLE WASTING AND ATROPHY, NOT ELSEWHERE CLASSIFIED, BACK, LUMBOSACRAL: ICD-10-CM

## 2023-09-11 DIAGNOSIS — M54.42 CHRONIC BILATERAL LOW BACK PAIN WITH BILATERAL SCIATICA: Primary | ICD-10-CM

## 2023-09-11 DIAGNOSIS — M54.41 CHRONIC BILATERAL LOW BACK PAIN WITH BILATERAL SCIATICA: Primary | ICD-10-CM

## 2023-09-11 DIAGNOSIS — Z74.09 DECREASED MOBILITY AND ENDURANCE: ICD-10-CM

## 2023-09-11 PROBLEM — M54.50 LOW BACK PAIN: Status: RESOLVED | Noted: 2023-06-22 | Resolved: 2023-09-11

## 2023-09-11 NOTE — PROGRESS NOTES
[facility]  Physical Therapy Discharge Note    Name: Ruth Ann Oropeza  [facility] Number: [id number]    Therapy Diagnosis:   Encounter Diagnoses   Name Primary?    Chronic bilateral low back pain with bilateral sciatica Yes    Muscle wasting and atrophy, not elsewhere classified, back, lumbosacral     Decreased mobility and endurance     Stiffness of joint      Physician: Micheline Rosas MD    Physician Orders: PT Eval and Treat  Medical Diagnosis from Referral: Radiculopathy of thoracolumbar region  Evaluation Date: 6/22/2023      Date of Last visit: 07/31/2023  Total Visits Received: 6    ASSESSMENT      See daily note    Discharge reason: Patient has not attended therapy since 07/31/2023.    Discharge FOTO Score: see daily note    Goals: see daily note    PLAN   This patient is discharged from Physical Therapy      Milad Keene PT

## 2023-10-03 ENCOUNTER — PATIENT MESSAGE (OUTPATIENT)
Dept: OBSTETRICS AND GYNECOLOGY | Facility: CLINIC | Age: 23
End: 2023-10-03
Payer: MEDICAID

## 2023-10-27 ENCOUNTER — PATIENT MESSAGE (OUTPATIENT)
Dept: OBSTETRICS AND GYNECOLOGY | Facility: CLINIC | Age: 23
End: 2023-10-27
Payer: MEDICAID

## 2023-12-06 ENCOUNTER — OFFICE VISIT (OUTPATIENT)
Dept: OBSTETRICS AND GYNECOLOGY | Facility: CLINIC | Age: 23
End: 2023-12-06
Payer: MEDICAID

## 2023-12-06 VITALS
DIASTOLIC BLOOD PRESSURE: 80 MMHG | SYSTOLIC BLOOD PRESSURE: 126 MMHG | HEIGHT: 68 IN | WEIGHT: 293 LBS | BODY MASS INDEX: 44.41 KG/M2

## 2023-12-06 DIAGNOSIS — Z11.3 SCREEN FOR STD (SEXUALLY TRANSMITTED DISEASE): ICD-10-CM

## 2023-12-06 DIAGNOSIS — N94.6 DYSMENORRHEA: ICD-10-CM

## 2023-12-06 DIAGNOSIS — Z12.4 CERVICAL CANCER SCREENING: ICD-10-CM

## 2023-12-06 DIAGNOSIS — Z01.419 ENCOUNTER FOR ANNUAL ROUTINE GYNECOLOGICAL EXAMINATION: Primary | ICD-10-CM

## 2023-12-06 PROCEDURE — 99395 PR PREVENTIVE VISIT,EST,18-39: ICD-10-PCS | Mod: S$PBB,,,

## 2023-12-06 PROCEDURE — 99213 OFFICE O/P EST LOW 20 MIN: CPT | Mod: PBBFAC

## 2023-12-06 PROCEDURE — 3044F PR MOST RECENT HEMOGLOBIN A1C LEVEL <7.0%: ICD-10-PCS | Mod: CPTII,,,

## 2023-12-06 PROCEDURE — 99999 PR PBB SHADOW E&M-EST. PATIENT-LVL III: CPT | Mod: PBBFAC,,,

## 2023-12-06 PROCEDURE — 1160F RVW MEDS BY RX/DR IN RCRD: CPT | Mod: CPTII,,,

## 2023-12-06 PROCEDURE — 87491 CHLMYD TRACH DNA AMP PROBE: CPT

## 2023-12-06 PROCEDURE — 99999 PR PBB SHADOW E&M-EST. PATIENT-LVL III: ICD-10-PCS | Mod: PBBFAC,,,

## 2023-12-06 PROCEDURE — 1160F PR REVIEW ALL MEDS BY PRESCRIBER/CLIN PHARMACIST DOCUMENTED: ICD-10-PCS | Mod: CPTII,,,

## 2023-12-06 PROCEDURE — 3074F PR MOST RECENT SYSTOLIC BLOOD PRESSURE < 130 MM HG: ICD-10-PCS | Mod: CPTII,,,

## 2023-12-06 PROCEDURE — 88175 CYTOPATH C/V AUTO FLUID REDO: CPT

## 2023-12-06 PROCEDURE — 3074F SYST BP LT 130 MM HG: CPT | Mod: CPTII,,,

## 2023-12-06 PROCEDURE — 3044F HG A1C LEVEL LT 7.0%: CPT | Mod: CPTII,,,

## 2023-12-06 PROCEDURE — 3008F PR BODY MASS INDEX (BMI) DOCUMENTED: ICD-10-PCS | Mod: CPTII,,,

## 2023-12-06 PROCEDURE — 1159F MED LIST DOCD IN RCRD: CPT | Mod: CPTII,,,

## 2023-12-06 PROCEDURE — 99395 PREV VISIT EST AGE 18-39: CPT | Mod: S$PBB,,,

## 2023-12-06 PROCEDURE — 3008F BODY MASS INDEX DOCD: CPT | Mod: CPTII,,,

## 2023-12-06 PROCEDURE — 3079F DIAST BP 80-89 MM HG: CPT | Mod: CPTII,,,

## 2023-12-06 PROCEDURE — 1159F PR MEDICATION LIST DOCUMENTED IN MEDICAL RECORD: ICD-10-PCS | Mod: CPTII,,,

## 2023-12-06 PROCEDURE — 3079F PR MOST RECENT DIASTOLIC BLOOD PRESSURE 80-89 MM HG: ICD-10-PCS | Mod: CPTII,,,

## 2023-12-06 RX ORDER — SERTRALINE HYDROCHLORIDE 100 MG/1
150 TABLET, FILM COATED ORAL
COMMUNITY
Start: 2023-10-30

## 2023-12-06 RX ORDER — IBUPROFEN 800 MG/1
800 TABLET ORAL EVERY 8 HOURS PRN
Qty: 30 TABLET | Refills: 6 | Status: SHIPPED | OUTPATIENT
Start: 2023-12-06 | End: 2024-12-05
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
## 2023-12-07 LAB
C TRACH DNA SPEC QL NAA+PROBE: NOT DETECTED
N GONORRHOEA DNA SPEC QL NAA+PROBE: NOT DETECTED

## 2023-12-15 LAB
FINAL PATHOLOGIC DIAGNOSIS: NORMAL
Lab: NORMAL

## 2024-01-04 ENCOUNTER — NURSE TRIAGE (OUTPATIENT)
Dept: ADMINISTRATIVE | Facility: CLINIC | Age: 24
End: 2024-01-04
Payer: MEDICAID

## 2024-01-04 NOTE — TELEPHONE ENCOUNTER
"Pt states that she had a large BM today and had a large amount of bright red blood in the toilet. Pt states "I think my hemorrhoid popped". Advised to go to ED now per protocol. VU.  Encounter routed to provider.       Reason for Disposition   SEVERE rectal bleeding (large blood clots; constant or on and off bleeding)    Additional Information   Negative: Shock suspected (e.g., cold/pale/clammy skin, too weak to stand, low BP, rapid pulse)   Negative: Difficult to awaken or acting confused (e.g., disoriented, slurred speech)   Negative: Passed out (i.e., lost consciousness, collapsed and was not responding)   Negative: Sounds like a life-threatening emergency to the triager   Negative: [1] Vomiting AND [2] contains red blood or black ("coffee ground") material  (Exception: Few red streaks in vomit that only happened once.)    Protocols used: Rectal Bleeding-A-AH    "

## (undated) DEVICE — TAPE SURG MEDIPORE 6X72IN

## (undated) DEVICE — PAD ABD 8X10 STERILE

## (undated) DEVICE — TAPE SILK 3IN

## (undated) DEVICE — DRESSING AQUACEL AG 3.5X10IN